# Patient Record
Sex: MALE | Race: WHITE | NOT HISPANIC OR LATINO
[De-identification: names, ages, dates, MRNs, and addresses within clinical notes are randomized per-mention and may not be internally consistent; named-entity substitution may affect disease eponyms.]

---

## 2017-02-10 ENCOUNTER — RESULT REVIEW (OUTPATIENT)
Age: 62
End: 2017-02-10

## 2017-02-17 ENCOUNTER — OUTPATIENT (OUTPATIENT)
Dept: OUTPATIENT SERVICES | Facility: HOSPITAL | Age: 62
LOS: 1 days | Discharge: HOME | End: 2017-02-17

## 2017-02-17 DIAGNOSIS — C85.80 OTHER SPECIFIED TYPES OF NON-HODGKIN LYMPHOMA, UNSPECIFIED SITE: ICD-10-CM

## 2017-02-21 ENCOUNTER — RESULT REVIEW (OUTPATIENT)
Age: 62
End: 2017-02-21

## 2017-02-21 ENCOUNTER — APPOINTMENT (OUTPATIENT)
Dept: HEMATOLOGY ONCOLOGY | Facility: CLINIC | Age: 62
End: 2017-02-21

## 2017-02-22 ENCOUNTER — APPOINTMENT (OUTPATIENT)
Dept: HEMATOLOGY ONCOLOGY | Facility: CLINIC | Age: 62
End: 2017-02-22

## 2017-02-22 VITALS
BODY MASS INDEX: 25.62 KG/M2 | SYSTOLIC BLOOD PRESSURE: 128 MMHG | DIASTOLIC BLOOD PRESSURE: 84 MMHG | HEART RATE: 60 BPM | RESPIRATION RATE: 16 BRPM | WEIGHT: 178.55 LBS | OXYGEN SATURATION: 97 % | TEMPERATURE: 98.2 F

## 2017-02-22 LAB
EOSINOPHIL # BLD AUTO: 0 K/UL — SIGNIFICANT CHANGE UP (ref 0–0.5)
EOSINOPHIL NFR BLD AUTO: 4 % — SIGNIFICANT CHANGE UP (ref 0–6)
HCT VFR BLD CALC: 35.8 % — LOW (ref 39–50)
HGB BLD-MCNC: 12.2 G/DL — LOW (ref 13–17)
LYMPHOCYTES # BLD AUTO: 0.8 K/UL — LOW (ref 1–3.3)
LYMPHOCYTES # BLD AUTO: 21 % — SIGNIFICANT CHANGE UP (ref 13–44)
MCHC RBC-ENTMCNC: 33.1 PG — SIGNIFICANT CHANGE UP (ref 27–34)
MCHC RBC-ENTMCNC: 34.1 G/DL — SIGNIFICANT CHANGE UP (ref 32–36)
MCV RBC AUTO: 97.2 FL — SIGNIFICANT CHANGE UP (ref 80–100)
MONOCYTES # BLD AUTO: 0.4 K/UL — SIGNIFICANT CHANGE UP (ref 0–0.9)
MONOCYTES NFR BLD AUTO: 15 % — HIGH (ref 2–14)
NEUTROPHILS # BLD AUTO: 1.4 K/UL — LOW (ref 1.8–7.4)
NEUTROPHILS NFR BLD AUTO: 60 % — SIGNIFICANT CHANGE UP (ref 43–77)
PLAT MORPH BLD: NORMAL — SIGNIFICANT CHANGE UP
PLATELET # BLD AUTO: 128 K/UL — LOW (ref 150–400)
RBC # BLD: 3.68 M/UL — LOW (ref 4.2–5.8)
RBC # FLD: 14.1 % — SIGNIFICANT CHANGE UP (ref 10.3–14.5)
RBC BLD AUTO: SIGNIFICANT CHANGE UP
RETICS #: 80.2 K/UL — SIGNIFICANT CHANGE UP (ref 25–125)
RETICS/RBC NFR: 2.1 % — SIGNIFICANT CHANGE UP (ref 0.5–2.5)
WBC # BLD: 2.7 K/UL — LOW (ref 3.8–10.5)
WBC # FLD AUTO: 2.7 K/UL — LOW (ref 3.8–10.5)

## 2017-02-26 ENCOUNTER — RESULT REVIEW (OUTPATIENT)
Age: 62
End: 2017-02-26

## 2017-02-27 ENCOUNTER — OUTPATIENT (OUTPATIENT)
Dept: OUTPATIENT SERVICES | Facility: HOSPITAL | Age: 62
LOS: 1 days | End: 2017-02-27
Payer: COMMERCIAL

## 2017-02-27 ENCOUNTER — LABORATORY RESULT (OUTPATIENT)
Age: 62
End: 2017-02-27

## 2017-02-27 ENCOUNTER — APPOINTMENT (OUTPATIENT)
Dept: HEMATOLOGY ONCOLOGY | Facility: CLINIC | Age: 62
End: 2017-02-27

## 2017-02-27 DIAGNOSIS — C85.80 OTHER SPECIFIED TYPES OF NON-HODGKIN LYMPHOMA, UNSPECIFIED SITE: ICD-10-CM

## 2017-02-27 LAB
BASOPHILS # BLD AUTO: 0 K/UL — SIGNIFICANT CHANGE UP (ref 0–0.2)
BASOPHILS NFR BLD AUTO: 0.7 % — SIGNIFICANT CHANGE UP (ref 0–2)
EOSINOPHIL # BLD AUTO: 0 K/UL — SIGNIFICANT CHANGE UP (ref 0–0.5)
EOSINOPHIL NFR BLD AUTO: 1.3 % — SIGNIFICANT CHANGE UP (ref 0–6)
HCT VFR BLD CALC: 36.4 % — LOW (ref 39–50)
HGB BLD-MCNC: 12.2 G/DL — LOW (ref 13–17)
LYMPHOCYTES # BLD AUTO: 0.8 K/UL — LOW (ref 1–3.3)
LYMPHOCYTES # BLD AUTO: 25.5 % — SIGNIFICANT CHANGE UP (ref 13–44)
MCHC RBC-ENTMCNC: 32.9 PG — SIGNIFICANT CHANGE UP (ref 27–34)
MCHC RBC-ENTMCNC: 33.5 G/DL — SIGNIFICANT CHANGE UP (ref 32–36)
MCV RBC AUTO: 98.5 FL — SIGNIFICANT CHANGE UP (ref 80–100)
MONOCYTES # BLD AUTO: 0.4 K/UL — SIGNIFICANT CHANGE UP (ref 0–0.9)
MONOCYTES NFR BLD AUTO: 12.9 % — SIGNIFICANT CHANGE UP (ref 2–14)
NEUTROPHILS # BLD AUTO: 1.8 K/UL — SIGNIFICANT CHANGE UP (ref 1.8–7.4)
NEUTROPHILS NFR BLD AUTO: 59.5 % — SIGNIFICANT CHANGE UP (ref 43–77)
PLATELET # BLD AUTO: 131 K/UL — LOW (ref 150–400)
RBC # BLD: 3.7 M/UL — LOW (ref 4.2–5.8)
RBC # FLD: 14.3 % — SIGNIFICANT CHANGE UP (ref 10.3–14.5)
RETICS #: 89.4 K/UL — SIGNIFICANT CHANGE UP (ref 25–125)
RETICS/RBC NFR: 2.4 % — SIGNIFICANT CHANGE UP (ref 0.5–2.5)
WBC # BLD: 3 K/UL — LOW (ref 3.8–10.5)
WBC # FLD AUTO: 3 K/UL — LOW (ref 3.8–10.5)

## 2017-02-27 PROCEDURE — 88237 TISSUE CULTURE BONE MARROW: CPT

## 2017-03-10 LAB — CHROM ANALY OVERALL INTERP SPEC-IMP: SIGNIFICANT CHANGE UP

## 2017-03-14 ENCOUNTER — APPOINTMENT (OUTPATIENT)
Dept: HEMATOLOGY ONCOLOGY | Facility: CLINIC | Age: 62
End: 2017-03-14

## 2017-03-14 ENCOUNTER — RESULT REVIEW (OUTPATIENT)
Age: 62
End: 2017-03-14

## 2017-03-15 ENCOUNTER — OUTPATIENT (OUTPATIENT)
Dept: OUTPATIENT SERVICES | Facility: HOSPITAL | Age: 62
LOS: 1 days | Discharge: HOME | End: 2017-03-15

## 2017-03-15 ENCOUNTER — APPOINTMENT (OUTPATIENT)
Dept: HEMATOLOGY ONCOLOGY | Facility: CLINIC | Age: 62
End: 2017-03-15

## 2017-03-15 DIAGNOSIS — C85.80 OTHER SPECIFIED TYPES OF NON-HODGKIN LYMPHOMA, UNSPECIFIED SITE: ICD-10-CM

## 2017-03-15 LAB
BASOPHILS # BLD AUTO: 0 K/UL — SIGNIFICANT CHANGE UP (ref 0–0.2)
BASOPHILS NFR BLD AUTO: 0.5 % — SIGNIFICANT CHANGE UP (ref 0–2)
EOSINOPHIL # BLD AUTO: 0.1 K/UL — SIGNIFICANT CHANGE UP (ref 0–0.5)
EOSINOPHIL NFR BLD AUTO: 1.4 % — SIGNIFICANT CHANGE UP (ref 0–6)
HCT VFR BLD CALC: 35.1 % — LOW (ref 39–50)
HGB BLD-MCNC: 12.1 G/DL — LOW (ref 13–17)
LYMPHOCYTES # BLD AUTO: 0.9 K/UL — LOW (ref 1–3.3)
LYMPHOCYTES # BLD AUTO: 21.8 % — SIGNIFICANT CHANGE UP (ref 13–44)
MCHC RBC-ENTMCNC: 34.1 PG — HIGH (ref 27–34)
MCHC RBC-ENTMCNC: 34.4 G/DL — SIGNIFICANT CHANGE UP (ref 32–36)
MCV RBC AUTO: 99.1 FL — SIGNIFICANT CHANGE UP (ref 80–100)
MONOCYTES # BLD AUTO: 0.5 K/UL — SIGNIFICANT CHANGE UP (ref 0–0.9)
MONOCYTES NFR BLD AUTO: 11.1 % — SIGNIFICANT CHANGE UP (ref 2–14)
NEUTROPHILS # BLD AUTO: 2.8 K/UL — SIGNIFICANT CHANGE UP (ref 1.8–7.4)
NEUTROPHILS NFR BLD AUTO: 65.3 % — SIGNIFICANT CHANGE UP (ref 43–77)
PLATELET # BLD AUTO: 180 K/UL — SIGNIFICANT CHANGE UP (ref 150–400)
RBC # BLD: 3.54 M/UL — LOW (ref 4.2–5.8)
RBC # FLD: 13.2 % — SIGNIFICANT CHANGE UP (ref 10.3–14.5)
WBC # BLD: 4.2 K/UL — SIGNIFICANT CHANGE UP (ref 3.8–10.5)
WBC # FLD AUTO: 4.2 K/UL — SIGNIFICANT CHANGE UP (ref 3.8–10.5)

## 2017-03-20 ENCOUNTER — RESULT REVIEW (OUTPATIENT)
Age: 62
End: 2017-03-20

## 2017-03-20 LAB
ALBUMIN SERPL ELPH-MCNC: 4.2 G/DL
ALP BLD-CCNC: 37 U/L
ALT SERPL-CCNC: 16 U/L
ANION GAP SERPL CALC-SCNC: 11 MMOL/L
AST SERPL-CCNC: 17 U/L
BILIRUB SERPL-MCNC: 0.4 MG/DL
BUN SERPL-MCNC: 17 MG/DL
CALCIUM SERPL-MCNC: 9.2 MG/DL
CHLORIDE SERPL-SCNC: 103 MMOL/L
CO2 SERPL-SCNC: 27 MMOL/L
CREAT SERPL-MCNC: 1.2 MG/DL
GLUCOSE SERPL-MCNC: 99 MG/DL
LDH SERPL-CCNC: 218 U/L
POTASSIUM SERPL-SCNC: 4.6 MMOL/L
PROT SERPL-MCNC: 6.3 G/DL
SODIUM SERPL-SCNC: 141 MMOL/L

## 2017-04-28 ENCOUNTER — RESULT REVIEW (OUTPATIENT)
Age: 62
End: 2017-04-28

## 2017-04-28 LAB
ALBUMIN SERPL ELPH-MCNC: 4.4 G/DL
ALP BLD-CCNC: 37 U/L
ALT SERPL-CCNC: 23 U/L
ANION GAP SERPL CALC-SCNC: 12 MMOL/L
AST SERPL-CCNC: 21 U/L
BILIRUB SERPL-MCNC: 0.6 MG/DL
BUN SERPL-MCNC: 15 MG/DL
CALCIUM SERPL-MCNC: 9.5 MG/DL
CHLORIDE SERPL-SCNC: 102 MMOL/L
CO2 SERPL-SCNC: 26 MMOL/L
CREAT SERPL-MCNC: 1.25 MG/DL
GLUCOSE SERPL-MCNC: 101 MG/DL
LDH SERPL-CCNC: 217 U/L
POTASSIUM SERPL-SCNC: 5.4 MMOL/L
PROT SERPL-MCNC: 6.6 G/DL
SODIUM SERPL-SCNC: 140 MMOL/L

## 2017-05-05 ENCOUNTER — OUTPATIENT (OUTPATIENT)
Dept: OUTPATIENT SERVICES | Facility: HOSPITAL | Age: 62
LOS: 1 days | Discharge: HOME | End: 2017-05-05

## 2017-05-05 DIAGNOSIS — C85.80 OTHER SPECIFIED TYPES OF NON-HODGKIN LYMPHOMA, UNSPECIFIED SITE: ICD-10-CM

## 2017-05-09 ENCOUNTER — RESULT REVIEW (OUTPATIENT)
Age: 62
End: 2017-05-09

## 2017-05-09 ENCOUNTER — APPOINTMENT (OUTPATIENT)
Dept: HEMATOLOGY ONCOLOGY | Facility: CLINIC | Age: 62
End: 2017-05-09

## 2017-05-09 VITALS
RESPIRATION RATE: 16 BRPM | HEART RATE: 67 BPM | SYSTOLIC BLOOD PRESSURE: 138 MMHG | BODY MASS INDEX: 26.41 KG/M2 | OXYGEN SATURATION: 99 % | DIASTOLIC BLOOD PRESSURE: 89 MMHG | TEMPERATURE: 97.6 F | WEIGHT: 184.09 LBS

## 2017-05-09 LAB
BASOPHILS # BLD AUTO: 0 K/UL — SIGNIFICANT CHANGE UP (ref 0–0.2)
BASOPHILS NFR BLD AUTO: 1 % — SIGNIFICANT CHANGE UP (ref 0–2)
EOSINOPHIL # BLD AUTO: 0.1 K/UL — SIGNIFICANT CHANGE UP (ref 0–0.5)
EOSINOPHIL NFR BLD AUTO: 2.4 % — SIGNIFICANT CHANGE UP (ref 0–6)
HCT VFR BLD CALC: 38.7 % — LOW (ref 39–50)
HGB BLD-MCNC: 13.4 G/DL — SIGNIFICANT CHANGE UP (ref 13–17)
LYMPHOCYTES # BLD AUTO: 1 K/UL — SIGNIFICANT CHANGE UP (ref 1–3.3)
LYMPHOCYTES # BLD AUTO: 26.8 % — SIGNIFICANT CHANGE UP (ref 13–44)
MCHC RBC-ENTMCNC: 34.5 PG — HIGH (ref 27–34)
MCHC RBC-ENTMCNC: 34.6 G/DL — SIGNIFICANT CHANGE UP (ref 32–36)
MCV RBC AUTO: 99.6 FL — SIGNIFICANT CHANGE UP (ref 80–100)
MONOCYTES # BLD AUTO: 0.5 K/UL — SIGNIFICANT CHANGE UP (ref 0–0.9)
MONOCYTES NFR BLD AUTO: 13 % — SIGNIFICANT CHANGE UP (ref 2–14)
NEUTROPHILS # BLD AUTO: 2.2 K/UL — SIGNIFICANT CHANGE UP (ref 1.8–7.4)
NEUTROPHILS NFR BLD AUTO: 56.8 % — SIGNIFICANT CHANGE UP (ref 43–77)
PLATELET # BLD AUTO: 129 K/UL — LOW (ref 150–400)
RBC # BLD: 3.89 M/UL — LOW (ref 4.2–5.8)
RBC # FLD: 12 % — SIGNIFICANT CHANGE UP (ref 10.3–14.5)
WBC # BLD: 3.8 K/UL — SIGNIFICANT CHANGE UP (ref 3.8–10.5)
WBC # FLD AUTO: 3.8 K/UL — SIGNIFICANT CHANGE UP (ref 3.8–10.5)

## 2017-05-11 ENCOUNTER — TRANSCRIPTION ENCOUNTER (OUTPATIENT)
Age: 62
End: 2017-05-11

## 2017-06-28 ENCOUNTER — APPOINTMENT (OUTPATIENT)
Dept: OTOLARYNGOLOGY | Facility: CLINIC | Age: 62
End: 2017-06-28

## 2017-06-28 VITALS
DIASTOLIC BLOOD PRESSURE: 78 MMHG | WEIGHT: 178 LBS | HEART RATE: 72 BPM | SYSTOLIC BLOOD PRESSURE: 120 MMHG | HEIGHT: 70 IN | BODY MASS INDEX: 25.48 KG/M2

## 2017-06-28 RX ORDER — MULTIVIT,IRON,MINERALS/LUTEIN
TABLET ORAL
Refills: 0 | Status: DISCONTINUED | COMMUNITY
End: 2017-06-28

## 2017-07-05 ENCOUNTER — RESULT REVIEW (OUTPATIENT)
Age: 62
End: 2017-07-05

## 2017-07-05 LAB
ALBUMIN SERPL ELPH-MCNC: 4.3 G/DL
ALP BLD-CCNC: 35 U/L
ALT SERPL-CCNC: 19 U/L
ANION GAP SERPL CALC-SCNC: 11 MMOL/L
AST SERPL-CCNC: 21 U/L
BILIRUB SERPL-MCNC: 0.4 MG/DL
BUN SERPL-MCNC: 15 MG/DL
CALCIUM SERPL-MCNC: 9.5 MG/DL
CHLORIDE SERPL-SCNC: 103 MMOL/L
CO2 SERPL-SCNC: 28 MMOL/L
CREAT SERPL-MCNC: 1.04 MG/DL
GLUCOSE SERPL-MCNC: 87 MG/DL
LDH SERPL-CCNC: 210 U/L
POTASSIUM SERPL-SCNC: 5.1 MMOL/L
PROT SERPL-MCNC: 6.5 G/DL
SODIUM SERPL-SCNC: 142 MMOL/L

## 2017-08-16 ENCOUNTER — NON-APPOINTMENT (OUTPATIENT)
Age: 62
End: 2017-08-16

## 2017-08-16 ENCOUNTER — APPOINTMENT (OUTPATIENT)
Dept: INTERNAL MEDICINE | Facility: CLINIC | Age: 62
End: 2017-08-16
Payer: COMMERCIAL

## 2017-08-16 VITALS — SYSTOLIC BLOOD PRESSURE: 134 MMHG | RESPIRATION RATE: 12 BRPM | DIASTOLIC BLOOD PRESSURE: 90 MMHG | HEART RATE: 75 BPM

## 2017-08-16 VITALS — WEIGHT: 175 LBS | HEIGHT: 70 IN | BODY MASS INDEX: 25.05 KG/M2

## 2017-08-16 VITALS — DIASTOLIC BLOOD PRESSURE: 88 MMHG | SYSTOLIC BLOOD PRESSURE: 128 MMHG

## 2017-08-16 DIAGNOSIS — H93.12 TINNITUS, LEFT EAR: ICD-10-CM

## 2017-08-16 PROCEDURE — 93000 ELECTROCARDIOGRAM COMPLETE: CPT

## 2017-08-16 PROCEDURE — 99215 OFFICE O/P EST HI 40 MIN: CPT | Mod: 25

## 2017-08-16 RX ORDER — LEVOFLOXACIN 500 MG/1
500 TABLET, FILM COATED ORAL
Qty: 5 | Refills: 0 | Status: COMPLETED | COMMUNITY
Start: 2017-05-12

## 2017-08-16 RX ORDER — CEFUROXIME AXETIL 500 MG/1
500 TABLET ORAL
Qty: 10 | Refills: 0 | Status: COMPLETED | COMMUNITY
Start: 2017-05-12

## 2018-02-22 ENCOUNTER — APPOINTMENT (OUTPATIENT)
Dept: INTERNAL MEDICINE | Facility: CLINIC | Age: 63
End: 2018-02-22
Payer: COMMERCIAL

## 2018-02-22 VITALS — HEART RATE: 80 BPM | RESPIRATION RATE: 12 BRPM | SYSTOLIC BLOOD PRESSURE: 154 MMHG | DIASTOLIC BLOOD PRESSURE: 98 MMHG

## 2018-02-22 VITALS — DIASTOLIC BLOOD PRESSURE: 100 MMHG | SYSTOLIC BLOOD PRESSURE: 144 MMHG

## 2018-02-22 VITALS — BODY MASS INDEX: 26.77 KG/M2 | HEIGHT: 70 IN | WEIGHT: 187 LBS

## 2018-02-22 DIAGNOSIS — D61.818 OTHER PANCYTOPENIA: ICD-10-CM

## 2018-02-22 PROCEDURE — 99215 OFFICE O/P EST HI 40 MIN: CPT

## 2018-04-03 ENCOUNTER — RX RENEWAL (OUTPATIENT)
Age: 63
End: 2018-04-03

## 2018-04-03 ENCOUNTER — TRANSCRIPTION ENCOUNTER (OUTPATIENT)
Age: 63
End: 2018-04-03

## 2018-06-18 ENCOUNTER — RX RENEWAL (OUTPATIENT)
Age: 63
End: 2018-06-18

## 2018-06-20 ENCOUNTER — RX RENEWAL (OUTPATIENT)
Age: 63
End: 2018-06-20

## 2018-06-21 ENCOUNTER — OUTPATIENT (OUTPATIENT)
Dept: OUTPATIENT SERVICES | Facility: HOSPITAL | Age: 63
LOS: 1 days | Discharge: HOME | End: 2018-06-21

## 2018-06-21 DIAGNOSIS — C85.80 OTHER SPECIFIED TYPES OF NON-HODGKIN LYMPHOMA, UNSPECIFIED SITE: ICD-10-CM

## 2018-06-25 ENCOUNTER — APPOINTMENT (OUTPATIENT)
Dept: HEMATOLOGY ONCOLOGY | Facility: CLINIC | Age: 63
End: 2018-06-25
Payer: COMMERCIAL

## 2018-06-25 ENCOUNTER — RESULT REVIEW (OUTPATIENT)
Age: 63
End: 2018-06-25

## 2018-06-25 VITALS
OXYGEN SATURATION: 98 % | DIASTOLIC BLOOD PRESSURE: 90 MMHG | BODY MASS INDEX: 27.52 KG/M2 | SYSTOLIC BLOOD PRESSURE: 140 MMHG | HEART RATE: 82 BPM | RESPIRATION RATE: 16 BRPM | WEIGHT: 191.8 LBS | TEMPERATURE: 98.1 F

## 2018-06-25 LAB
BASOPHILS # BLD AUTO: 0 K/UL — SIGNIFICANT CHANGE UP (ref 0–0.2)
BASOPHILS NFR BLD AUTO: 0.7 % — SIGNIFICANT CHANGE UP (ref 0–2)
EOSINOPHIL # BLD AUTO: 0.2 K/UL — SIGNIFICANT CHANGE UP (ref 0–0.5)
EOSINOPHIL NFR BLD AUTO: 3.6 % — SIGNIFICANT CHANGE UP (ref 0–6)
HCT VFR BLD CALC: 42.3 % — SIGNIFICANT CHANGE UP (ref 39–50)
HGB BLD-MCNC: 14.6 G/DL — SIGNIFICANT CHANGE UP (ref 13–17)
LYMPHOCYTES # BLD AUTO: 1.4 K/UL — SIGNIFICANT CHANGE UP (ref 1–3.3)
LYMPHOCYTES # BLD AUTO: 23.2 % — SIGNIFICANT CHANGE UP (ref 13–44)
MCHC RBC-ENTMCNC: 32.1 PG — SIGNIFICANT CHANGE UP (ref 27–34)
MCHC RBC-ENTMCNC: 34.5 G/DL — SIGNIFICANT CHANGE UP (ref 32–36)
MCV RBC AUTO: 92.8 FL — SIGNIFICANT CHANGE UP (ref 80–100)
MONOCYTES # BLD AUTO: 0.6 K/UL — SIGNIFICANT CHANGE UP (ref 0–0.9)
MONOCYTES NFR BLD AUTO: 9.9 % — SIGNIFICANT CHANGE UP (ref 2–14)
NEUTROPHILS # BLD AUTO: 3.7 K/UL — SIGNIFICANT CHANGE UP (ref 1.8–7.4)
NEUTROPHILS NFR BLD AUTO: 62.5 % — SIGNIFICANT CHANGE UP (ref 43–77)
PLATELET # BLD AUTO: 166 K/UL — SIGNIFICANT CHANGE UP (ref 150–400)
RBC # BLD: 4.56 M/UL — SIGNIFICANT CHANGE UP (ref 4.2–5.8)
RBC # FLD: 12.9 % — SIGNIFICANT CHANGE UP (ref 10.3–14.5)
WBC # BLD: 5.9 K/UL — SIGNIFICANT CHANGE UP (ref 3.8–10.5)
WBC # FLD AUTO: 5.9 K/UL — SIGNIFICANT CHANGE UP (ref 3.8–10.5)

## 2018-06-25 PROCEDURE — 99214 OFFICE O/P EST MOD 30 MIN: CPT

## 2018-06-26 LAB
ALBUMIN SERPL ELPH-MCNC: 4 G/DL
ALP BLD-CCNC: 44 U/L
ALT SERPL-CCNC: 17 U/L
ANION GAP SERPL CALC-SCNC: 15 MMOL/L
AST SERPL-CCNC: 16 U/L
BILIRUB SERPL-MCNC: 0.4 MG/DL
BUN SERPL-MCNC: 21 MG/DL
CALCIUM SERPL-MCNC: 9.1 MG/DL
CHLORIDE SERPL-SCNC: 101 MMOL/L
CHOLEST SERPL-MCNC: 221 MG/DL
CHOLEST/HDLC SERPL: 4.4 RATIO
CO2 SERPL-SCNC: 26 MMOL/L
CREAT SERPL-MCNC: 1.13 MG/DL
DEPRECATED KAPPA LC FREE/LAMBDA SER: 0.62 RATIO
DEPRECATED KAPPA LC FREE/LAMBDA SER: 0.62 RATIO
GLUCOSE SERPL-MCNC: 94 MG/DL
HDLC SERPL-MCNC: 50 MG/DL
IGA SER QL IEP: 128 MG/DL
IGG SER QL IEP: 894 MG/DL
IGM SER QL IEP: 122 MG/DL
KAPPA LC CSF-MCNC: 2.58 MG/DL
KAPPA LC CSF-MCNC: 2.58 MG/DL
KAPPA LC SERPL-MCNC: 1.6 MG/DL
KAPPA LC SERPL-MCNC: 1.6 MG/DL
LDH SERPL-CCNC: 210 U/L
LDLC SERPL CALC-MCNC: 140 MG/DL
POTASSIUM SERPL-SCNC: 3.9 MMOL/L
PROT SERPL-MCNC: 6.6 G/DL
SODIUM SERPL-SCNC: 142 MMOL/L
TRIGL SERPL-MCNC: 153 MG/DL

## 2018-06-27 LAB — M PROTEIN SPEC IFE-MCNC: NORMAL

## 2018-07-01 ENCOUNTER — FORM ENCOUNTER (OUTPATIENT)
Age: 63
End: 2018-07-01

## 2018-07-02 ENCOUNTER — APPOINTMENT (OUTPATIENT)
Dept: CT IMAGING | Facility: IMAGING CENTER | Age: 63
End: 2018-07-02
Payer: COMMERCIAL

## 2018-07-02 ENCOUNTER — OUTPATIENT (OUTPATIENT)
Dept: OUTPATIENT SERVICES | Facility: HOSPITAL | Age: 63
LOS: 1 days | End: 2018-07-02
Payer: COMMERCIAL

## 2018-07-02 DIAGNOSIS — C82.80 OTHER TYPES OF FOLLICULAR LYMPHOMA, UNSPECIFIED SITE: ICD-10-CM

## 2018-07-02 PROCEDURE — 74177 CT ABD & PELVIS W/CONTRAST: CPT

## 2018-07-02 PROCEDURE — 71260 CT THORAX DX C+: CPT

## 2018-07-02 PROCEDURE — 71260 CT THORAX DX C+: CPT | Mod: 26

## 2018-07-02 PROCEDURE — 74177 CT ABD & PELVIS W/CONTRAST: CPT | Mod: 26

## 2018-11-09 ENCOUNTER — RESULT REVIEW (OUTPATIENT)
Age: 63
End: 2018-11-09

## 2019-03-15 ENCOUNTER — RX RENEWAL (OUTPATIENT)
Age: 64
End: 2019-03-15

## 2019-03-29 ENCOUNTER — APPOINTMENT (OUTPATIENT)
Dept: INTERNAL MEDICINE | Facility: CLINIC | Age: 64
End: 2019-03-29
Payer: MEDICARE

## 2019-03-29 VITALS — DIASTOLIC BLOOD PRESSURE: 80 MMHG | SYSTOLIC BLOOD PRESSURE: 132 MMHG

## 2019-03-29 VITALS — HEART RATE: 80 BPM | RESPIRATION RATE: 12 BRPM | DIASTOLIC BLOOD PRESSURE: 84 MMHG | SYSTOLIC BLOOD PRESSURE: 124 MMHG

## 2019-03-29 VITALS
BODY MASS INDEX: 26.63 KG/M2 | DIASTOLIC BLOOD PRESSURE: 96 MMHG | SYSTOLIC BLOOD PRESSURE: 128 MMHG | WEIGHT: 186 LBS | HEART RATE: 78 BPM | HEIGHT: 70 IN

## 2019-03-29 DIAGNOSIS — Z87.891 PERSONAL HISTORY OF NICOTINE DEPENDENCE: ICD-10-CM

## 2019-03-29 DIAGNOSIS — N40.0 BENIGN PROSTATIC HYPERPLASIA WITHOUT LOWER URINARY TRACT SYMPMS: ICD-10-CM

## 2019-03-29 PROCEDURE — 99214 OFFICE O/P EST MOD 30 MIN: CPT

## 2019-03-29 NOTE — ASSESSMENT
[FreeTextEntry1] : SVT hx.  COnt Metoprolol\par HCVD. Good control Cont Verapamil  80.  bid\par UC x 25 years.  Colonoscopy 11-18  \par Hemorrhoidal bleeding mild and well tolerated.  \par Gets "chelation therapy" for "heavy metals"  \par Follicular lymphoma 2010, s.p chemo and RT.  Dr. Andres Newsome\par ECHO 5-17 shows trace MR PI TI\par Carotids 5-17 ok\par Popliteal artery 5-17 mild plaque\par Vent hernia\par Tinnitus.  Recurred after paining house and leaning. Had previous MRI that did not disclose int auditory canal pathology.  ENT rec noise machine.\par BPH and hx of prostate Bx.  PSA Dr Ortega \par L knee pain and thumb pain.  To see Dr Dove\par Declines Prevnar today\par Declines labs today as they were done yesterday by the [place] doctor\par

## 2019-03-29 NOTE — HEALTH RISK ASSESSMENT
[No falls in past year] : Patient reported no falls in the past year [0] : 2) Feeling down, depressed, or hopeless: Not at all (0) [] : No [[id number]] : 0

## 2019-03-29 NOTE — REVIEW OF SYSTEMS
[Negative] : Heme/Lymph [FreeTextEntry2] : See HPI [FreeTextEntry8] : Prostate Bx neg and PSA better

## 2019-03-29 NOTE — PHYSICAL EXAM
[No Acute Distress] : no acute distress [Well Nourished] : well nourished [Well Developed] : well developed [Well-Appearing] : well-appearing [Normal Sclera/Conjunctiva] : normal sclera/conjunctiva [PERRL] : pupils equal round and reactive to light [EOMI] : extraocular movements intact [Normal Outer Ear/Nose] : the outer ears and nose were normal in appearance [Normal Oropharynx] : the oropharynx was normal [Normal TMs] : both tympanic membranes were normal [No JVD] : no jugular venous distention [No Lymphadenopathy] : no lymphadenopathy [Thyroid Normal, No Nodules] : the thyroid was normal and there were no nodules present [No Respiratory Distress] : no respiratory distress  [Clear to Auscultation] : lungs were clear to auscultation bilaterally [No Accessory Muscle Use] : no accessory muscle use [Normal Rate] : normal rate  [Regular Rhythm] : with a regular rhythm [Normal S1, S2] : normal S1 and S2 [No Murmur] : no murmur heard [No Carotid Bruits] : no carotid bruits [No Abdominal Bruit] : a ~M bruit was not heard ~T in the abdomen [No Varicosities] : no varicosities [Pedal Pulses Present] : the pedal pulses are present [No Extremity Clubbing/Cyanosis] : no extremity clubbing/cyanosis [No Palpable Aorta] : no palpable aorta [Normal Appearance] : normal in appearance [No Nipple Discharge] : no nipple discharge [No Axillary Lymphadenopathy] : no axillary lymphadenopathy [Soft] : abdomen soft [Non Tender] : non-tender [Non-distended] : non-distended [No Masses] : no abdominal mass palpated [No HSM] : no HSM [Normal Bowel Sounds] : normal bowel sounds [Normal Supraclavicular Nodes] : no supraclavicular lymphadenopathy [Normal Axillary Nodes] : no axillary lymphadenopathy [Normal Anterior Cervical Nodes] : no anterior cervical lymphadenopathy [No CVA Tenderness] : no CVA  tenderness [No Spinal Tenderness] : no spinal tenderness [No Joint Swelling] : no joint swelling [Grossly Normal Strength/Tone] : grossly normal strength/tone [Normal Gait] : normal gait [Coordination Grossly Intact] : coordination grossly intact [No Focal Deficits] : no focal deficits [Normal Affect] : the affect was normal [Alert and Oriented x3] : oriented to person, place, and time [de-identified] : Trace edema at socks

## 2019-03-29 NOTE — HISTORY OF PRESENT ILLNESS
[FreeTextEntry1] : C/o tinnitus since he strained neck painting .  Getting better.  Saw ENT.\par Had heme f/up for lymphoma\par Eval of HCVD\par c/o L knee pain during trip to [place] related to walking.  Has appt to see Dr Dove. \par C/o AM back pain that resolves\par c/o L thumb pain at times.  \par Had IV chelation yesterday and had labs\par Planning to see Urology and get PSA [de-identified] : Saw Heme Onc and following borderline low plates

## 2019-04-04 ENCOUNTER — APPOINTMENT (OUTPATIENT)
Dept: ORTHOPEDIC SURGERY | Facility: CLINIC | Age: 64
End: 2019-04-04
Payer: MEDICARE

## 2019-04-04 VITALS
BODY MASS INDEX: 26.48 KG/M2 | WEIGHT: 185 LBS | HEART RATE: 55 BPM | SYSTOLIC BLOOD PRESSURE: 166 MMHG | HEIGHT: 70 IN | DIASTOLIC BLOOD PRESSURE: 107 MMHG

## 2019-04-04 VITALS — HEIGHT: 70 IN | WEIGHT: 185 LBS | BODY MASS INDEX: 26.48 KG/M2

## 2019-04-04 DIAGNOSIS — Z85.72 PERSONAL HISTORY OF NON-HODGKIN LYMPHOMAS: ICD-10-CM

## 2019-04-04 DIAGNOSIS — Z78.9 OTHER SPECIFIED HEALTH STATUS: ICD-10-CM

## 2019-04-04 DIAGNOSIS — F19.90 OTHER PSYCHOACTIVE SUBSTANCE USE, UNSPECIFIED, UNCOMPLICATED: ICD-10-CM

## 2019-04-04 DIAGNOSIS — M18.12 UNILATERAL PRIMARY OSTEOARTHRITIS OF FIRST CARPOMETACARPAL JOINT, LEFT HAND: ICD-10-CM

## 2019-04-04 DIAGNOSIS — M17.12 UNILATERAL PRIMARY OSTEOARTHRITIS, LEFT KNEE: ICD-10-CM

## 2019-04-04 DIAGNOSIS — Z80.1 FAMILY HISTORY OF MALIGNANT NEOPLASM OF TRACHEA, BRONCHUS AND LUNG: ICD-10-CM

## 2019-04-04 PROCEDURE — 99204 OFFICE O/P NEW MOD 45 MIN: CPT

## 2019-04-04 PROCEDURE — 73130 X-RAY EXAM OF HAND: CPT | Mod: LT

## 2019-04-04 PROCEDURE — 73562 X-RAY EXAM OF KNEE 3: CPT | Mod: 50

## 2019-04-04 NOTE — PHYSICAL EXAM
[de-identified] : Constitutional: Well-nourished, well-developed, No acute distress\par Respiratory:  Good respiratory effort, no SOB\par Lymphatic: No regional lymphadenopathy, no lymphedema\par Psychiatric: Pleasant and normal affect, alert and oriented x3\par Skin: Clean dry and intact B/L UE/LE\par Musculoskeletal: normal except where as noted in regional exam\par \par B/L Hips: No asymmetry, malalignment, or swelling, Full ROM, 5/5 strength in flexion/ext, IR/ER, Abd/Add, Joints stable\par B/L Ankles: No asymmetry, malalignment, or swelling, Full ROM, 5/5 strength in DF/PF/Inv/Ev, Joints stable\par \par BIOMECHANICAL EXAM: no marked leg length discrepancy, moderate hip abductor weakness b/l, no marked pes planus or foot pronation, tight hams and ITB b/l.  Normal gait and station\par \par Left Knee:\par APPEARANCE: no marked deformities, no swelling or malalignment\par POSITIVE TENDERNESS:  + crepitus of the anterior knee, and tenderness of patellar retinaculum\par NONTENDER: jt lines b/l, patellar & quadriceps tendons, MCL/LCL, ITB at the lateral femoral condyle & Gerdy's tubercle, pes bursa. \par ROM: full & painless, although some discomfort in deep knee flexion\par RESISTIVE TESTING: + discomfort with knee ext from deep knee flexion (stretched position), painless knee flexion. \par SPECIAL TESTS: stable v/v stress. painless grind. neg Lachman's. neg ant/post drawer. neg Heather's. neg Thessaly test. neg Tomasz's & Malacrae's\par NEURO: Normal sensation of LE, DTRs 2+/4 patella and achilles\par PULSES: 2+ DP/PT pulses\par \par Right Knee:\par APPEARANCE: no marked deformities, no swelling or malalignment\par POSITIVE TENDERNESS:  + crepitus of the anterior knee, and tenderness of patellar retinaculum\par NONTENDER: jt lines b/l, patellar & quadriceps tendons, MCL/LCL, ITB at the lateral femoral condyle & Gerdy's tubercle, pes bursa. \par ROM: full & painless, although some discomfort in deep knee flexion\par RESISTIVE TESTING: + discomfort with knee ext from deep knee flexion (stretched position), painless knee flexion. \par SPECIAL TESTS: stable v/v stress. painless grind. neg Lachman's. neg ant/post drawer. neg Heather's. neg Thessaly test. neg Tomasz's & Malacrae's\par NEURO: Normal sensation of LE, DTRs 2+/4 patella and achilles\par PULSES: 2+ DP/PT pulses\par \par Right Hand:\par APPEARANCE: no marked deformities, no swelling or malalignment\par POSITIVE TENDERNESS: none\par NONTENDER: osseous and ligamentous structures. \par ROM: full & painless in CMC, MCP, and IP joints. \par RESISTIVE TESTING: painless resisted testing. \par SPECIAL TESTS: normal sensation of 1st through 5th digits, normal flex/ext, abd/add, and opposition of thumb, no triggering of fingers\par Vasc: 2+ radial pulse, normal capillary refill\par Neuro: AIN, PIN, Ulnar nerve intact to motor\par Sensation: Intact to light touch throughout\par B/L Shoulders:  No asymmetry, malalignment, or swelling, Full ROM, 5/5 strength in flexion/ext, Abd/Add, IR/ER, Joints stable\par B/L Elbows:  No asymmetry, malalignment, or swelling, Full ROM, 5/5 strength in flex/ext, pronation/supination, Joints stable\par B/L wrists: No asymmetry, malalignment, or swelling, Full ROM, 5/5 strength in wrist flexion/ext, and radial/ulnar deviation, Joints stable\par \par Left Hand:\par APPEARANCE: No swelling, no marked deformities or malalignment of the fingers\par POSITIVE TENDERNESS: + 1st CMC joint\par NONTENDER: all other osseous and ligamentous structures. \par ROM: + Crepitus and mild limitation of the first CMC joint, otherwise full & painless in CMC, MCP, and IP joints. \par RESISTIVE TESTING: painless resisted testing. \par SPECIAL TESTS: normal sensation of 1st through 5th digits, normal flex/ext, abd/add, and opposition of thumb, no triggering of fingers\par Vasc: 2+ radial pulse, normal capillary refill\par Neuro: AIN, PIN, Ulnar nerve intact to motor\par Sensation: Intact to light touch throughout\par  [de-identified] : The following radiographs were ordered and read by me during this patient's visit. I reviewed each radiograph in detail with the patient and discussed the findings as highlighted below. \par \par 3 views of the bilateral knees were obtained today that show degenerative changes and evidence of mild tricompartmental osteoarthritis.  There is a large calcific deposit seen in the proximal attachment of the MCL on the left knee. No fracture, or dislocation seen at this time. There is no malalignment. No other obvious osseous abnormality. Otherwise unremarkable.\par \par 3 views of the left hand were obtained today that show degenerative changes and evidence of moderate to severe osteoarthritis in the first CMC joint with partial subluxation of the base of the metacarpal.  No fracture, or dislocation seen at this time. There is no malalignment. No other obvious osseous abnormality. Otherwise unremarkable.

## 2019-04-04 NOTE — DISCUSSION/SUMMARY
[de-identified] : Discussed findings of today's exam and possible causes of patient's pain.  Educated patient on their most probable diagnosis of mild bilateral knee osteoarthritis, left symptomatic at this time; and chronic left thumb pain secondary to moderate to severe osteoarthritis of the first CMC joint with mild subluxation.  Reviewed possible courses of treatment, and we collaboratively decided best course of treatment at this time will include conservative management.  Patient started on a course of oral NSAIDs, prescription given for Mobic. Patient advised that oral instead would be beneficial for both knees as well his left thumb. We discussed the role of cortisone injection into left thumb is also to bilateral knees, patient like to defer at this time. We also discussed utilization of hyaluronic acid in the bilateral knees, patient wishes to defer at this time. Patient advised he can  over the counter thumb spica splint to be worn during the day for support of his left thumb pain.  Follow up as needed.  Patient appreciates and agrees with current plan.\par \par This note was generated using dragon medical dictation software.  A reasonable effort has been made for proofreading its contents, but typos may still remain.  If there are any questions or points of clarification needed please notify my office.

## 2019-04-04 NOTE — HISTORY OF PRESENT ILLNESS
[de-identified] : Patient is here for left knee pain that began about a month ago when he was on a trip where he was doing excessive amounts of walking. He continued to walk through the pain and once he returned and got back to a normal routine he states that the pain has begun to decrease. He has used a heating pad for this pain. Denies N/T/R/Prior injury. \par He is also complaining of left thumb pain around the CMC joint. He states that it is intermittent. He has done nothing to treat this pain. He states that he may have dislocated this thumb in the past but cannot recall for sure which side it was. Denies N/T/R.

## 2019-04-27 ENCOUNTER — APPOINTMENT (OUTPATIENT)
Dept: MRI IMAGING | Facility: IMAGING CENTER | Age: 64
End: 2019-04-27

## 2019-05-29 ENCOUNTER — OUTPATIENT (OUTPATIENT)
Dept: OUTPATIENT SERVICES | Facility: HOSPITAL | Age: 64
LOS: 1 days | Discharge: HOME | End: 2019-05-29

## 2019-05-29 DIAGNOSIS — C85.80 OTHER SPECIFIED TYPES OF NON-HODGKIN LYMPHOMA, UNSPECIFIED SITE: ICD-10-CM

## 2019-06-03 ENCOUNTER — RESULT REVIEW (OUTPATIENT)
Age: 64
End: 2019-06-03

## 2019-06-03 ENCOUNTER — APPOINTMENT (OUTPATIENT)
Dept: HEMATOLOGY ONCOLOGY | Facility: CLINIC | Age: 64
End: 2019-06-03
Payer: MEDICARE

## 2019-06-03 VITALS
OXYGEN SATURATION: 99 % | SYSTOLIC BLOOD PRESSURE: 139 MMHG | RESPIRATION RATE: 16 BRPM | TEMPERATURE: 98.3 F | DIASTOLIC BLOOD PRESSURE: 93 MMHG | BODY MASS INDEX: 27.68 KG/M2 | HEART RATE: 58 BPM | WEIGHT: 192.9 LBS

## 2019-06-03 DIAGNOSIS — C82.80 OTHER TYPES OF FOLLICULAR LYMPHOMA, UNSPECIFIED SITE: ICD-10-CM

## 2019-06-03 LAB
BASOPHILS # BLD AUTO: 0 K/UL — SIGNIFICANT CHANGE UP (ref 0–0.2)
BASOPHILS NFR BLD AUTO: 0.6 % — SIGNIFICANT CHANGE UP (ref 0–2)
EOSINOPHIL # BLD AUTO: 0.1 K/UL — SIGNIFICANT CHANGE UP (ref 0–0.5)
EOSINOPHIL NFR BLD AUTO: 2.3 % — SIGNIFICANT CHANGE UP (ref 0–6)
HCT VFR BLD CALC: 40.6 % — SIGNIFICANT CHANGE UP (ref 39–50)
HGB BLD-MCNC: 14.2 G/DL — SIGNIFICANT CHANGE UP (ref 13–17)
LYMPHOCYTES # BLD AUTO: 1.2 K/UL — SIGNIFICANT CHANGE UP (ref 1–3.3)
LYMPHOCYTES # BLD AUTO: 22.7 % — SIGNIFICANT CHANGE UP (ref 13–44)
MCHC RBC-ENTMCNC: 32.2 PG — SIGNIFICANT CHANGE UP (ref 27–34)
MCHC RBC-ENTMCNC: 35 G/DL — SIGNIFICANT CHANGE UP (ref 32–36)
MCV RBC AUTO: 92 FL — SIGNIFICANT CHANGE UP (ref 80–100)
MONOCYTES # BLD AUTO: 0.6 K/UL — SIGNIFICANT CHANGE UP (ref 0–0.9)
MONOCYTES NFR BLD AUTO: 10.5 % — SIGNIFICANT CHANGE UP (ref 2–14)
NEUTROPHILS # BLD AUTO: 3.4 K/UL — SIGNIFICANT CHANGE UP (ref 1.8–7.4)
NEUTROPHILS NFR BLD AUTO: 63.9 % — SIGNIFICANT CHANGE UP (ref 43–77)
PLATELET # BLD AUTO: 175 K/UL — SIGNIFICANT CHANGE UP (ref 150–400)
RBC # BLD: 4.41 M/UL — SIGNIFICANT CHANGE UP (ref 4.2–5.8)
RBC # FLD: 11.7 % — SIGNIFICANT CHANGE UP (ref 10.3–14.5)
WBC # BLD: 5.4 K/UL — SIGNIFICANT CHANGE UP (ref 3.8–10.5)
WBC # FLD AUTO: 5.4 K/UL — SIGNIFICANT CHANGE UP (ref 3.8–10.5)

## 2019-06-03 PROCEDURE — 99213 OFFICE O/P EST LOW 20 MIN: CPT

## 2019-06-03 NOTE — ASSESSMENT
[FreeTextEntry1] : 62yo M w/ h/o stage I A. grade 3 follicular lymphoma 2010 treated with 3 cycles of R. CHOP followed by radiation therapy. He has remained in remission ever since. Last imaging done 7/2018. \par He remains well, f/u w/ PMD\par RTC 1 year

## 2019-06-03 NOTE — HISTORY OF PRESENT ILLNESS
[de-identified] : Patient diagnosed with stage I A. grade 3 follicular lymphoma 2010 treated with 3 cycles of R. CHOP followed by radiation therapy he has remained in remission ever since.  [de-identified] : Doing well, no complaints. \par \par CT C/A/P 7/2/18: No evidence of recurrent disease.

## 2019-06-03 NOTE — REASON FOR VISIT
[Follow-Up Visit] : a follow-up visit for [FreeTextEntry2] : Stage 1 Follicular grade 3 lymphoma 2010 treated with RCHOP times 3 followed by RT

## 2019-06-04 LAB
ALBUMIN SERPL ELPH-MCNC: 4.3 G/DL
ALP BLD-CCNC: 39 U/L
ALT SERPL-CCNC: 16 U/L
ANION GAP SERPL CALC-SCNC: 11 MMOL/L
AST SERPL-CCNC: 15 U/L
BILIRUB SERPL-MCNC: 0.4 MG/DL
BUN SERPL-MCNC: 16 MG/DL
CALCIUM SERPL-MCNC: 9.1 MG/DL
CHLORIDE SERPL-SCNC: 105 MMOL/L
CO2 SERPL-SCNC: 26 MMOL/L
CREAT SERPL-MCNC: 1.18 MG/DL
GLUCOSE SERPL-MCNC: 80 MG/DL
LDH SERPL-CCNC: 190 U/L
POTASSIUM SERPL-SCNC: 4.2 MMOL/L
PROT SERPL-MCNC: 6.3 G/DL
SODIUM SERPL-SCNC: 142 MMOL/L

## 2019-10-25 ENCOUNTER — APPOINTMENT (OUTPATIENT)
Dept: CARDIOLOGY | Facility: CLINIC | Age: 64
End: 2019-10-25
Payer: MEDICARE

## 2019-10-25 ENCOUNTER — NON-APPOINTMENT (OUTPATIENT)
Age: 64
End: 2019-10-25

## 2019-10-25 VITALS
HEIGHT: 70 IN | WEIGHT: 190 LBS | DIASTOLIC BLOOD PRESSURE: 88 MMHG | OXYGEN SATURATION: 96 % | HEART RATE: 56 BPM | SYSTOLIC BLOOD PRESSURE: 134 MMHG | BODY MASS INDEX: 27.2 KG/M2

## 2019-10-25 VITALS — SYSTOLIC BLOOD PRESSURE: 110 MMHG | DIASTOLIC BLOOD PRESSURE: 70 MMHG

## 2019-10-25 PROCEDURE — 99204 OFFICE O/P NEW MOD 45 MIN: CPT

## 2019-10-25 PROCEDURE — 93000 ELECTROCARDIOGRAM COMPLETE: CPT

## 2019-10-25 NOTE — HISTORY OF PRESENT ILLNESS
[FreeTextEntry1] : 64 year old man with history of lymphoma, PSVT, HTN, BPH presents for an initial cardiac evaluation. \par \par He had PSVT (palpitations) that started in 2005. He had it rarely until 2016. It has now resolved with Verapmil and lopressor. Weight loss has also helped. \par He ahs noted that he will feel flutter now for seconds that happen every few months. \par \par He   denies any chest pain, PND, orthopnea, lower extremity edema, near syncope, syncope, strokelike symptoms. \par He walks for exercise. He walks about 3.5 miles a day, and he does yoga. He denies any anginal symptoms. He denies any blurry vision, headaches. \par

## 2019-10-25 NOTE — DISCUSSION/SUMMARY
[FreeTextEntry1] : 64 year man with a history as listed presents for an initial cardiac evaluation. \par Neymar is doing well overall. He denies any anginal symptoms. Clinically he is euvolemic on exam. His EKG did not reveal any significant ischemic changes. Though he has a left axis deviation and early R wave transition. \par He will get a 2d echo to assess for any  new structural heart disease, changes in valvular and ventricular function. He will undergo a treadmill exercise stress test to define exercise tolerance, rule out exertional hypertensive responses, assess for exercise induced arrhythmias and rule out ischemia from obstructive CAD. \par Based on his lipid profile he should get statin therapy. He will fax me his latest labs. He will also get a carotid doppler to rule out atherosclerosis. \par Exercise and diet counseling was performed in order to reduce her future cardiovascular risk. \par He will followup with me in 1 year or sooner if necessary.

## 2019-10-25 NOTE — PHYSICAL EXAM
[Well Groomed] : well groomed [General Appearance - In No Acute Distress] : no acute distress [Normal Conjunctiva] : the conjunctiva exhibited no abnormalities [Eyelids - No Xanthelasma] : the eyelids demonstrated no xanthelasmas [Normal Oral Mucosa] : normal oral mucosa [No Oral Pallor] : no oral pallor [No Oral Cyanosis] : no oral cyanosis [Normal Jugular Venous A Waves Present] : normal jugular venous A waves present [Normal Jugular Venous V Waves Present] : normal jugular venous V waves present [No Jugular Venous Coffey A Waves] : no jugular venous coffey A waves [Normal Rate] : normal [Rhythm Regular] : regular [Normal S1] : normal S1 [Normal S2] : normal S2 [No Gallop] : no gallop heard [No Murmur] : no murmurs heard [2+] : left 2+ [Left Carotid Bruit] : no bruit heard over the left carotid [Right Carotid Bruit] : no bruit heard over the right carotid [No Pitting Edema] : no pitting edema present [Bruit] : no bruit heard [Exaggerated Use Of Accessory Muscles For Inspiration] : no accessory muscle use [Respiration, Rhythm And Depth] : normal respiratory rhythm and effort [Auscultation Breath Sounds / Voice Sounds] : lungs were clear to auscultation bilaterally [Abdomen Soft] : soft [Abdomen Tenderness] : non-tender [Abdomen Mass (___ Cm)] : no abdominal mass palpated [Abnormal Walk] : normal gait [Gait - Sufficient For Exercise Testing] : the gait was sufficient for exercise testing [Skin Color & Pigmentation] : normal skin color and pigmentation [No Venous Stasis] : no venous stasis [] : no rash [Skin Lesions] : no skin lesions [No Skin Ulcers] : no skin ulcer [No Xanthoma] : no  xanthoma was observed [Oriented To Time, Place, And Person] : oriented to person, place, and time [Affect] : the affect was normal [Mood] : the mood was normal [No Anxiety] : not feeling anxious

## 2019-10-31 ENCOUNTER — APPOINTMENT (OUTPATIENT)
Dept: CARDIOLOGY | Facility: CLINIC | Age: 64
End: 2019-10-31
Payer: MEDICARE

## 2019-10-31 PROCEDURE — 93306 TTE W/DOPPLER COMPLETE: CPT

## 2019-11-07 ENCOUNTER — APPOINTMENT (OUTPATIENT)
Dept: CARDIOLOGY | Facility: CLINIC | Age: 64
End: 2019-11-07
Payer: MEDICARE

## 2019-11-07 PROCEDURE — 93880 EXTRACRANIAL BILAT STUDY: CPT

## 2019-11-26 ENCOUNTER — APPOINTMENT (OUTPATIENT)
Dept: OTOLARYNGOLOGY | Facility: CLINIC | Age: 64
End: 2019-11-26
Payer: MEDICARE

## 2019-11-26 VITALS
DIASTOLIC BLOOD PRESSURE: 83 MMHG | SYSTOLIC BLOOD PRESSURE: 124 MMHG | WEIGHT: 185 LBS | HEART RATE: 69 BPM | HEIGHT: 70 IN | BODY MASS INDEX: 26.48 KG/M2

## 2019-11-26 DIAGNOSIS — R09.89 OTHER SPECIFIED SYMPTOMS AND SIGNS INVOLVING THE CIRCULATORY AND RESPIRATORY SYSTEMS: ICD-10-CM

## 2019-11-26 PROCEDURE — 31575 DIAGNOSTIC LARYNGOSCOPY: CPT

## 2019-11-26 PROCEDURE — 99214 OFFICE O/P EST MOD 30 MIN: CPT | Mod: 25

## 2019-11-26 RX ORDER — MELOXICAM 7.5 MG/1
7.5 TABLET ORAL
Qty: 30 | Refills: 0 | Status: DISCONTINUED | COMMUNITY
Start: 2019-04-04 | End: 2019-11-26

## 2019-11-26 NOTE — PROCEDURE
[de-identified] : Fiberoptic Laryngoscopy (39421)\par \par Procedure performed: Fiberoptic Laryngeal Endoscopy - Diagnostic\par Pre-op/post op indication: Dysphagia\par Patient was unable to cooperate with mirror. After informed verbal consent is obtained, the fiberoptic nasal endoscope # []  is passed via the [ right ][ left ] nasal cavity. \par Findings: base of tongue and vallecula clear, hypopharynx normal without pooled secretions, crisp epiglottis, no evidence of laryngomalacia, bilateral vocal fold mobility full and symmetric. There was  significant arytenoids edema and  erythema seen , without  mass or lesions..\par

## 2019-11-26 NOTE — REASON FOR VISIT
[Initial Evaluation] : an initial evaluation for [Other: _____] : [unfilled] [FreeTextEntry2] : Long time patient of Dr. Howe, initial visit today for globus sensation for the past few months, history of lymphoma.

## 2019-11-26 NOTE — HISTORY OF PRESENT ILLNESS
[de-identified] : 64 year old male long time patient of Dr. Howe, initial visit today for globus sensation for the past few months, history of lymphoma.  Patient states constantly feels as if something is stuck in throat.  Reports recent sore throat due to a cold, but otherwise denies throat pain.  Denies dysphagia, odynophagia, bleeding, hemoptysis, dyspnea, fevers and throat infections in the past year.  Tolerating eating and drinking with no coughing, choking or aspiration. Seen previously for tinnitus which resolved.

## 2019-12-03 ENCOUNTER — TRANSCRIPTION ENCOUNTER (OUTPATIENT)
Age: 64
End: 2019-12-03

## 2019-12-03 ENCOUNTER — APPOINTMENT (OUTPATIENT)
Dept: CARDIOLOGY | Facility: CLINIC | Age: 64
End: 2019-12-03
Payer: MEDICARE

## 2019-12-03 PROCEDURE — 93015 CV STRESS TEST SUPVJ I&R: CPT

## 2019-12-06 ENCOUNTER — TRANSCRIPTION ENCOUNTER (OUTPATIENT)
Age: 64
End: 2019-12-06

## 2020-02-25 ENCOUNTER — APPOINTMENT (OUTPATIENT)
Dept: OTOLARYNGOLOGY | Facility: CLINIC | Age: 65
End: 2020-02-25

## 2020-03-09 ENCOUNTER — RX RENEWAL (OUTPATIENT)
Age: 65
End: 2020-03-09

## 2020-05-13 ENCOUNTER — RX RENEWAL (OUTPATIENT)
Age: 65
End: 2020-05-13

## 2021-03-24 ENCOUNTER — APPOINTMENT (OUTPATIENT)
Dept: CARDIOLOGY | Facility: CLINIC | Age: 66
End: 2021-03-24
Payer: MEDICARE

## 2021-03-24 ENCOUNTER — NON-APPOINTMENT (OUTPATIENT)
Age: 66
End: 2021-03-24

## 2021-03-24 VITALS
BODY MASS INDEX: 27.49 KG/M2 | HEART RATE: 66 BPM | SYSTOLIC BLOOD PRESSURE: 153 MMHG | HEIGHT: 70 IN | OXYGEN SATURATION: 97 % | WEIGHT: 192 LBS | DIASTOLIC BLOOD PRESSURE: 98 MMHG

## 2021-03-24 PROCEDURE — 93000 ELECTROCARDIOGRAM COMPLETE: CPT

## 2021-03-24 PROCEDURE — 99214 OFFICE O/P EST MOD 30 MIN: CPT

## 2021-03-24 NOTE — DISCUSSION/SUMMARY
[FreeTextEntry1] : 64 year man with a history as listed presents for an initial cardiac evaluation. \par Neymar's blood pressure was reactive to dental pain. Now that his ental issues are resolved his bp has normalized. \par His chest pain was nonanginal. Clinically he is euvolemic on exam. His EKG did not reveal any significant ischemic changes. \par He will get a 2d echo to assess for any  new structural heart disease, changes in valvular and ventricular function. He will undergo a treadmill exercise stress test to define exercise tolerance, rule out exertional hypertensive responses, assess for exercise induced arrhythmias and rule out ischemia from obstructive CAD. \par he will continue with metoprolol and verapamil for his palpitations which has controlled his SVT and BP. \par Based on his former lipid profile he should get statin therapy. He will fax me his latest labs.  \par Exercise and diet counseling was performed in order to reduce her future cardiovascular risk. \par He will followup with me in 1 year or sooner if necessary.

## 2021-03-24 NOTE — PHYSICAL EXAM
[Well Groomed] : well groomed [General Appearance - In No Acute Distress] : no acute distress [Normal Conjunctiva] : the conjunctiva exhibited no abnormalities [Eyelids - No Xanthelasma] : the eyelids demonstrated no xanthelasmas [Normal Oral Mucosa] : normal oral mucosa [No Oral Pallor] : no oral pallor [No Oral Cyanosis] : no oral cyanosis [Normal Jugular Venous A Waves Present] : normal jugular venous A waves present [Normal Jugular Venous V Waves Present] : normal jugular venous V waves present [No Jugular Venous Coffey A Waves] : no jugular venous coffey A waves [Respiration, Rhythm And Depth] : normal respiratory rhythm and effort [Exaggerated Use Of Accessory Muscles For Inspiration] : no accessory muscle use [Auscultation Breath Sounds / Voice Sounds] : lungs were clear to auscultation bilaterally [Abdomen Soft] : soft [Abdomen Tenderness] : non-tender [Abdomen Mass (___ Cm)] : no abdominal mass palpated [Abnormal Walk] : normal gait [Gait - Sufficient For Exercise Testing] : the gait was sufficient for exercise testing [Skin Color & Pigmentation] : normal skin color and pigmentation [] : no rash [No Venous Stasis] : no venous stasis [Skin Lesions] : no skin lesions [No Skin Ulcers] : no skin ulcer [No Xanthoma] : no  xanthoma was observed [Oriented To Time, Place, And Person] : oriented to person, place, and time [Affect] : the affect was normal [Mood] : the mood was normal [No Anxiety] : not feeling anxious [Normal Rate] : normal [Rhythm Regular] : regular [Normal S1] : normal S1 [Normal S2] : normal S2 [No Gallop] : no gallop heard [No Murmur] : no murmurs heard [2+] : left 2+ [Right Carotid Bruit] : no bruit heard over the right carotid [Left Carotid Bruit] : no bruit heard over the left carotid [Bruit] : no bruit heard [No Pitting Edema] : no pitting edema present

## 2021-03-24 NOTE — HISTORY OF PRESENT ILLNESS
[FreeTextEntry1] : 65 year old man with history of lymphoma, PSVT, HTN, BPH presents for a followup cardiac evaluation. \par He had PSVT (palpitations) that started in 2005. He had it rarely until 2016. It has now resolved with Verapmil and lopressor.    \par \par He was last here in 2019. \par Recently was noted to have increased BP recently at his doctor visits (150s/90s) during his recent dental infection. He noted that he had mild chest tightness when his bp was elevated. He noted that since he stopped the clindamycin for a dental infection his bp improved. \par He   denies any dyspnea, PND, orthopnea, lower extremity edema, near syncope, syncope, strokelike symptoms. \par He walks for exercise.\par \par  He walks about 3.5 miles a day, and he does yoga. \par

## 2021-04-01 ENCOUNTER — APPOINTMENT (OUTPATIENT)
Dept: CARDIOLOGY | Facility: CLINIC | Age: 66
End: 2021-04-01
Payer: MEDICARE

## 2021-04-01 PROCEDURE — 93306 TTE W/DOPPLER COMPLETE: CPT

## 2021-04-26 ENCOUNTER — APPOINTMENT (OUTPATIENT)
Dept: CARDIOLOGY | Facility: CLINIC | Age: 66
End: 2021-04-26
Payer: MEDICARE

## 2021-04-26 PROCEDURE — 93015 CV STRESS TEST SUPVJ I&R: CPT

## 2021-05-21 ENCOUNTER — NON-APPOINTMENT (OUTPATIENT)
Age: 66
End: 2021-05-21

## 2021-05-21 PROCEDURE — 93015 CV STRESS TEST SUPVJ I&R: CPT

## 2021-07-12 DIAGNOSIS — M79.606 PAIN IN LEG, UNSPECIFIED: ICD-10-CM

## 2021-07-26 ENCOUNTER — TRANSCRIPTION ENCOUNTER (OUTPATIENT)
Age: 66
End: 2021-07-26

## 2021-08-11 ENCOUNTER — APPOINTMENT (OUTPATIENT)
Dept: CARDIOLOGY | Facility: CLINIC | Age: 66
End: 2021-08-11
Payer: MEDICARE

## 2021-08-11 ENCOUNTER — NON-APPOINTMENT (OUTPATIENT)
Age: 66
End: 2021-08-11

## 2021-08-11 VITALS
DIASTOLIC BLOOD PRESSURE: 76 MMHG | WEIGHT: 196 LBS | OXYGEN SATURATION: 96 % | HEART RATE: 74 BPM | BODY MASS INDEX: 28.06 KG/M2 | HEIGHT: 70 IN | SYSTOLIC BLOOD PRESSURE: 120 MMHG

## 2021-08-11 DIAGNOSIS — I73.9 PERIPHERAL VASCULAR DISEASE, UNSPECIFIED: ICD-10-CM

## 2021-08-11 PROCEDURE — 99214 OFFICE O/P EST MOD 30 MIN: CPT

## 2021-08-11 PROCEDURE — 93000 ELECTROCARDIOGRAM COMPLETE: CPT

## 2021-08-11 NOTE — DISCUSSION/SUMMARY
[FreeTextEntry1] : 65 year man with a history as listed presents for an initial cardiac evaluation. \par Neymar is doing overall well. Clinically he is euvolemic on exam. His EKG did not reveal any significant ischemic changes. \par his blood pressure has normalized. It may have been elevated post vaccine and now resolved. he will continue with metoprolol and verapamil for his palpitations which has controlled his SVT and BP. \par he has lower extremity claudication. I will check a lower arterial duplex. \par Based on his former lipid profile he should get statin therapy. He will fax me his latest labs after he get its. \par Exercise and diet counseling was performed in order to reduce her future cardiovascular risk. \par He will followup with me in 1 year or sooner if necessary.

## 2021-08-11 NOTE — CARDIOLOGY SUMMARY
[___] : [unfilled] [Normal] : normal [de-identified] : 8/11/21 Sinus  Rhythm \par LAFB [de-identified] : 4/26/21 14 mets excellent ET no ischemia [de-identified] : 4/1/21   normal systolic LV function with mild Ao dilatation 4.1cm.

## 2021-08-11 NOTE — PHYSICAL EXAM
[Well Groomed] : well groomed [General Appearance - In No Acute Distress] : no acute distress [Normal Conjunctiva] : the conjunctiva exhibited no abnormalities [Eyelids - No Xanthelasma] : the eyelids demonstrated no xanthelasmas [Normal Oral Mucosa] : normal oral mucosa [No Oral Pallor] : no oral pallor [No Oral Cyanosis] : no oral cyanosis [Normal Jugular Venous A Waves Present] : normal jugular venous A waves present [Normal Jugular Venous V Waves Present] : normal jugular venous V waves present [No Jugular Venous Coffey A Waves] : no jugular venous coffey A waves [Respiration, Rhythm And Depth] : normal respiratory rhythm and effort [Exaggerated Use Of Accessory Muscles For Inspiration] : no accessory muscle use [Auscultation Breath Sounds / Voice Sounds] : lungs were clear to auscultation bilaterally [Abdomen Soft] : soft [Abdomen Tenderness] : non-tender [Abdomen Mass (___ Cm)] : no abdominal mass palpated [Abnormal Walk] : normal gait [Gait - Sufficient For Exercise Testing] : the gait was sufficient for exercise testing [Skin Color & Pigmentation] : normal skin color and pigmentation [] : no rash [No Venous Stasis] : no venous stasis [Skin Lesions] : no skin lesions [No Skin Ulcers] : no skin ulcer [No Xanthoma] : no  xanthoma was observed [Oriented To Time, Place, And Person] : oriented to person, place, and time [Affect] : the affect was normal [Mood] : the mood was normal [No Anxiety] : not feeling anxious [Normal Rate] : normal [Rhythm Regular] : regular [Normal S1] : normal S1 [Normal S2] : normal S2 [No Gallop] : no gallop heard [No Murmur] : no murmurs heard [No Pitting Edema] : no pitting edema present [Right Carotid Bruit] : no bruit heard over the right carotid [Left Carotid Bruit] : no bruit heard over the left carotid [2+] : left 2+ [Bruit] : no bruit heard

## 2021-08-11 NOTE — HISTORY OF PRESENT ILLNESS
[FreeTextEntry1] : 65 year old man with history of lymphoma, PSVT, HTN, BPH presents for a followup cardiac evaluation. \par   \par Since his last visit he has been complaining of left shin pain when walking and resolves with rest. His blood pressure has normalized \par He   denies any dyspnea, PND, orthopnea, lower extremity edema, near syncope, syncope, strokelike symptoms. \par He walks for exercise.  He walks about 4 miles a day, and he does yoga. \par \par

## 2021-08-13 ENCOUNTER — APPOINTMENT (OUTPATIENT)
Dept: CARDIOLOGY | Facility: CLINIC | Age: 66
End: 2021-08-13
Payer: MEDICARE

## 2021-08-13 PROCEDURE — 93925 LOWER EXTREMITY STUDY: CPT

## 2022-10-06 ENCOUNTER — EMERGENCY (EMERGENCY)
Facility: HOSPITAL | Age: 67
LOS: 1 days | Discharge: HOME | End: 2022-10-06
Attending: EMERGENCY MEDICINE | Admitting: EMERGENCY MEDICINE
Payer: MEDICARE

## 2022-10-06 VITALS
HEART RATE: 78 BPM | SYSTOLIC BLOOD PRESSURE: 142 MMHG | RESPIRATION RATE: 16 BRPM | OXYGEN SATURATION: 96 % | DIASTOLIC BLOOD PRESSURE: 93 MMHG | WEIGHT: 184.97 LBS | HEIGHT: 70.5 IN | TEMPERATURE: 97 F

## 2022-10-06 VITALS
TEMPERATURE: 98 F | OXYGEN SATURATION: 99 % | DIASTOLIC BLOOD PRESSURE: 94 MMHG | RESPIRATION RATE: 19 BRPM | HEART RATE: 70 BPM | SYSTOLIC BLOOD PRESSURE: 141 MMHG

## 2022-10-06 LAB
ALBUMIN SERPL ELPH-MCNC: 3.8 G/DL — SIGNIFICANT CHANGE UP (ref 3.3–5)
ALP SERPL-CCNC: 47 U/L — SIGNIFICANT CHANGE UP (ref 40–120)
ALT FLD-CCNC: 24 U/L — SIGNIFICANT CHANGE UP (ref 12–78)
ANION GAP SERPL CALC-SCNC: 4 MMOL/L — LOW (ref 5–17)
APTT BLD: 32 SEC — SIGNIFICANT CHANGE UP (ref 27.5–35.5)
AST SERPL-CCNC: 15 U/L — SIGNIFICANT CHANGE UP (ref 15–37)
BASOPHILS # BLD AUTO: 0.03 K/UL — SIGNIFICANT CHANGE UP (ref 0–0.2)
BASOPHILS NFR BLD AUTO: 0.5 % — SIGNIFICANT CHANGE UP (ref 0–2)
BILIRUB SERPL-MCNC: 0.5 MG/DL — SIGNIFICANT CHANGE UP (ref 0.2–1.2)
BUN SERPL-MCNC: 23 MG/DL — SIGNIFICANT CHANGE UP (ref 7–23)
CALCIUM SERPL-MCNC: 9.1 MG/DL — SIGNIFICANT CHANGE UP (ref 8.5–10.1)
CHLORIDE SERPL-SCNC: 107 MMOL/L — SIGNIFICANT CHANGE UP (ref 96–108)
CO2 SERPL-SCNC: 30 MMOL/L — SIGNIFICANT CHANGE UP (ref 22–31)
CREAT SERPL-MCNC: 1.4 MG/DL — HIGH (ref 0.5–1.3)
EGFR: 55 ML/MIN/1.73M2 — LOW
EOSINOPHIL # BLD AUTO: 0.1 K/UL — SIGNIFICANT CHANGE UP (ref 0–0.5)
EOSINOPHIL NFR BLD AUTO: 1.8 % — SIGNIFICANT CHANGE UP (ref 0–6)
GLUCOSE SERPL-MCNC: 107 MG/DL — HIGH (ref 70–99)
HCT VFR BLD CALC: 47.2 % — SIGNIFICANT CHANGE UP (ref 39–50)
HGB BLD-MCNC: 15.5 G/DL — SIGNIFICANT CHANGE UP (ref 13–17)
IMM GRANULOCYTES NFR BLD AUTO: 0.2 % — SIGNIFICANT CHANGE UP (ref 0–0.9)
INR BLD: 1.04 RATIO — SIGNIFICANT CHANGE UP (ref 0.88–1.16)
LYMPHOCYTES # BLD AUTO: 1.44 K/UL — SIGNIFICANT CHANGE UP (ref 1–3.3)
LYMPHOCYTES # BLD AUTO: 25.3 % — SIGNIFICANT CHANGE UP (ref 13–44)
MCHC RBC-ENTMCNC: 31.3 PG — SIGNIFICANT CHANGE UP (ref 27–34)
MCHC RBC-ENTMCNC: 32.8 GM/DL — SIGNIFICANT CHANGE UP (ref 32–36)
MCV RBC AUTO: 95.2 FL — SIGNIFICANT CHANGE UP (ref 80–100)
MONOCYTES # BLD AUTO: 0.64 K/UL — SIGNIFICANT CHANGE UP (ref 0–0.9)
MONOCYTES NFR BLD AUTO: 11.2 % — SIGNIFICANT CHANGE UP (ref 2–14)
NEUTROPHILS # BLD AUTO: 3.47 K/UL — SIGNIFICANT CHANGE UP (ref 1.8–7.4)
NEUTROPHILS NFR BLD AUTO: 61 % — SIGNIFICANT CHANGE UP (ref 43–77)
NRBC # BLD: 0 /100 WBCS — SIGNIFICANT CHANGE UP (ref 0–0)
PLATELET # BLD AUTO: 168 K/UL — SIGNIFICANT CHANGE UP (ref 150–400)
POTASSIUM SERPL-MCNC: 4.4 MMOL/L — SIGNIFICANT CHANGE UP (ref 3.5–5.3)
POTASSIUM SERPL-SCNC: 4.4 MMOL/L — SIGNIFICANT CHANGE UP (ref 3.5–5.3)
PROT SERPL-MCNC: 7.4 G/DL — SIGNIFICANT CHANGE UP (ref 6–8.3)
PROTHROM AB SERPL-ACNC: 12.2 SEC — SIGNIFICANT CHANGE UP (ref 10.5–13.4)
RBC # BLD: 4.96 M/UL — SIGNIFICANT CHANGE UP (ref 4.2–5.8)
RBC # FLD: 13 % — SIGNIFICANT CHANGE UP (ref 10.3–14.5)
SODIUM SERPL-SCNC: 141 MMOL/L — SIGNIFICANT CHANGE UP (ref 135–145)
TROPONIN I, HIGH SENSITIVITY RESULT: 6.4 NG/L — SIGNIFICANT CHANGE UP
WBC # BLD: 5.69 K/UL — SIGNIFICANT CHANGE UP (ref 3.8–10.5)
WBC # FLD AUTO: 5.69 K/UL — SIGNIFICANT CHANGE UP (ref 3.8–10.5)

## 2022-10-06 PROCEDURE — 70496 CT ANGIOGRAPHY HEAD: CPT | Mod: MA

## 2022-10-06 PROCEDURE — 80053 COMPREHEN METABOLIC PANEL: CPT

## 2022-10-06 PROCEDURE — 85610 PROTHROMBIN TIME: CPT

## 2022-10-06 PROCEDURE — 93010 ELECTROCARDIOGRAM REPORT: CPT

## 2022-10-06 PROCEDURE — 70498 CT ANGIOGRAPHY NECK: CPT | Mod: 26,MA

## 2022-10-06 PROCEDURE — 70498 CT ANGIOGRAPHY NECK: CPT | Mod: MA

## 2022-10-06 PROCEDURE — 85730 THROMBOPLASTIN TIME PARTIAL: CPT

## 2022-10-06 PROCEDURE — 99285 EMERGENCY DEPT VISIT HI MDM: CPT | Mod: 25

## 2022-10-06 PROCEDURE — 93005 ELECTROCARDIOGRAM TRACING: CPT

## 2022-10-06 PROCEDURE — 0042T: CPT | Mod: MA

## 2022-10-06 PROCEDURE — 70450 CT HEAD/BRAIN W/O DYE: CPT | Mod: MA

## 2022-10-06 PROCEDURE — 85025 COMPLETE CBC W/AUTO DIFF WBC: CPT

## 2022-10-06 PROCEDURE — 70496 CT ANGIOGRAPHY HEAD: CPT | Mod: 26,MA

## 2022-10-06 PROCEDURE — 99285 EMERGENCY DEPT VISIT HI MDM: CPT

## 2022-10-06 PROCEDURE — 36415 COLL VENOUS BLD VENIPUNCTURE: CPT

## 2022-10-06 PROCEDURE — 84484 ASSAY OF TROPONIN QUANT: CPT

## 2022-10-06 NOTE — ED PROVIDER NOTE - OBJECTIVE STATEMENT
66-year-old male with history of hypertension and SVT not on any anticoagulation presents to the emergency department states that yesterday around 2 PM while waiting on line at the [employer] developed transient loss of vision of his left eye states that it felt blurry told some dizziness but it resolved in a few minutes.  Patient went to go see ophthalmology today and was examined and was told he may have vitreous detachment and to follow-up in the office.  Patient states that he currently feels well and states vision is back to normal.

## 2022-10-06 NOTE — ED PROVIDER NOTE - NSICDXPASTMEDICALHX_GEN_ALL_CORE_FT
PAST MEDICAL HISTORY:  Hypertension     Non-Hodgkin lymphoma Treated with Chemotherapy and Radiation Therapy    Supraventricular Tachycardia     Ulcerative colitis

## 2022-10-06 NOTE — ED PROVIDER NOTE - PROGRESS NOTE DETAILS
Patient feeling well, having no active symptoms, labs, EKG, CT scan reviewed no acute findings, patient feels comfortable going home and following up as outpatient.

## 2022-10-06 NOTE — ED ADULT NURSE NOTE - NS ED NOTE ABUSE RESPONSE YN
Medication refill received from [facility] for azelastine  Nasal spray and Clonazepam.    LOV:10/18/21    NOV: Visit date not found  Last refilled:   Azelastine Nasal Spray - 10/18/21 #30 mL w 0  Clonazepam - 4/4/22  #30 w 0    No protocol. Please advise.    Pharmacy and RX pending, please sign if approved.   Yes

## 2022-10-06 NOTE — ED PROVIDER NOTE - NSICDXPASTSURGICALHX_GEN_ALL_CORE_FT
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

## 2022-10-06 NOTE — ED PROVIDER NOTE - CLINICAL SUMMARY MEDICAL DECISION MAKING FREE TEXT BOX
66-year-old male with transient loss of vision rule out TIA, follow-up CT head, CT angio, send CBC, CMP EKG and reevaluate patient

## 2022-10-06 NOTE — ED PROVIDER NOTE - PATIENT PORTAL LINK FT
You can access the FollowMyHealth Patient Portal offered by [facility] by registering at the following website: [email or web address]. By joining [facility]’s FollowMyHealth portal, you will also be able to view your health information using other applications (apps) compatible with our system.

## 2022-10-06 NOTE — ED PROVIDER NOTE - NSFOLLOWUPINSTRUCTIONS_ED_ALL_ED_FT
Follow up with your opthalmologist as directed.    WHAT YOU NEED TO KNOW:    Eye floaters are specks or spots in your vision. They are dark and may look like squiggly lines, cobwebs, or strings. These spots or specks move around in your field of vision. The floaters seem to drift or dart away if you try to look straight at them. Eye floaters are also called visual floaters.    DISCHARGE INSTRUCTIONS:    Return to the emergency department if:   •You suddenly see more floaters, or flashing lights.      •You have a loss of vision, or a change in your vision.      Call your doctor if:   •You start to see more floaters over time.      •You have eye pain or blurred vision, or light hurts your eyes.      •You have questions or concerns about your care.      Protect your eyes:   •Get annual eye exams. Your ophthalmologist or optometrist will examine your eyes and test your vision.      •Wear a hat and sunglasses when you are outdoors. Make sure the sunglasses have ultraviolet (UV) protection lenses to protect your eyes.      •Manage your health conditions. See your provider regularly if you have a health condition that may affect your vision, such as diabetes or high blood pressure.      Follow up with your doctor or ophthalmologist as directed: Write down your questions so you remember to ask them during your visits.       © Copyright [employer] 2022           back to top                          © Copyright [employer] 2022

## 2022-10-06 NOTE — ED ADULT NURSE NOTE - OBJECTIVE STATEMENT
Pt. received alert and oriented x3 with chief complaint of left sided visual loss on 10/5/2022 around 1400. Pt. states symptoms have resolved.

## 2022-12-12 ENCOUNTER — RESULT CHARGE (OUTPATIENT)
Age: 67
End: 2022-12-12

## 2022-12-13 ENCOUNTER — APPOINTMENT (OUTPATIENT)
Dept: CARDIOLOGY | Facility: CLINIC | Age: 67
End: 2022-12-13

## 2022-12-13 ENCOUNTER — NON-APPOINTMENT (OUTPATIENT)
Age: 67
End: 2022-12-13

## 2022-12-13 VITALS
HEIGHT: 70 IN | WEIGHT: 196 LBS | HEART RATE: 61 BPM | OXYGEN SATURATION: 97 % | SYSTOLIC BLOOD PRESSURE: 129 MMHG | BODY MASS INDEX: 28.06 KG/M2 | DIASTOLIC BLOOD PRESSURE: 80 MMHG

## 2022-12-13 VITALS — SYSTOLIC BLOOD PRESSURE: 110 MMHG | DIASTOLIC BLOOD PRESSURE: 70 MMHG

## 2022-12-13 DIAGNOSIS — H53.9 UNSPECIFIED VISUAL DISTURBANCE: ICD-10-CM

## 2022-12-13 PROCEDURE — 99214 OFFICE O/P EST MOD 30 MIN: CPT

## 2022-12-13 PROCEDURE — 93000 ELECTROCARDIOGRAM COMPLETE: CPT

## 2022-12-18 NOTE — END OF VISIT
[FreeTextEntry3] : I saw and evaluated the patient and discussed the care with the NP provider above on 12/13/2022 . I agree with the findings and plan as documented in the note above. he had a visual disturbance likely localized to a ophthalmologic issue. He will get a 2d echo to assess for any  new structural heart disease, changes in valvular and ventricular function. He will get a Zio Patch to rule out arrhythmias.

## 2022-12-18 NOTE — CARDIOLOGY SUMMARY
[de-identified] :  Sinus  Rhythm \par IRRR\par LAFB [de-identified] : 4/26/21 14 mets excellent ET no ischemia [de-identified] : 4/1/21   normal systolic LV function with mild Ao dilatation 4.1cm.

## 2022-12-18 NOTE — DISCUSSION/SUMMARY
[FreeTextEntry1] : 67 year man with a history as listed presents for an initial cardiac evaluation. \par Neymar is doing overall well. Clinically he is euvolemic on exam. His EKG did not reveal any significant ischemic changes. \par I have advised that we have a 2 week ZIO patch to assess for silent/PAF episodes in the setting of his acute, possible vascular event.\par He will also have an echocardiogram to assess for changes in ventricular function and/or cardiac structure.\par  \par He will continue with metoprolol and verapamil for his palpitations which has controlled his SVT and BP. \par I will obtain visit report from his ophthalmologist.\par \par Based on his former lipid profile he should be on statin therapy, however, I will obtain the most recent lipid profile that was done more recently to assess and discuss with him. \par Exercise and diet counseling was performed in order to reduce her future cardiovascular risk. \par

## 2022-12-18 NOTE — PHYSICAL EXAM
[Well Groomed] : well groomed [General Appearance - In No Acute Distress] : no acute distress [Eyelids - No Xanthelasma] : the eyelids demonstrated no xanthelasmas [Normal Oral Mucosa] : normal oral mucosa [No Oral Pallor] : no oral pallor [No Oral Cyanosis] : no oral cyanosis [Normal Jugular Venous A Waves Present] : normal jugular venous A waves present [Normal Jugular Venous V Waves Present] : normal jugular venous V waves present [No Jugular Venous Coffey A Waves] : no jugular venous coffey A waves [Respiration, Rhythm And Depth] : normal respiratory rhythm and effort [Exaggerated Use Of Accessory Muscles For Inspiration] : no accessory muscle use [Auscultation Breath Sounds / Voice Sounds] : lungs were clear to auscultation bilaterally [Abdomen Soft] : soft [Abdomen Tenderness] : non-tender [Abdomen Mass (___ Cm)] : no abdominal mass palpated [Abnormal Walk] : normal gait [Gait - Sufficient For Exercise Testing] : the gait was sufficient for exercise testing [Skin Color & Pigmentation] : normal skin color and pigmentation [] : no rash [No Venous Stasis] : no venous stasis [Skin Lesions] : no skin lesions [No Skin Ulcers] : no skin ulcer [No Xanthoma] : no  xanthoma was observed [Oriented To Time, Place, And Person] : oriented to person, place, and time [Affect] : the affect was normal [Mood] : the mood was normal [No Anxiety] : not feeling anxious [Normal Rate] : normal [2+] : left 2+ [No Pitting Edema] : no pitting edema present [Well Developed] : well developed [Well Nourished] : well nourished [No Acute Distress] : no acute distress [Normal Conjunctiva] : normal conjunctiva [Normal Venous Pressure] : normal venous pressure [No Carotid Bruit] : no carotid bruit [Normal S1, S2] : normal S1, S2 [No Murmur] : no murmur [No Rub] : no rub [No Gallop] : no gallop [Rhythm Regular] : regular [Normal S1] : normal S1 [Normal S2] : normal S2 [Clear Lung Fields] : clear lung fields [Good Air Entry] : good air entry [No Respiratory Distress] : no respiratory distress  [Soft] : abdomen soft [Non Tender] : non-tender [No Masses/organomegaly] : no masses/organomegaly [Normal Bowel Sounds] : normal bowel sounds [Normal Gait] : normal gait [No Edema] : no edema [No Cyanosis] : no cyanosis [No Clubbing] : no clubbing [No Varicosities] : no varicosities [No Rash] : no rash [No Skin Lesions] : no skin lesions [Moves all extremities] : moves all extremities [No Focal Deficits] : no focal deficits [Normal Speech] : normal speech [Alert and Oriented] : alert and oriented [Normal memory] : normal memory [Right Carotid Bruit] : no bruit heard over the right carotid [Left Carotid Bruit] : no bruit heard over the left carotid [Bruit] : no bruit heard

## 2022-12-18 NOTE — HISTORY OF PRESENT ILLNESS
[FreeTextEntry1] : 67 she did not take me back yet" no here.year old man with history of lymphoma, PSVT, HTN, BPH presents for a followup cardiac evaluation. \par   \par He arrives today for hospital follow-up.  He presented to [facility] on 12/6/2022 after a 3-minute episode of blurred vision of his left eye.  Hospital work-up from the emergency room including negative CT of the head.  CTA head and neck demonstrated some atherosclerotic disease of his carotids but no obstructive disease.\par He has since followed up with his ophthalmologist, and initially was told that a viscous material was detached in the back of his eye that can happen with aging process.  He has since followed up again and the detached tissue was no longer present.  He was advised by his family to follow-up with his cardiologist.\par \par He has not since developed symptoms.  His vision has been uninterrupted.\par He  denies any dyspnea, PND, orthopnea, lower extremity edema, near syncope, syncope, strokelike symptoms. \par He walks for exercise. \par  He walks about 4 miles a day, and he does yoga. \par \par

## 2022-12-20 ENCOUNTER — APPOINTMENT (OUTPATIENT)
Dept: CARDIOLOGY | Facility: CLINIC | Age: 67
End: 2022-12-20

## 2022-12-20 PROCEDURE — 93306 TTE W/DOPPLER COMPLETE: CPT

## 2023-02-17 RX ORDER — VERAPAMIL HYDROCHLORIDE 80 MG/1
80 TABLET ORAL
Qty: 180 | Refills: 3 | Status: DISCONTINUED | COMMUNITY
Start: 2018-06-20 | End: 2023-02-17

## 2023-04-03 ENCOUNTER — APPOINTMENT (OUTPATIENT)
Dept: SURGERY | Facility: CLINIC | Age: 68
End: 2023-04-03
Payer: MEDICARE

## 2023-04-03 VITALS
TEMPERATURE: 98 F | WEIGHT: 178 LBS | BODY MASS INDEX: 25.48 KG/M2 | HEIGHT: 70 IN | HEART RATE: 54 BPM | DIASTOLIC BLOOD PRESSURE: 97 MMHG | SYSTOLIC BLOOD PRESSURE: 152 MMHG

## 2023-04-03 PROCEDURE — 99203 OFFICE O/P NEW LOW 30 MIN: CPT

## 2023-04-10 ENCOUNTER — APPOINTMENT (OUTPATIENT)
Dept: SURGERY | Facility: CLINIC | Age: 68
End: 2023-04-10

## 2023-04-28 ENCOUNTER — OUTPATIENT (OUTPATIENT)
Dept: OUTPATIENT SERVICES | Facility: HOSPITAL | Age: 68
LOS: 1 days | Discharge: HOME | End: 2023-04-28
Payer: MEDICARE

## 2023-04-28 VITALS
HEART RATE: 55 BPM | RESPIRATION RATE: 16 BRPM | DIASTOLIC BLOOD PRESSURE: 93 MMHG | TEMPERATURE: 98 F | HEIGHT: 70 IN | WEIGHT: 189.6 LBS | OXYGEN SATURATION: 99 % | SYSTOLIC BLOOD PRESSURE: 147 MMHG

## 2023-04-28 DIAGNOSIS — Z98.890 OTHER SPECIFIED POSTPROCEDURAL STATES: Chronic | ICD-10-CM

## 2023-04-28 DIAGNOSIS — K40.91 UNILATERAL INGUINAL HERNIA, WITHOUT OBSTRUCTION OR GANGRENE, RECURRENT: ICD-10-CM

## 2023-04-28 DIAGNOSIS — Z01.812 ENCOUNTER FOR PREPROCEDURAL LABORATORY EXAMINATION: ICD-10-CM

## 2023-04-28 DIAGNOSIS — I10 ESSENTIAL (PRIMARY) HYPERTENSION: ICD-10-CM

## 2023-04-28 DIAGNOSIS — I47.1 SUPRAVENTRICULAR TACHYCARDIA: ICD-10-CM

## 2023-04-28 DIAGNOSIS — Z01.818 ENCOUNTER FOR OTHER PREPROCEDURAL EXAMINATION: ICD-10-CM

## 2023-04-28 LAB
ANION GAP SERPL CALC-SCNC: 3 MMOL/L — LOW (ref 5–17)
BUN SERPL-MCNC: 17 MG/DL — SIGNIFICANT CHANGE UP (ref 7–23)
CALCIUM SERPL-MCNC: 9.1 MG/DL — SIGNIFICANT CHANGE UP (ref 8.5–10.1)
CHLORIDE SERPL-SCNC: 107 MMOL/L — SIGNIFICANT CHANGE UP (ref 96–108)
CO2 SERPL-SCNC: 29 MMOL/L — SIGNIFICANT CHANGE UP (ref 22–31)
CREAT SERPL-MCNC: 1.11 MG/DL — SIGNIFICANT CHANGE UP (ref 0.5–1.3)
EGFR: 72 ML/MIN/1.73M2 — SIGNIFICANT CHANGE UP
GLUCOSE SERPL-MCNC: 104 MG/DL — HIGH (ref 70–99)
HCT VFR BLD CALC: 42.3 % — SIGNIFICANT CHANGE UP (ref 39–50)
HGB BLD-MCNC: 14.4 G/DL — SIGNIFICANT CHANGE UP (ref 13–17)
MCHC RBC-ENTMCNC: 31.5 PG — SIGNIFICANT CHANGE UP (ref 27–34)
MCHC RBC-ENTMCNC: 34 G/DL — SIGNIFICANT CHANGE UP (ref 32–36)
MCV RBC AUTO: 92.6 FL — SIGNIFICANT CHANGE UP (ref 80–100)
NRBC # BLD: 0 /100 WBCS — SIGNIFICANT CHANGE UP (ref 0–0)
PLATELET # BLD AUTO: 163 K/UL — SIGNIFICANT CHANGE UP (ref 150–400)
POTASSIUM SERPL-MCNC: 3.9 MMOL/L — SIGNIFICANT CHANGE UP (ref 3.5–5.3)
POTASSIUM SERPL-SCNC: 3.9 MMOL/L — SIGNIFICANT CHANGE UP (ref 3.5–5.3)
RBC # BLD: 4.57 M/UL — SIGNIFICANT CHANGE UP (ref 4.2–5.8)
RBC # FLD: 14.2 % — SIGNIFICANT CHANGE UP (ref 10.3–14.5)
SODIUM SERPL-SCNC: 139 MMOL/L — SIGNIFICANT CHANGE UP (ref 135–145)
WBC # BLD: 3.95 K/UL — SIGNIFICANT CHANGE UP (ref 3.8–10.5)
WBC # FLD AUTO: 3.95 K/UL — SIGNIFICANT CHANGE UP (ref 3.8–10.5)

## 2023-04-28 PROCEDURE — 93010 ELECTROCARDIOGRAM REPORT: CPT

## 2023-04-28 RX ORDER — SODIUM CHLORIDE 9 MG/ML
3 INJECTION INTRAMUSCULAR; INTRAVENOUS; SUBCUTANEOUS EVERY 8 HOURS
Refills: 0 | Status: DISCONTINUED | OUTPATIENT
Start: 2023-05-09 | End: 2023-05-09

## 2023-04-28 NOTE — H&P PST ADULT - NEGATIVE CARDIOVASCULAR SYMPTOMS
no chest pain/no dyspnea on exertion/no orthopnea/no paroxysmal nocturnal dyspnea/no claudication no chest pain/no palpitations/no dyspnea on exertion/no claudication

## 2023-04-28 NOTE — H&P PST ADULT - NEGATIVE GENERAL GENITOURINARY SYMPTOMS
no hematuria/no renal colic/no bladder infections/no dysuria/normal urinary frequency no hematuria/no bladder infections/no dysuria

## 2023-04-28 NOTE — H&P PST ADULT - ASSESSMENT
67M pmh htn, NHL (sx, RCHOP, RT 2010), SVT (on metoprolol), bph c/o right inguinal swelling and discomfort found to have unilateral inguinal hernia here for PST for scheduled recurrent right inguinal hernia repair  CAPRINI SCORE    AGE RELATED RISK FACTORS                                                       MOBILITY RELATED FACTORS  [ ] Age 41-60 years                                            (1 Point)                  [ ] Bed rest                                                        (1 Point)  [ x] Age: 61-74 years                                           (2 Points)                [ ] Plaster cast                                                   (2 Points)  [ ] Age= 75 years                                              (3 Points)                 [ ] Bed bound for more than 72 hours                   (2 Points)    DISEASE RELATED RISK FACTORS                                               GENDER SPECIFIC FACTORS  [ ] Edema in the lower extremities                       (1 Point)                  [ ] Pregnancy                                                     (1 Point)  [ ] Varicose veins                                               (1 Point)                  [ ] Post-partum < 6 weeks                                   (1 Point)             [x ] BMI > 25 Kg/m2                                            (1 Point)                  [ ] Hormonal therapy  or oral contraception            (1 Point)                 [ ] Sepsis (in the previous month)                        (1 Point)                  [ ] History of pregnancy complications  [ ] Pneumonia or serious lung disease                                               [ ] Unexplained or recurrent                       (1 Point)           (in the previous month)                               (1 Point)  [ ] Abnormal pulmonary function test                     (1 Point)                 SURGERY RELATED RISK FACTORS  [ ] Acute myocardial infarction                              (1 Point)                 [ ]  Section                                            (1 Point)  [ ] Congestive heart failure (in the previous month)  (1 Point)                 [ ] Minor surgery                                                 (1 Point)   [ ] Inflammatory bowel disease                             (1 Point)                 [ ] Arthroscopic surgery                                        (2 Points)  [ ] Central venous access                                    (2 Points)                [x ] General surgery lasting more than 45 minutes   (2 Points)       [ ] Stroke (in the previous month)                          (5 Points)               [ ] Elective arthroplasty                                        (5 Points)                                                                                                                                               HEMATOLOGY RELATED FACTORS                                                 TRAUMA RELATED RISK FACTORS  [ ] Prior episodes of VTE                                     (3 Points)                 [ ] Fracture of the hip, pelvis, or leg                       (5 Points)  [ ] Positive family history for VTE                         (3 Points)                 [ ] Acute spinal cord injury (in the previous month)  (5 Points)  [ ] Prothrombin 36886 A                                      (3 Points)                 [ ] Paralysis  (less than 1 month)                          (5 Points)  [ ] Factor V Leiden                                             (3 Points)                 [ ] Multiple Trauma within 1 month                         (5 Points)  [ ] Lupus anticoagulants                                     (3 Points)                                                           [ ] Anticardiolipin antibodies                                (3 Points)                                                       [ ] High homocysteine in the blood                      (3 Points)                                             [ ] Other congenital or acquired thrombophilia       (3 Points)                                                [ ] Heparin induced thrombocytopenia                  (3 Points)                                          Total Score [        5  ]

## 2023-04-28 NOTE — H&P PST ADULT - NEGATIVE MUSCULOSKELETAL SYMPTOMS
no arthralgia/no arthritis/no joint swelling/no myalgia/no muscle cramps/no muscle weakness/no stiffness/no neck pain/no arm pain L/no back pain/no leg pain L/no leg pain R

## 2023-04-28 NOTE — H&P PST ADULT - HISTORY OF PRESENT ILLNESS
.......58 y/o male diagnosed with b/l inguinal hernias in 2010 on full body Cat Scan performed secondary to Non-Hodgkin's Lymphoma.  Patient states "the right side started to bulge in 01/2013".  Patient denies pain/discomfort.  Patient presents to PAST today for preop evaluation for Laparoscopic Bilateral Inguinal Hernia Repair scheduled to be done on 4/5/2013. 67M pmh htn, NHL (sx, RCHOP, RT 2010), SVT (on metoprolol), bph c/o right inguinal swelling and discomfort found to have unilateral inguinal hernia here for PST for scheduled recurrent right inguinal hernia repair  This patient denies any fever, cough, sob, flu like symptoms or travel outside of the [place] in the past 30 days

## 2023-04-28 NOTE — H&P PST ADULT - PROBLEM SELECTOR PLAN 1
recurrent right inguinal hernia repair  Pre-op instructions given by RN, patient verbalized understanding  Chlorhexidine wash instructions given   cardiac clearance  Anesthesiologist to review PST labs, EKG, required clearances and optimization for surgery.

## 2023-04-28 NOTE — H&P PST ADULT - NEGATIVE MALE-SPECIFIC SYMPTOMS
no urethral discharge/no genital sores/no scrotal mass L/no scrotal mass R no scrotal mass L/no scrotal mass R

## 2023-04-28 NOTE — H&P PST ADULT - NSICDXPASTSURGICALHX_GEN_ALL_CORE_FT
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

## 2023-05-04 ENCOUNTER — NON-APPOINTMENT (OUTPATIENT)
Age: 68
End: 2023-05-04

## 2023-05-04 ENCOUNTER — APPOINTMENT (OUTPATIENT)
Dept: CARDIOLOGY | Facility: CLINIC | Age: 68
End: 2023-05-04
Payer: MEDICARE

## 2023-05-04 VITALS
DIASTOLIC BLOOD PRESSURE: 97 MMHG | HEIGHT: 70 IN | BODY MASS INDEX: 26.77 KG/M2 | HEART RATE: 53 BPM | WEIGHT: 187 LBS | OXYGEN SATURATION: 94 % | SYSTOLIC BLOOD PRESSURE: 163 MMHG

## 2023-05-04 VITALS — SYSTOLIC BLOOD PRESSURE: 140 MMHG | DIASTOLIC BLOOD PRESSURE: 84 MMHG

## 2023-05-04 PROCEDURE — 99214 OFFICE O/P EST MOD 30 MIN: CPT

## 2023-05-04 PROCEDURE — 93000 ELECTROCARDIOGRAM COMPLETE: CPT | Mod: NC

## 2023-05-04 RX ORDER — OMEPRAZOLE 40 MG/1
40 CAPSULE, DELAYED RELEASE ORAL
Qty: 60 | Refills: 3 | Status: COMPLETED | COMMUNITY
Start: 2019-11-26 | End: 2023-05-04

## 2023-05-04 RX ORDER — RANITIDINE 300 MG/1
300 TABLET ORAL
Qty: 60 | Refills: 2 | Status: COMPLETED | COMMUNITY
Start: 2019-11-26 | End: 2023-05-04

## 2023-05-04 NOTE — CARDIOLOGY SUMMARY
[de-identified] :  Sinus  Rhythm \par LAFB [de-identified] : 4/26/21 14 mets excellent ET no ischemia [de-identified] : 4/1/21   normal systolic LV function with mild Ao dilatation 4.1cm.

## 2023-05-04 NOTE — HISTORY OF PRESENT ILLNESS
[FreeTextEntry1] : Neymar presented to the office today for a pre operative cardiac evaluation for a right hernia repair to be performed at [facility]  on 5/9/23 by Dr Florentino Clarke.\par This is a 67-year-old male status post bilateral laparoscopic hernia repair in 2011.  The patient complained of a lump in the right groin and presented to Dr Florentino Clarke's office for evaluation.\par He was noted to have a recurrent right inguinal hernia.  Dr. Clarke described the different options that were available to him.  The patient opted for an open repair.\par He presents to our office today for cardiac clearance for an open  right hernia repair.\par He has a  history of lymphoma, PSVT, HTN, and BPH. \par   \par  He presented to [facility] on 12/6/2022 after a 3-minute episode of blurred vision of his left eye.  Hospital work-up from the emergency room including negative CT of the head.  CTA head and neck demonstrated some atherosclerotic disease of his carotids but no obstructive disease.\par He has since followed up with his ophthalmologist, and initially was told that a viscous material was detached in the back of his eye that can happen with aging process.  He has since followed up again and the detached tissue was no longer present.  He was advised by his family to follow-up with his cardiologist.\par \par He has not since developed symptoms.  His vision has been uninterrupted.\par He  denies any dyspnea, PND, orthopnea, lower extremity edema, near syncope, syncope, strokelike symptoms. \par He walks for exercise. \par  He walks about 4 miles a day, and he does yoga as well as lifting weights.\par \par

## 2023-05-04 NOTE — DISCUSSION/SUMMARY
[EKG obtained to assist in diagnosis and management of assessed problem(s)] : EKG obtained to assist in diagnosis and management of assessed problem(s) [FreeTextEntry1] : Neymar is a 67 year man with a history of lymphoma, PSVT, hypertension and BPH as well as bilateral inguinal hernia repairs. He presents today for pre surgical cardiac evaluation for a recurrent right inguinal hernia repair to be performed 5/9/23 by Dr Clarke.\par \par Neymar is doing overall well. Clinically, he is euvolemic on exam. His EKG did not reveal any significant ischemic changes. \par  An echocardiogram performed 12/20/22  to assess for changes in ventricular function and/or cardiac structure revealed a left ventricular ejection fraction estimated to be between 55 to 60%.  There was increased relative wall thickening consistent with left ventricular concentric remodeling.  The right ventricular systolic function was normal.  There was a severely dilated left atrium.  As well as a mildly dilated right atrium.  The proximal ascending aorta is mildly dilated.  There was mild mitral valve regurgitation.  Compared to a prior transthoracic study from 4/1/2021, the left atrium has further dilated.\par  \par He will continue with metoprolol for his palpitations which has controlled his SVT and BP. \par He has discontinued his daily vitamins until after his surgery is performed.\par He is in optimal cardiovascular condition for his planned hernia surgery with no contraindications.  I would proceed with his planned surgery using routine hemodynamic monitoring.  I have asked him to take his metoprolol succinate 50 mg the morning of his surgery with a sip of water.  He will follow-up with Dr. Schumacher on June 2, 2023.  Further recommendations will be based on his clinical course.\par Exercise and diet counseling was performed in order to reduce her future cardiovascular risk. \par

## 2023-05-04 NOTE — PHYSICAL EXAM
[Well Developed] : well developed [Well Nourished] : well nourished [No Acute Distress] : no acute distress [Normal Venous Pressure] : normal venous pressure [No Carotid Bruit] : no carotid bruit [Normal S1, S2] : normal S1, S2 [No Rub] : no rub [Clear Lung Fields] : clear lung fields [Good Air Entry] : good air entry [No Respiratory Distress] : no respiratory distress  [Soft] : abdomen soft [Non Tender] : non-tender [No Masses/organomegaly] : no masses/organomegaly [Normal Bowel Sounds] : normal bowel sounds [Normal Gait] : normal gait [No Edema] : no edema [No Cyanosis] : no cyanosis [No Clubbing] : no clubbing [No Varicosities] : no varicosities [No Rash] : no rash [No Skin Lesions] : no skin lesions [Moves all extremities] : moves all extremities [No Focal Deficits] : no focal deficits [Normal Speech] : normal speech [Alert and Oriented] : alert and oriented [Normal memory] : normal memory [Well Groomed] : well groomed [General Appearance - In No Acute Distress] : no acute distress [Normal Conjunctiva] : the conjunctiva exhibited no abnormalities [Eyelids - No Xanthelasma] : the eyelids demonstrated no xanthelasmas [Normal Oral Mucosa] : normal oral mucosa [No Oral Pallor] : no oral pallor [No Oral Cyanosis] : no oral cyanosis [Normal Jugular Venous A Waves Present] : normal jugular venous A waves present [Normal Jugular Venous V Waves Present] : normal jugular venous V waves present [No Jugular Venous Coffey A Waves] : no jugular venous coffey A waves [Respiration, Rhythm And Depth] : normal respiratory rhythm and effort [Exaggerated Use Of Accessory Muscles For Inspiration] : no accessory muscle use [Auscultation Breath Sounds / Voice Sounds] : lungs were clear to auscultation bilaterally [Abdomen Soft] : soft [Abdomen Tenderness] : non-tender [Abdomen Mass (___ Cm)] : no abdominal mass palpated [Abnormal Walk] : normal gait [Gait - Sufficient For Exercise Testing] : the gait was sufficient for exercise testing [Skin Color & Pigmentation] : normal skin color and pigmentation [] : no rash [No Venous Stasis] : no venous stasis [Skin Lesions] : no skin lesions [No Skin Ulcers] : no skin ulcer [No Xanthoma] : no  xanthoma was observed [Oriented To Time, Place, And Person] : oriented to person, place, and time [Affect] : the affect was normal [Mood] : the mood was normal [No Anxiety] : not feeling anxious [Normal Rate] : normal [Rhythm Regular] : regular [Normal S1] : normal S1 [Normal S2] : normal S2 [No Gallop] : no gallop heard [No Murmur] : no murmurs heard [2+] : left 2+ [No Pitting Edema] : no pitting edema present [General Appearance - Well Developed] : well developed [Normal Appearance] : normal appearance [FreeTextEntry1] : right inguinal hernia [Right Carotid Bruit] : no bruit heard over the right carotid [Left Carotid Bruit] : no bruit heard over the left carotid [Bruit] : no bruit heard

## 2023-05-08 ENCOUNTER — TRANSCRIPTION ENCOUNTER (OUTPATIENT)
Age: 68
End: 2023-05-08

## 2023-05-09 ENCOUNTER — TRANSCRIPTION ENCOUNTER (OUTPATIENT)
Age: 68
End: 2023-05-09

## 2023-05-09 ENCOUNTER — APPOINTMENT (OUTPATIENT)
Dept: SURGERY | Facility: HOSPITAL | Age: 68
End: 2023-05-09

## 2023-05-09 ENCOUNTER — RESULT REVIEW (OUTPATIENT)
Age: 68
End: 2023-05-09

## 2023-05-09 ENCOUNTER — OUTPATIENT (OUTPATIENT)
Dept: OUTPATIENT SERVICES | Facility: HOSPITAL | Age: 68
LOS: 1 days | Discharge: HOME | End: 2023-05-09
Payer: MEDICARE

## 2023-05-09 VITALS
SYSTOLIC BLOOD PRESSURE: 144 MMHG | RESPIRATION RATE: 16 BRPM | OXYGEN SATURATION: 98 % | TEMPERATURE: 98 F | HEIGHT: 70 IN | HEART RATE: 53 BPM | WEIGHT: 189.6 LBS | DIASTOLIC BLOOD PRESSURE: 92 MMHG

## 2023-05-09 VITALS
OXYGEN SATURATION: 95 % | HEART RATE: 71 BPM | RESPIRATION RATE: 15 BRPM | DIASTOLIC BLOOD PRESSURE: 91 MMHG | SYSTOLIC BLOOD PRESSURE: 142 MMHG

## 2023-05-09 DIAGNOSIS — Z98.890 OTHER SPECIFIED POSTPROCEDURAL STATES: Chronic | ICD-10-CM

## 2023-05-09 PROCEDURE — 49520 REREPAIR ING HERNIA REDUCE: CPT | Mod: RT

## 2023-05-09 PROCEDURE — 88304 TISSUE EXAM BY PATHOLOGIST: CPT | Mod: 26

## 2023-05-09 DEVICE — MESH HERNIA PARIETEX PROGRIP 12 X 8CM RIGHT: Type: IMPLANTABLE DEVICE | Status: FUNCTIONAL

## 2023-05-09 RX ORDER — ACETAMINOPHEN 500 MG
975 TABLET ORAL EVERY 6 HOURS
Refills: 0 | Status: DISCONTINUED | OUTPATIENT
Start: 2023-05-09 | End: 2023-05-23

## 2023-05-09 RX ORDER — HYDROMORPHONE HYDROCHLORIDE 2 MG/ML
0.5 INJECTION INTRAMUSCULAR; INTRAVENOUS; SUBCUTANEOUS
Refills: 0 | Status: DISCONTINUED | OUTPATIENT
Start: 2023-05-09 | End: 2023-05-10

## 2023-05-09 RX ORDER — HYDRALAZINE HCL 50 MG
5 TABLET ORAL ONCE
Refills: 0 | Status: DISCONTINUED | OUTPATIENT
Start: 2023-05-09 | End: 2023-05-23

## 2023-05-09 RX ORDER — HYDRALAZINE HCL 50 MG
5 TABLET ORAL ONCE
Refills: 0 | Status: COMPLETED | OUTPATIENT
Start: 2023-05-09 | End: 2023-05-09

## 2023-05-09 RX ORDER — OXYCODONE HYDROCHLORIDE 5 MG/1
1 TABLET ORAL
Qty: 10 | Refills: 0
Start: 2023-05-09

## 2023-05-09 RX ORDER — ONDANSETRON 8 MG/1
4 TABLET, FILM COATED ORAL ONCE
Refills: 0 | Status: DISCONTINUED | OUTPATIENT
Start: 2023-05-09 | End: 2023-05-10

## 2023-05-09 RX ORDER — ACETAMINOPHEN 500 MG
3 TABLET ORAL
Qty: 0 | Refills: 0 | DISCHARGE
Start: 2023-05-09

## 2023-05-09 RX ORDER — METOPROLOL TARTRATE 50 MG
5 TABLET ORAL ONCE
Refills: 0 | Status: DISCONTINUED | OUTPATIENT
Start: 2023-05-09 | End: 2023-05-23

## 2023-05-09 RX ORDER — SODIUM CHLORIDE 9 MG/ML
1000 INJECTION, SOLUTION INTRAVENOUS
Refills: 0 | Status: DISCONTINUED | OUTPATIENT
Start: 2023-05-09 | End: 2023-05-10

## 2023-05-09 RX ADMIN — HYDROMORPHONE HYDROCHLORIDE 0.5 MILLIGRAM(S): 2 INJECTION INTRAMUSCULAR; INTRAVENOUS; SUBCUTANEOUS at 18:21

## 2023-05-09 RX ADMIN — Medication 5 MILLIGRAM(S): at 18:31

## 2023-05-09 RX ADMIN — SODIUM CHLORIDE 100 MILLILITER(S): 9 INJECTION, SOLUTION INTRAVENOUS at 18:11

## 2023-05-09 RX ADMIN — Medication 5 MILLIGRAM(S): at 18:58

## 2023-05-09 RX ADMIN — HYDROMORPHONE HYDROCHLORIDE 0.5 MILLIGRAM(S): 2 INJECTION INTRAMUSCULAR; INTRAVENOUS; SUBCUTANEOUS at 18:06

## 2023-05-09 NOTE — BRIEF OPERATIVE NOTE - NSICDXBRIEFPROCEDURE_GEN_ALL_CORE_FT
PROCEDURES:  Repair, hernia, inguinal, open, using mesh, adult 09-May-2023 17:56:43 right Bhavesh HOWARD

## 2023-05-09 NOTE — ASU DISCHARGE PLAN (ADULT/PEDIATRIC) - CARE PROVIDER_API CALL
Florentino Clarke)  ColonRectal Surgery; Surgery  [address] [place] [address]  Phone: [phone]  Fax: [phone]  Follow Up Time: 2 weeks

## 2023-05-09 NOTE — ASU PATIENT PROFILE, ADULT - FALL HARM RISK - HARM RISK INTERVENTIONS

## 2023-05-11 LAB — SURGICAL PATHOLOGY STUDY: SIGNIFICANT CHANGE UP

## 2023-05-12 DIAGNOSIS — I10 ESSENTIAL (PRIMARY) HYPERTENSION: ICD-10-CM

## 2023-05-12 DIAGNOSIS — F17.210 NICOTINE DEPENDENCE, CIGARETTES, UNCOMPLICATED: ICD-10-CM

## 2023-05-12 DIAGNOSIS — K40.91 UNILATERAL INGUINAL HERNIA, WITHOUT OBSTRUCTION OR GANGRENE, RECURRENT: ICD-10-CM

## 2023-05-12 DIAGNOSIS — C85.90 NON-HODGKIN LYMPHOMA, UNSPECIFIED, UNSPECIFIED SITE: ICD-10-CM

## 2023-05-12 DIAGNOSIS — Z88.0 ALLERGY STATUS TO PENICILLIN: ICD-10-CM

## 2023-05-12 DIAGNOSIS — N40.0 BENIGN PROSTATIC HYPERPLASIA WITHOUT LOWER URINARY TRACT SYMPTOMS: ICD-10-CM

## 2023-05-15 ENCOUNTER — APPOINTMENT (OUTPATIENT)
Dept: SURGERY | Facility: CLINIC | Age: 68
End: 2023-05-15
Payer: MEDICARE

## 2023-05-15 VITALS
HEART RATE: 51 BPM | DIASTOLIC BLOOD PRESSURE: 102 MMHG | WEIGHT: 180 LBS | TEMPERATURE: 97.6 F | HEIGHT: 70 IN | BODY MASS INDEX: 25.77 KG/M2 | SYSTOLIC BLOOD PRESSURE: 163 MMHG

## 2023-05-15 PROCEDURE — 99024 POSTOP FOLLOW-UP VISIT: CPT

## 2023-06-02 ENCOUNTER — NON-APPOINTMENT (OUTPATIENT)
Age: 68
End: 2023-06-02

## 2023-06-02 ENCOUNTER — APPOINTMENT (OUTPATIENT)
Dept: CARDIOLOGY | Facility: CLINIC | Age: 68
End: 2023-06-02
Payer: MEDICARE

## 2023-06-02 VITALS
HEIGHT: 70 IN | BODY MASS INDEX: 26.63 KG/M2 | DIASTOLIC BLOOD PRESSURE: 89 MMHG | OXYGEN SATURATION: 97 % | SYSTOLIC BLOOD PRESSURE: 154 MMHG | HEART RATE: 53 BPM | WEIGHT: 186 LBS

## 2023-06-02 DIAGNOSIS — R07.89 OTHER CHEST PAIN: ICD-10-CM

## 2023-06-02 PROCEDURE — 99214 OFFICE O/P EST MOD 30 MIN: CPT

## 2023-06-02 PROCEDURE — 93000 ELECTROCARDIOGRAM COMPLETE: CPT

## 2023-06-02 NOTE — DISCUSSION/SUMMARY
[FreeTextEntry1] : 67 year man with a history as listed presents for a followup cardiac evaluation. \par Neymar's  Bp has been high. He will continue Toprol 50mg q12. Add valsartan 40mg Qday. \par His chest pain was nonanginal. but he never got his ischemic evaluation. H enever got his nuclear stress. He will get a 640 slice cardiac CT to define his coronary anatomy and to rule out significant CAD.  Clinically he is euvolemic on exam. His EKG did not reveal any significant ischemic changes. \par he will continue with metoprolol   which has controlled his SVT.\par He does not want to get a sleep study.\par Based on his former lipid profile he should get statin therapy. He will fax me his latest labs.  \par Exercise and diet counseling was performed in order to reduce her future cardiovascular risk. \par He will followup with me in 6 months or sooner if necessary.  [EKG obtained to assist in diagnosis and management of assessed problem(s)] : EKG obtained to assist in diagnosis and management of assessed problem(s)

## 2023-06-02 NOTE — HISTORY OF PRESENT ILLNESS
[FreeTextEntry1] : 67 year old man with history of lymphoma, PSVT, HTN, BPH presents for a followup cardiac evaluation. \par \par He had PSVT (palpitations) that started in 2005. He had it rarely until 2016. It has now resolved with Verapmil and lopressor.    \par \par He was last here in 2023. \par Since his last visit, he did not get his sleep study or nuclear stress. \par he has noted to be more hypertensive. His Bp has been ranging higher especially in the morning. He tolerated his hernia surgery but needed additional bp medications. \par He   denies any dyspnea, PND, orthopnea, lower extremity edema, near syncope, syncope, strokelike symptoms. \par He walks for exercise and 4-5 miles a day. \par  \par

## 2023-06-05 ENCOUNTER — APPOINTMENT (OUTPATIENT)
Dept: SURGERY | Facility: CLINIC | Age: 68
End: 2023-06-05
Payer: MEDICARE

## 2023-06-05 VITALS
HEART RATE: 67 BPM | BODY MASS INDEX: 26.48 KG/M2 | HEIGHT: 70 IN | SYSTOLIC BLOOD PRESSURE: 145 MMHG | DIASTOLIC BLOOD PRESSURE: 96 MMHG | TEMPERATURE: 97.6 F | WEIGHT: 185 LBS

## 2023-06-05 PROCEDURE — 99024 POSTOP FOLLOW-UP VISIT: CPT

## 2023-06-17 LAB
ALBUMIN SERPL ELPH-MCNC: 4.3 G/DL
ALP BLD-CCNC: 45 U/L
ALT SERPL-CCNC: 12 U/L
ANION GAP SERPL CALC-SCNC: 9 MMOL/L
AST SERPL-CCNC: 20 U/L
BILIRUB SERPL-MCNC: 0.6 MG/DL
BUN SERPL-MCNC: 15 MG/DL
CALCIUM SERPL-MCNC: 9.4 MG/DL
CHLORIDE SERPL-SCNC: 103 MMOL/L
CO2 SERPL-SCNC: 27 MMOL/L
CREAT SERPL-MCNC: 1.16 MG/DL
EGFR: 68 ML/MIN/1.73M2
GLUCOSE SERPL-MCNC: 100 MG/DL
POTASSIUM SERPL-SCNC: 4.3 MMOL/L
PROT SERPL-MCNC: 7 G/DL
SODIUM SERPL-SCNC: 140 MMOL/L

## 2023-07-05 ENCOUNTER — APPOINTMENT (OUTPATIENT)
Dept: CT IMAGING | Facility: CLINIC | Age: 68
End: 2023-07-05
Payer: MEDICARE

## 2023-07-05 ENCOUNTER — OUTPATIENT (OUTPATIENT)
Dept: OUTPATIENT SERVICES | Facility: HOSPITAL | Age: 68
LOS: 1 days | End: 2023-07-05
Payer: MEDICARE

## 2023-07-05 DIAGNOSIS — R07.89 OTHER CHEST PAIN: ICD-10-CM

## 2023-07-05 DIAGNOSIS — Z98.890 OTHER SPECIFIED POSTPROCEDURAL STATES: Chronic | ICD-10-CM

## 2023-07-05 PROCEDURE — 75574 CT ANGIO HRT W/3D IMAGE: CPT

## 2023-07-05 PROCEDURE — 75574 CT ANGIO HRT W/3D IMAGE: CPT | Mod: 26,MH

## 2023-07-18 ENCOUNTER — TRANSCRIPTION ENCOUNTER (OUTPATIENT)
Age: 68
End: 2023-07-18

## 2023-07-18 ENCOUNTER — OUTPATIENT (OUTPATIENT)
Dept: OUTPATIENT SERVICES | Facility: HOSPITAL | Age: 68
LOS: 1 days | End: 2023-07-18
Payer: MEDICARE

## 2023-07-18 VITALS
HEIGHT: 70 IN | WEIGHT: 182.98 LBS | HEART RATE: 54 BPM | SYSTOLIC BLOOD PRESSURE: 170 MMHG | DIASTOLIC BLOOD PRESSURE: 103 MMHG | OXYGEN SATURATION: 98 % | RESPIRATION RATE: 20 BRPM

## 2023-07-18 VITALS
SYSTOLIC BLOOD PRESSURE: 129 MMHG | HEART RATE: 65 BPM | RESPIRATION RATE: 16 BRPM | OXYGEN SATURATION: 96 % | DIASTOLIC BLOOD PRESSURE: 77 MMHG

## 2023-07-18 DIAGNOSIS — Z98.890 OTHER SPECIFIED POSTPROCEDURAL STATES: Chronic | ICD-10-CM

## 2023-07-18 DIAGNOSIS — R93.1 ABNORMAL FINDINGS ON DIAGNOSTIC IMAGING OF HEART AND CORONARY CIRCULATION: ICD-10-CM

## 2023-07-18 LAB
ANION GAP SERPL CALC-SCNC: 3 MMOL/L — LOW (ref 5–17)
BUN SERPL-MCNC: 14 MG/DL — SIGNIFICANT CHANGE UP (ref 7–23)
CALCIUM SERPL-MCNC: 8.8 MG/DL — SIGNIFICANT CHANGE UP (ref 8.5–10.1)
CHLORIDE SERPL-SCNC: 111 MMOL/L — HIGH (ref 96–108)
CO2 SERPL-SCNC: 27 MMOL/L — SIGNIFICANT CHANGE UP (ref 22–31)
CREAT SERPL-MCNC: 1.1 MG/DL — SIGNIFICANT CHANGE UP (ref 0.5–1.3)
EGFR: 73 ML/MIN/1.73M2 — SIGNIFICANT CHANGE UP
GLUCOSE SERPL-MCNC: 122 MG/DL — HIGH (ref 70–99)
HCT VFR BLD CALC: 43.3 % — SIGNIFICANT CHANGE UP (ref 39–50)
HGB BLD-MCNC: 14.5 G/DL — SIGNIFICANT CHANGE UP (ref 13–17)
MCHC RBC-ENTMCNC: 31.6 PG — SIGNIFICANT CHANGE UP (ref 27–34)
MCHC RBC-ENTMCNC: 33.5 GM/DL — SIGNIFICANT CHANGE UP (ref 32–36)
MCV RBC AUTO: 94.3 FL — SIGNIFICANT CHANGE UP (ref 80–100)
NRBC # BLD: 0 /100 WBCS — SIGNIFICANT CHANGE UP (ref 0–0)
PLATELET # BLD AUTO: 148 K/UL — LOW (ref 150–400)
POTASSIUM SERPL-MCNC: 3.7 MMOL/L — SIGNIFICANT CHANGE UP (ref 3.5–5.3)
POTASSIUM SERPL-SCNC: 3.7 MMOL/L — SIGNIFICANT CHANGE UP (ref 3.5–5.3)
RBC # BLD: 4.59 M/UL — SIGNIFICANT CHANGE UP (ref 4.2–5.8)
RBC # FLD: 13.2 % — SIGNIFICANT CHANGE UP (ref 10.3–14.5)
SODIUM SERPL-SCNC: 141 MMOL/L — SIGNIFICANT CHANGE UP (ref 135–145)
WBC # BLD: 4.21 K/UL — SIGNIFICANT CHANGE UP (ref 3.8–10.5)
WBC # FLD AUTO: 4.21 K/UL — SIGNIFICANT CHANGE UP (ref 3.8–10.5)

## 2023-07-18 PROCEDURE — 80048 BASIC METABOLIC PNL TOTAL CA: CPT

## 2023-07-18 PROCEDURE — C1769: CPT

## 2023-07-18 PROCEDURE — 99152 MOD SED SAME PHYS/QHP 5/>YRS: CPT

## 2023-07-18 PROCEDURE — 93005 ELECTROCARDIOGRAM TRACING: CPT

## 2023-07-18 PROCEDURE — C1887: CPT

## 2023-07-18 PROCEDURE — C1894: CPT

## 2023-07-18 PROCEDURE — 36415 COLL VENOUS BLD VENIPUNCTURE: CPT

## 2023-07-18 PROCEDURE — 85027 COMPLETE CBC AUTOMATED: CPT

## 2023-07-18 PROCEDURE — 93010 ELECTROCARDIOGRAM REPORT: CPT

## 2023-07-18 PROCEDURE — 93458 L HRT ARTERY/VENTRICLE ANGIO: CPT

## 2023-07-18 PROCEDURE — 93458 L HRT ARTERY/VENTRICLE ANGIO: CPT | Mod: 26

## 2023-07-18 RX ORDER — ASPIRIN/CALCIUM CARB/MAGNESIUM 324 MG
1 TABLET ORAL
Qty: 30 | Refills: 3
Start: 2023-07-18 | End: 2023-11-14

## 2023-07-18 RX ORDER — ATORVASTATIN CALCIUM 80 MG/1
40 TABLET, FILM COATED ORAL AT BEDTIME
Refills: 0 | Status: DISCONTINUED | OUTPATIENT
Start: 2023-07-18 | End: 2023-08-01

## 2023-07-18 RX ORDER — ASPIRIN/CALCIUM CARB/MAGNESIUM 324 MG
81 TABLET ORAL DAILY
Refills: 0 | Status: DISCONTINUED | OUTPATIENT
Start: 2023-07-18 | End: 2023-08-01

## 2023-07-18 RX ORDER — SODIUM CHLORIDE 9 MG/ML
1000 INJECTION INTRAMUSCULAR; INTRAVENOUS; SUBCUTANEOUS
Refills: 0 | Status: COMPLETED | OUTPATIENT
Start: 2023-07-18 | End: 2023-07-18

## 2023-07-18 RX ORDER — VALSARTAN 80 MG/1
0 TABLET ORAL
Refills: 0 | DISCHARGE

## 2023-07-18 RX ORDER — ATORVASTATIN CALCIUM 80 MG/1
1 TABLET, FILM COATED ORAL
Qty: 30 | Refills: 3
Start: 2023-07-18 | End: 2023-11-14

## 2023-07-18 RX ADMIN — SODIUM CHLORIDE 250 MILLILITER(S): 9 INJECTION INTRAMUSCULAR; INTRAVENOUS; SUBCUTANEOUS at 10:00

## 2023-07-18 RX ADMIN — Medication 81 MILLIGRAM(S): at 11:49

## 2023-07-18 NOTE — ASU DISCHARGE PLAN (ADULT/PEDIATRIC) - NS MD DC FALL RISK RISK
For information on Fall & Injury Prevention, visit: [email or web address] OR  [email or web address] OR  https://www.cdc.gov/steadi/patient.html

## 2023-07-18 NOTE — DISCHARGE NOTE NURSING/CASE MANAGEMENT/SOCIAL WORK - NSDCPEFALRISK_GEN_ALL_CORE
For information on Fall & Injury Prevention, visit: [email or web address] OR  [email or web address] OR  https://www.cdc.gov/steadi/patient.html

## 2023-07-18 NOTE — ASU DISCHARGE PLAN (ADULT/PEDIATRIC) - COMMENTS
Follow up CT Surgery Consultation will discuss with Dr DOUG Schumacher specific provider/options TBD Follow up CT Surgery Consultation will discuss with Dr DOUG Schumacher specific provider/[id number] [facility] CT Surgery

## 2023-07-18 NOTE — ASU DISCHARGE PLAN (ADULT/PEDIATRIC) - PROVIDER TOKENS
PROVIDER:[TOKEN:[[id number]],FOLLOWUP:[1 week]] PROVIDER:[TOKEN:[[id number]],FOLLOWUP:[1 week]],PROVIDER:[TOKEN:[[id number]],FOLLOWUP:[2 weeks]]

## 2023-07-18 NOTE — H&P CARDIOLOGY - HISTORY OF PRESENT ILLNESS
67 year old man with history of non-hodgkins lymphoma, PSVT, HTN, BPH, recent right inguinal hernia repair in May, presents this am for a [facility] with Dr Jenni Jimenez secondary to a recent abnormal Cardiac CT.    He had PSVT (palpitations) that started in 2005. He had it rarely until 2016. It has now resolved with Verapmil and lopressor.     He was noted to be more hypertensive. His Bp has been ranging higher especially in the morning. He tolerated his hernia surgery but needed additional bp medications.   He denies any dyspnea, PND, orthopnea, lower extremity edema, near syncope, syncope, strokelike symptoms.   He walks for exercise and 4-5 miles a day.        67 year old man with history of non-hodgkins lymphoma, PSVT, HTN, BPH, recent right inguinal hernia repair in May, presents this am for a [facility] with Dr Jenni Jimenez secondary to a recent abnormal Cardiac CTA ( see below)   He denies any c/o CP, SOB or palpitations and is in no distress.  He had PSVT (palpitations) that started in 2005. He had it rarely until 2016. It has now resolved with Verapmil and lopressor.     He was noted to be more hypertensive. His Bp has been ranging higher especially in the morning. He tolerated his hernia surgery but needed additional bp medications.   He denies any dyspnea, PND, orthopnea, lower extremity edema, near syncope, syncope, strokelike symptoms.   He walks for exercise and 4-5 miles a day.     ASA-2  MALL-2  BRA-1.0%  eGFR-73  Creat-1.10    Cardiac Diagnostics-         67 year old man with history of non-hodgkins lymphoma, PSVT, HTN, BPH, recent right inguinal hernia repair in May, presents this am for a [facility] with Dr Jenni Jimenez secondary to a recent abnormal Cardiac CTA ( see below)   He denies any c/o CP, SOB or palpitations and is in no distress.  He had PSVT (palpitations) that started in 2005. He had it rarely until 2016. It has now resolved with Verapmil and lopressor.     He was noted to be more hypertensive. His Bp has been ranging higher especially in the morning. He tolerated his hernia surgery but needed additional bp medications.   He denies any dyspnea, PND, orthopnea, lower extremity edema, near syncope, syncope, strokelike symptoms.   He walks for exercise and 4-5 miles a day.     ASA-2  MALL-2  BRA-1.0%  eGFR-73  Creat-1.10    Cardiac Diagnostics-  TTE 12/22/2022  EF 55-60%   Dilated RA                 EXAM: [id number] - CT ANGIO HEART CORONARY IC  - ORDERED BY: FAROOQ NEWMAN    Calcium score- 2439    PROCEDURE DATE:  07/05/2023    COMPARISON: No prior CT Angio Heart Coronary with IV contrast with Calcium Score for comparison    Stenosis are reported in accordance of JCCT 2009 guidelines:  0- Normal: absence of plaque and no luminal stenosis  1- Minimal: plaque with <25% stenosis  2- Mild: 25-49% stenosis  3- Moderate: 50-69% stenosis  4- Severe: 70-99% stenosis  5- Occluded    FINDINGS:    Calcium Score: the observed Agatston calcium score is  2439  Left main (LM) coronary artery:  0  Left anterior descending (LAD) coronary artery:  741  Left circumflex (LCX) coronary artery:  223  Right coronary artery (RCA):  1475    Coronary Anatomy: There is no evidence of anomalous coronary arteries. There is right coronary artery dominance    LM (Left Main):  Short LM has no plaque with no luminal narrowing.  Bifurc or Trifurc: LM bifurcates into LAD and LCx.    LAD (Left Anterior Descending):  Proximal LAD has  partially calcified plaque with severe luminal narrowing with spotty calcification.  Mid LAD has  partially calcified plaque with mild luminal narrowing, followed by focal calcified plaque with probably severe luminal narrowing; blooming artifact from calcium precludes accurate assessment of luminal narrowing  Distal LAD has  no plaque with no luminal narrowing.  Diagonals (D): Small caliber D1 has  no plaque. D2 has several regions of calcified plaque with probably moderate-severe luminal narrowing. D3-D4 has no plaque.  Distal LAD/apex: Distal LAD tapers towards the apex .    LCx (Left Circumflex):  Proximal LCx has partially calcified plaque with mild luminal narrowing.  Mid-distal LCx has multiple regions of calcified plaque with moderate luminal narrowing and a partially calcified plaque with severe luminal narrowing, with spotty calcification.  Obtuse marginals (OM): OM1 is diminutive.  S shaped SA eduardo artery arises from Lcx.    RCA (Right Coronary Artery):  Proximal RCA has calcified plaque with mild luminal narrowing.  Mid RCA has predominantly calcified plaque with mild luminal narrowing.  Distal RCA has noncalcified plaque with mild luminal narrowing.    PDA and/or PLB: Posterior descending artery (PDA) arises from the RCA and has partially calcified plaque with severe luminal narrowing.  Posterior left ventricular branch (PLB) arises from the RCA and has calcified plaque in the very distal segment with indeterminate luminal narrowing.    AORTA: Borderline dilated aortic root and dilated ascending aorta.  Sinus of Valsalva diameter, Right coronary cusp to commissure (diastole, mm):  38.2  Sinus of Valsalva diameter, Left coronary cusp to commissure (diastole, mm):  39.2  Sinus of Valsalva diameter, Non coronary cusp to commissure  (diastole, mm):  40.3  Sinotubular junction (diastole, mm):  39.2 x 37.9  Ascending aorta (diastole, mm):  43.1 x 42.8  Proximal descending aorta  (diastole, mm):  27.7 x 27.7  Aortic arch: Aortic arch not visualized.  Dissection: No aortic dissection in the visualized images.  Calcified and noncalcified plaque present in visualized segments of the thoracic aorta.    CARDIAC CHAMBERS:  Qualitatively dilated left atrium, consider correlation with echocardiogram as clinically indicated.  Probable patent foramen ovale, consider echocardiogram with bubble study if indicated.  Asymmetric thickening of the basal anteroseptum  15mm, consider additional cardiac imaging (echocardiogram or cardiac MRI) for further evaluation as clinically indicated.    VALVES:  Mitral valve: Normal CT appearance of the mitral valve without calcification.  Aortic valve: Normal CT appearance of the aortic valve in diastole.    PERICARDIUM: Normal pericardial thickness without calcification. No pericardial effusion.    NONCARDIAC FINDINGS:  No hilar and or mediastinal adenopathy is noted. Visualized lungs are clear. Degenerative changes of the spine are noted.    IMPRESSION:  1. Proximal LAD has severe stenosis with high risk features; mid LAD has probable severe stenosis. D2 has probable moderate-severe stenosis. Mid-distal Lcx has severe stenosis with high risk features. R-PDA has severe stenosis. R-PLB has indeterminate stenosis.  Rest of the coronary findings as detailed above.    2. Agatston calcium score of 2439, which is at 96th percentile for individuals of the same age, gender, and race/ethnicity who are free of clinical cardiovascular disease and treated diabetes by the MCKENZIE calculator.    3. Borderline dilated aortic root and dilated ascending aorta (measurements detailed above).    4. Asymmetric thickening of the basal anteroseptum  15mm, consider additional cardiac imaging (echocardiogram or cardiac MRI) for further evaluation as clinically indicated.    5. Probable patent foramen ovale, consider echocardiogram with bubble study if indicated.    6. Qualitatively dilated left atrium, consider correlation with echocardiogram as clinically indicated.    Findings discussed with Dr. Farooq Newman 7/5/2023 3:40PM.    --- End of Report ---                     67 year old man with history of non-hodgkins lymphoma, PSVT, HTN, BPH, recent right inguinal hernia repair in May, presents this am for a C with Dr Jenni Jimenez secondary to a recent abnormal Cardiac CTA ( see below)   He denies any c/o CP, SOB or palpitations and is in no distress.  He had PSVT (palpitations) that started in 2005. He had it rarely until 2016. It has now resolved with Verapmil and lopressor.   Pt referred by Dr Farooq Newman Cardiology  He was noted to be more hypertensive. His Bp has been ranging higher especially in the morning. He tolerated his hernia surgery but needed additional bp medications.   He denies any dyspnea, PND, orthopnea, lower extremity edema, near syncope, syncope, strokelike symptoms.   He walks for exercise and 4-5 miles a day.     ASA-2  MALL-2  BRA-1.0%  eGFR-73  Creat-1.10    Cardiac Diagnostics-  TTE 12/22/2022  EF 55-60%   Increased relative wall thickening c/w LV concentric remodeling  Mildly Dilated RA, severely dilated LA  Mild calcification of mitral valve annulus      EXAM: [id number] - CT ANGIO HEART CORONARY IC  - ORDERED BY: FAROOQ NEWMAN    Calcium score- 2439    PROCEDURE DATE:  07/05/2023    COMPARISON: No prior CT Angio Heart Coronary with IV contrast with Calcium Score for comparison    Stenosis are reported in accordance of JCCT 2009 guidelines:  0- Normal: absence of plaque and no luminal stenosis  1- Minimal: plaque with <25% stenosis  2- Mild: 25-49% stenosis  3- Moderate: 50-69% stenosis  4- Severe: 70-99% stenosis  5- Occluded    FINDINGS:    Calcium Score: the observed Agatston calcium score is  2439  Left main (LM) coronary artery:  0  Left anterior descending (LAD) coronary artery:  741  Left circumflex (LCX) coronary artery:  223  Right coronary artery (RCA):  1475    Coronary Anatomy: There is no evidence of anomalous coronary arteries. There is right coronary artery dominance    LM (Left Main):  Short LM has no plaque with no luminal narrowing.  Bifurc or Trifurc: LM bifurcates into LAD and LCx.    LAD (Left Anterior Descending):  Proximal LAD has  partially calcified plaque with severe luminal narrowing with spotty calcification.  Mid LAD has  partially calcified plaque with mild luminal narrowing, followed by focal calcified plaque with probably severe luminal narrowing; blooming artifact from calcium precludes accurate assessment of luminal narrowing  Distal LAD has  no plaque with no luminal narrowing.  Diagonals (D): Small caliber D1 has  no plaque. D2 has several regions of calcified plaque with probably moderate-severe luminal narrowing. D3-D4 has no plaque.  Distal LAD/apex: Distal LAD tapers towards the apex .    LCx (Left Circumflex):  Proximal LCx has partially calcified plaque with mild luminal narrowing.  Mid-distal LCx has multiple regions of calcified plaque with moderate luminal narrowing and a partially calcified plaque with severe luminal narrowing, with spotty calcification.  Obtuse marginals (OM): OM1 is diminutive.  S shaped SA eduardo artery arises from Lcx.    RCA (Right Coronary Artery):  Proximal RCA has calcified plaque with mild luminal narrowing.  Mid RCA has predominantly calcified plaque with mild luminal narrowing.  Distal RCA has noncalcified plaque with mild luminal narrowing.    PDA and/or PLB: Posterior descending artery (PDA) arises from the RCA and has partially calcified plaque with severe luminal narrowing.  Posterior left ventricular branch (PLB) arises from the RCA and has calcified plaque in the very distal segment with indeterminate luminal narrowing.    AORTA: Borderline dilated aortic root and dilated ascending aorta.  Sinus of Valsalva diameter, Right coronary cusp to commissure (diastole, mm):  38.2  Sinus of Valsalva diameter, Left coronary cusp to commissure (diastole, mm):  39.2  Sinus of Valsalva diameter, Non coronary cusp to commissure  (diastole, mm):  40.3  Sinotubular junction (diastole, mm):  39.2 x 37.9  Ascending aorta (diastole, mm):  43.1 x 42.8  Proximal descending aorta  (diastole, mm):  27.7 x 27.7  Aortic arch: Aortic arch not visualized.  Dissection: No aortic dissection in the visualized images.  Calcified and noncalcified plaque present in visualized segments of the thoracic aorta.    CARDIAC CHAMBERS:  Qualitatively dilated left atrium, consider correlation with echocardiogram as clinically indicated.  Probable patent foramen ovale, consider echocardiogram with bubble study if indicated.  Asymmetric thickening of the basal anteroseptum  15mm, consider additional cardiac imaging (echocardiogram or cardiac MRI) for further evaluation as clinically indicated.    VALVES:  Mitral valve: Normal CT appearance of the mitral valve without calcification.  Aortic valve: Normal CT appearance of the aortic valve in diastole.    PERICARDIUM: Normal pericardial thickness without calcification. No pericardial effusion.    NONCARDIAC FINDINGS:  No hilar and or mediastinal adenopathy is noted. Visualized lungs are clear. Degenerative changes of the spine are noted.    IMPRESSION:  1. Proximal LAD has severe stenosis with high risk features; mid LAD has probable severe stenosis. D2 has probable moderate-severe stenosis. Mid-distal Lcx has severe stenosis with high risk features. R-PDA has severe stenosis. R-PLB has indeterminate stenosis.  Rest of the coronary findings as detailed above.    2. Agatston calcium score of 2439, which is at 96th percentile for individuals of the same age, gender, and race/ethnicity who are free of clinical cardiovascular disease and treated diabetes by the MCKENZIE calculator.    3. Borderline dilated aortic root and dilated ascending aorta (measurements detailed above).    4. Asymmetric thickening of the basal anteroseptum  15mm, consider additional cardiac imaging (echocardiogram or cardiac MRI) for further evaluation as clinically indicated.    5. Probable patent foramen ovale, consider echocardiogram with bubble study if indicated.    6. Qualitatively dilated left atrium, consider correlation with echocardiogram as clinically indicated.    Findings discussed with Dr. Farooq Newman 7/5/2023 3:40PM.    --- End of Report ---

## 2023-07-18 NOTE — PROGRESS NOTE ADULT - SUBJECTIVE AND OBJECTIVE BOX
Post Diagnostic Cardiac Catheterization Chart Note    Pt is s/p Diagnostic [facility] via #6Fr RRA by Dr Jenni Jimenez-refer for CTS consultation/ vs. staged PCIs-proxLAD 80%, dLAD70%, OM-90%, PDA-80%    Patient without complaints. Denies CP, SOB, palpitations, N/V, fever/chills, abd pain, numbness/tingling/weakness, other c/o at this time.    RRA access site stable (clean, dry, intact, without bleeding, heat, erythema, or hematoma). Radial band in place. To be removed at 11:00 am  RUE motor, neuro, circ intact.  Hemodynamically stable, neurologically intact, VS stable, afebrile    A/P: s/p diagnostic [facility]  d/c home once post cath recovery completed / discharge criteria met and wrist / hemodynamics remain stable (with radial band removed).   initiate Baby ASA 81 mg po Daily and statin-lipitor 40 mg po QHS  F/U with Dr Farooq Schumacher within 1 week to discuss options CTS vs. Staged PCI  see discharge for instructions/plan/ education r/t : RRA access site care  Restricted use with no heavy lifting of affected arm for 48 hours.  No submerging the arm in water for 48 hours.  You may start showering today.  Call your doctor for any bleeding, swelling, loss of sensation in the hand or fingers, or fingers turning blue.  If heavy bleeding or large lumps form, hold pressure at the spot and come to the Emergency Room.

## 2023-07-18 NOTE — ASU DISCHARGE PLAN (ADULT/PEDIATRIC) - CARE PROVIDER_API CALL
Farooq Schumacher  Cardiology  [address] [place] [address]  Phone: [phone]  Fax: [phone]  Follow Up Time: 1 week   Farooq Schumacher  Cardiology  [address] [place] [address]  Phone: [phone]  Fax: [phone]  Follow Up Time: 1 week    Herrera Rendon  Thoracic and Cardiac Surgery  [address] [place] [id number]  Phone: [phone]  Fax: [phone]  Follow Up Time: 2 weeks

## 2023-07-18 NOTE — ASU PATIENT PROFILE, ADULT - FALL HARM RISK - UNIVERSAL INTERVENTIONS
Bed in lowest position, wheels locked, appropriate side rails in place/Call bell, personal items and telephone in reach/Instruct patient to call for assistance before getting out of bed or chair/Non-slip footwear when patient is out of bed/[place] to call system/Physically safe environment - no spills, clutter or unnecessary equipment/Purposeful Proactive Rounding/Room/bathroom lighting operational, light cord in reach

## 2023-07-19 PROBLEM — K21.9 LPRD (LARYNGOPHARYNGEAL REFLUX DISEASE): Status: ACTIVE | Noted: 2019-11-26

## 2023-07-20 ENCOUNTER — APPOINTMENT (OUTPATIENT)
Dept: CARDIOTHORACIC SURGERY | Facility: CLINIC | Age: 68
End: 2023-07-20
Payer: MEDICARE

## 2023-07-20 VITALS
HEART RATE: 64 BPM | TEMPERATURE: 97.5 F | SYSTOLIC BLOOD PRESSURE: 148 MMHG | OXYGEN SATURATION: 98 % | HEIGHT: 70 IN | WEIGHT: 183 LBS | DIASTOLIC BLOOD PRESSURE: 93 MMHG | BODY MASS INDEX: 26.2 KG/M2 | RESPIRATION RATE: 16 BRPM

## 2023-07-20 DIAGNOSIS — I11.9 HYPERTENSIVE HEART DISEASE W/OUT HEART FAILURE: ICD-10-CM

## 2023-07-20 DIAGNOSIS — K21.9 GASTRO-ESOPHAGEAL REFLUX DISEASE W/OUT ESOPHAGITIS: ICD-10-CM

## 2023-07-20 DIAGNOSIS — K51.90 ULCERATIVE COLITIS, UNSPECIFIED, W/OUT COMPLICATIONS: ICD-10-CM

## 2023-07-20 PROCEDURE — 99204 OFFICE O/P NEW MOD 45 MIN: CPT

## 2023-07-20 RX ORDER — MULTIVITAMIN
TABLET ORAL DAILY
Refills: 0 | Status: ACTIVE | COMMUNITY

## 2023-07-20 RX ORDER — ASPIRIN ENTERIC COATED TABLETS 81 MG 81 MG/1
81 TABLET, DELAYED RELEASE ORAL DAILY
Qty: 60 | Refills: 0 | Status: ACTIVE | COMMUNITY
Start: 2023-07-20

## 2023-07-20 RX ORDER — UBIDECARENONE/VIT E ACET 100MG-5
CAPSULE ORAL
Refills: 0 | Status: COMPLETED | COMMUNITY
End: 2023-07-20

## 2023-07-20 RX ORDER — COLD-HOT PACK
EACH MISCELLANEOUS
Refills: 0 | Status: COMPLETED | COMMUNITY
End: 2023-07-20

## 2023-07-20 RX ORDER — BACILLUS COAGULANS/INULIN 1B-250 MG
CAPSULE ORAL
Refills: 0 | Status: COMPLETED | COMMUNITY
End: 2023-07-20

## 2023-07-20 RX ORDER — TAMSULOSIN HYDROCHLORIDE 0.4 MG/1
0.4 CAPSULE ORAL
Qty: 90 | Refills: 0 | Status: ACTIVE | COMMUNITY
Start: 2023-07-20

## 2023-07-20 RX ORDER — TAMSULOSIN HYDROCHLORIDE 0.4 MG/1
CAPSULE ORAL
Refills: 0 | Status: COMPLETED | COMMUNITY
End: 2023-07-20

## 2023-07-20 RX ORDER — ASCORBIC ACID 500 MG
TABLET ORAL DAILY
Refills: 0 | Status: ACTIVE | COMMUNITY

## 2023-07-20 NOTE — CONSULT LETTER
[Dear  ___] : Dear  [unfilled], [Courtesy Letter:] : I had the pleasure of seeing your patient, [unfilled], in my office today. [Please see my note below.] : Please see my note below. [Consult Closing:] : Thank you very much for allowing me to participate in the care of this patient.  If you have any questions, please do not hesitate to contact me. [Sincerely,] : Sincerely, [FreeTextEntry2] : Dr.Neeral Schumacher,  [FreeTextEntry3] : Fausto Altamirano MD\par  & \par \par Cardiovascular & Thoracic Surgery\par [facility] \par [address]\par [facility] [id number]\par \par

## 2023-07-20 NOTE — DATA REVIEWED
[FreeTextEntry1] : 7/18/23 Cardiac Catherization revealed Proximal left anterior descending: There is an 85 % stenosis. Mid left anterior descending: There is a 70 % stenosis. First diagonal: Angiography shows mild atherosclerosis. Second diagonal:Angiography shows minor irregularities.First obtuse marginal: There is an 85 % stenosis. Mid right coronary artery: There is a 40 % stenosis. Distal right coronary artery: There is a 40 % stenosis. Right posterior descending artery: There is an 80 % stenosis. First right posterolateral: There is a 100 % stenosis.\par \par \par 7/5/23 CTA heart revealed Proximal LAD has severe stenosis with high risk features; mid LAD has probable severe stenosis. D2 has probable moderate-severe stenosis. Mid-distal Lcx has severe stenosis with high risk features. R-PDA has severe stenosis. R-PLB has indeterminate stenosis.\par Rest of the coronary findings as detailed above.\par 2. Agatston calcium score of 2439, which is at 96th percentile for individuals of the same age, gender, and race/ethnicity who are free of clinical cardiovascular disease and treated diabetes by the MCKENZIE calculator.\par 3. Borderline dilated aortic root and dilated ascending aorta \par Sinus of Valsalva diameter, Right coronary cusp to commissure (diastole, mm):  38.2\par Sinus of Valsalva diameter, Left coronary cusp to commissure (diastole, mm):  39.2\par Sinus of Valsalva diameter, Non coronary cusp to commissure  (diastole, mm):  40.3\par Sinotubular junction (diastole, mm):  39.2 x 37.9\par Ascending aorta (diastole, mm):  43.1 x 42.8\par Proximal descending aorta  (diastole, mm):  27.7 x 27.7\par \par 4. Asymmetric thickening of the basal anteroseptum 15mm, consider additional cardiac imaging (echocardiogram or cardiac MRI) for further evaluation as clinically indicated.\par 5. Probable patent foramen ovale, consider echocardiogram with bubble study if indicated.\par 6. Qualitatively dilated left atrium, consider correlation with echocardiogram as clinically indicated.\par \par 6/2/23 TTE revealed  Left ventricular ejection fraction is visually estimated at 55 to 60%.\par 2. Increased relative wall thickening consistent with left ventricular concentric remodeling.\par 3. Right ventricular systolic function is normal.\par 4. Severely dilated left atrium.\par 5. Mildly dilated right atrium.\par 6. Aortic root at sinuses of Valsalva is mildly dilated.\par 7. The proximal ascending aorta is mildly dilated.\par 8. Trileaflet aortic valve with normal systolic excursion.\par 9. Trace aortic regurgitation.\par 10. There is mild calcification of the mitral valve annulus.\par 11. Structurally normal mitral valve with normal leaflet excursion.\par 12. Mild mitral valve regurgitation.\par 13. Mild (Grade 1) left ventricular diastolic dysfunction.\par 14. Compared to a prior transthoracic study from 4/1/2021, the LA has further dilated.\par

## 2023-07-20 NOTE — PHYSICAL EXAM
[General Appearance - Alert] : alert [General Appearance - In No Acute Distress] : in no acute distress [General Appearance - Well Nourished] : well nourished [General Appearance - Well Developed] : well developed [Sclera] : the sclera and conjunctiva were normal [PERRL With Normal Accommodation] : pupils were equal in size, round, and reactive to light [Outer Ear] : the ears and nose were normal in appearance [Both Tympanic Membranes Were Examined] : both tympanic membranes were normal [Neck Appearance] : the appearance of the neck was normal [] : no respiratory distress [Respiration, Rhythm And Depth] : normal respiratory rhythm and effort [Auscultation Breath Sounds / Voice Sounds] : lungs were clear to auscultation bilaterally [Apical Impulse] : the apical impulse was normal [Heart Rate And Rhythm] : heart rate was normal and rhythm regular [Heart Sounds] : normal S1 and S2 [Murmurs] : no murmurs [Examination Of The Chest] : the chest was normal in appearance [2+] : left 2+ [Breast Appearance] : normal in appearance [Bowel Sounds] : normal bowel sounds [Abdomen Soft] : soft [No CVA Tenderness] : no ~M costovertebral angle tenderness [Involuntary Movements] : no involuntary movements were seen [Skin Color & Pigmentation] : normal skin color and pigmentation [Skin Turgor] : normal skin turgor [No Focal Deficits] : no focal deficits [Oriented To Time, Place, And Person] : oriented to person, place, and time [Impaired Insight] : insight and judgment were intact [Affect] : the affect was normal [Mood] : the mood was normal [Memory Recent] : recent memory was not impaired [Memory Remote] : remote memory was not impaired [FreeTextEntry1] : Deferred

## 2023-07-20 NOTE — HISTORY OF PRESENT ILLNESS
[FreeTextEntry1] : Mr. LUI is a 67 year old male with  past medical history of Hypertension, BPH, Hyperlipidemia, PSVT, Non Hodgkins lymphoma in 2010 ( S/P lymph node removal, Radiation, f/b Dr. Andres Newsome ),  Ulcerative colitis , S/P Recurrent RT inguinal hernia repair on 5/9/23 with Dr.Douglas Clarke and CAD. He went for  CTA Heart as his bp was high which showed obstructive disease. He subsequently underwent cardiac catheterization on revealed Proximal left anterior descending: There is an 85 % stenosis. Mid left anterior descending: There is a 70 % stenosis. First diagonal: Angiography shows mild atherosclerosis. Second diagonal:Angiography shows minor irregularities.First obtuse marginal: There is an 85 % stenosis. Mid right coronary artery: There is a 40 % stenosis. Distal right coronary artery: There is a 40 % stenosis. Right posterior descending artery: There is an 80 % stenosis. First right posterolateral: There is a 100 % stenosis. He is currently asymptomatic and very physically active. He is referred by Dr. Farooq Schumacher for initial evaluation and management for CAD. Denies any chest pain, shortness of breath, palpitations, dizziness or pedal edema. \par \par \par \par \par NYHA class I\par \par \par

## 2023-07-20 NOTE — ASSESSMENT
[FreeTextEntry1] : Mr. LUI is a 67 year old male with  past medical history of Hypertension, BPH, Hyperlipidemia, PSVT, Non Hodgkins lymphoma in 2010 ( S/P lymph node removal, Radiation, f/b Dr. Andres Newsome ),  Ulcerative colitis , S/P Recurrent RT inguinal hernia repair on 5/9/23 with Dr.Douglas Clarke and CAD. He went for  CTA Heart as his bp was high which showed obstructive disease. He subsequently underwent cardiac catheterization on revealed Proximal left anterior descending: There is an 85 % stenosis. Mid left anterior descending: There is a 70 % stenosis. First diagonal: Angiography shows mild atherosclerosis. Second diagonal:Angiography shows minor irregularities.First obtuse marginal: There is an 85 % stenosis. Mid right coronary artery: There is a 40 % stenosis. Distal right coronary artery: There is a 40 % stenosis. Right posterior descending artery: There is an 80 % stenosis. First right posterolateral: There is a 100 % stenosis. He is currently asymptomatic and very physically active. He is referred by Dr. Farooq Schumacher for initial evaluation and management for CAD. Denies any chest pain, shortness of breath, palpitations, dizziness or pedal edema. \par \par \par \par Dr.Alan Altamirano reviewed the cardiac imaging, medical records and reports with patient and discussed the case. 7/18/23 Cardiac Catherization revealed Proximal left anterior descending: There is an 85 % stenosis. Mid left anterior descending: There is a 70 % stenosis. First diagonal: Angiography shows mild atherosclerosis. Second diagonal:Angiography shows minor irregularities.First obtuse marginal: There is an 85 % stenosis. Mid right coronary artery: There is a 40 % stenosis. Distal right coronary artery: There is a 40 % stenosis. Right posterior descending artery: There is an 80 % stenosis. First right posterolateral: There is a 100 % stenosis.\par \par \par 7/5/23 CTA heart revealed Proximal LAD has severe stenosis with high risk features; mid LAD has probable severe stenosis. D2 has probable moderate-severe stenosis. Mid-distal Lcx has severe stenosis with high risk features. R-PDA has severe stenosis. R-PLB has indeterminate stenosis.\par Rest of the coronary findings as detailed above.\par 2. Agatston calcium score of 2439, which is at 96th percentile for individuals of the same age, gender, and race/ethnicity who are free of clinical cardiovascular disease and treated diabetes by the MCKENZIE calculator.\par 3. Borderline dilated aortic root and dilated ascending aorta \par Sinus of Valsalva diameter, Right coronary cusp to commissure (diastole, mm):  38.2\par Sinus of Valsalva diameter, Left coronary cusp to commissure (diastole, mm):  39.2\par Sinus of Valsalva diameter, Non coronary cusp to commissure  (diastole, mm):  40.3\par Sinotubular junction (diastole, mm):  39.2 x 37.9\par Ascending aorta (diastole, mm):  43.1 x 42.8\par Proximal descending aorta  (diastole, mm):  27.7 x 27.7\par 4. Asymmetric thickening of the basal anteroseptum 15mm, consider additional cardiac imaging (echocardiogram or cardiac MRI) for further evaluation as clinically indicated.\par 5. Probable patent foramen ovale, consider echocardiogram with bubble study if indicated.\par 6. Qualitatively dilated left atrium, consider correlation with echocardiogram as clinically indicated.\par \par 6/2/23 TTE revealed  Left ventricular ejection fraction is visually estimated at 55 to 60%.\par 2. Increased relative wall thickening consistent with left ventricular concentric remodeling.\par 3. Right ventricular systolic function is normal.\par 4. Severely dilated left atrium.\par 5. Mildly dilated right atrium.\par 6. Aortic root at sinuses of Valsalva is mildly dilated.\par 7. The proximal ascending aorta is mildly dilated.\par 8. Trileaflet aortic valve with normal systolic excursion.\par 9. Trace aortic regurgitation.\par 10. There is mild calcification of the mitral valve annulus.\par 11. Structurally normal mitral valve with normal leaflet excursion.\par 12. Mild mitral valve regurgitation.\par 13. Mild (Grade 1) left ventricular diastolic dysfunction.\par 14. Compared to a prior transthoracic study from 4/1/2021, the LA has further dilated.\par \par  Dr.Alan Altamirano discussed the risks , benefits and alternatives to surgery. Risks included but not limited to  bleeding , stroke, Myocardial Infarction, kidney problems,Blood transfusion ,permanent  pacemaker implantation,  infections and death. Dr.Alan Altamirano  quoted a (low) operative mortality and complication risks. Dr.Alan Altamirano also discussed the various approaches in detail.Dr.Alan Altamirano feel that the patient will benefit and is a candidate for a Coronary Artery Bypass grafting . All questions and concerns were addressed and patient agrees to proceed with  surgery. \par \par \par Plan:\par 1) Coronary Artery Bypass grafting \par 2) PST\par 3) Continue Aspirin \par 4) May return to clinic on PRN basis \par

## 2023-07-20 NOTE — END OF VISIT
[FreeTextEntry3] : \par I, GILLIAN Carreon , personally performed the evaluation and management (E/M) services for this new patient. That E/M includes conducting the initial examination, assessing all conditions, and establishing the plan of care. Today, CHANI CONTRERAS  was here to observe my evaluation and management services for this patient. \par \par

## 2023-07-24 ENCOUNTER — APPOINTMENT (OUTPATIENT)
Dept: ULTRASOUND IMAGING | Facility: HOSPITAL | Age: 68
End: 2023-07-24

## 2023-07-24 ENCOUNTER — OUTPATIENT (OUTPATIENT)
Dept: OUTPATIENT SERVICES | Facility: HOSPITAL | Age: 68
LOS: 1 days | End: 2023-07-24
Payer: MEDICARE

## 2023-07-24 VITALS
SYSTOLIC BLOOD PRESSURE: 143 MMHG | HEART RATE: 65 BPM | WEIGHT: 182.1 LBS | HEIGHT: 70 IN | TEMPERATURE: 98 F | RESPIRATION RATE: 16 BRPM | OXYGEN SATURATION: 97 % | DIASTOLIC BLOOD PRESSURE: 93 MMHG

## 2023-07-24 DIAGNOSIS — Z01.818 ENCOUNTER FOR OTHER PREPROCEDURAL EXAMINATION: ICD-10-CM

## 2023-07-24 DIAGNOSIS — I10 ESSENTIAL (PRIMARY) HYPERTENSION: ICD-10-CM

## 2023-07-24 DIAGNOSIS — I25.10 ATHEROSCLEROTIC HEART DISEASE OF NATIVE CORONARY ARTERY WITHOUT ANGINA PECTORIS: ICD-10-CM

## 2023-07-24 DIAGNOSIS — Z29.9 ENCOUNTER FOR PROPHYLACTIC MEASURES, UNSPECIFIED: ICD-10-CM

## 2023-07-24 DIAGNOSIS — Z98.890 OTHER SPECIFIED POSTPROCEDURAL STATES: Chronic | ICD-10-CM

## 2023-07-24 LAB
A1C WITH ESTIMATED AVERAGE GLUCOSE RESULT: 5.3 % — SIGNIFICANT CHANGE UP (ref 4–5.6)
ANION GAP SERPL CALC-SCNC: 11 MMOL/L — SIGNIFICANT CHANGE UP (ref 5–17)
BLD GP AB SCN SERPL QL: NEGATIVE — SIGNIFICANT CHANGE UP
BUN SERPL-MCNC: 22 MG/DL — SIGNIFICANT CHANGE UP (ref 7–23)
CALCIUM SERPL-MCNC: 9 MG/DL — SIGNIFICANT CHANGE UP (ref 8.4–10.5)
CHLORIDE SERPL-SCNC: 104 MMOL/L — SIGNIFICANT CHANGE UP (ref 96–108)
CO2 SERPL-SCNC: 24 MMOL/L — SIGNIFICANT CHANGE UP (ref 22–31)
CREAT SERPL-MCNC: 1.22 MG/DL — SIGNIFICANT CHANGE UP (ref 0.5–1.3)
EGFR: 64 ML/MIN/1.73M2 — SIGNIFICANT CHANGE UP
ESTIMATED AVERAGE GLUCOSE: 105 MG/DL — SIGNIFICANT CHANGE UP (ref 68–114)
GLUCOSE SERPL-MCNC: 97 MG/DL — SIGNIFICANT CHANGE UP (ref 70–99)
HCT VFR BLD CALC: 43.9 % — SIGNIFICANT CHANGE UP (ref 39–50)
HGB BLD-MCNC: 14.9 G/DL — SIGNIFICANT CHANGE UP (ref 13–17)
MCHC RBC-ENTMCNC: 31.6 PG — SIGNIFICANT CHANGE UP (ref 27–34)
MCHC RBC-ENTMCNC: 33.9 GM/DL — SIGNIFICANT CHANGE UP (ref 32–36)
MCV RBC AUTO: 93 FL — SIGNIFICANT CHANGE UP (ref 80–100)
NRBC # BLD: 0 /100 WBCS — SIGNIFICANT CHANGE UP (ref 0–0)
PLATELET # BLD AUTO: 160 K/UL — SIGNIFICANT CHANGE UP (ref 150–400)
POTASSIUM SERPL-MCNC: 4.2 MMOL/L — SIGNIFICANT CHANGE UP (ref 3.5–5.3)
POTASSIUM SERPL-SCNC: 4.2 MMOL/L — SIGNIFICANT CHANGE UP (ref 3.5–5.3)
RBC # BLD: 4.72 M/UL — SIGNIFICANT CHANGE UP (ref 4.2–5.8)
RBC # FLD: 12.9 % — SIGNIFICANT CHANGE UP (ref 10.3–14.5)
RH IG SCN BLD-IMP: POSITIVE — SIGNIFICANT CHANGE UP
SODIUM SERPL-SCNC: 139 MMOL/L — SIGNIFICANT CHANGE UP (ref 135–145)
WBC # BLD: 4.72 K/UL — SIGNIFICANT CHANGE UP (ref 3.8–10.5)
WBC # FLD AUTO: 4.72 K/UL — SIGNIFICANT CHANGE UP (ref 3.8–10.5)

## 2023-07-24 PROCEDURE — 93880 EXTRACRANIAL BILAT STUDY: CPT

## 2023-07-24 PROCEDURE — 86901 BLOOD TYPING SEROLOGIC RH(D): CPT

## 2023-07-24 PROCEDURE — 71046 X-RAY EXAM CHEST 2 VIEWS: CPT | Mod: 26

## 2023-07-24 PROCEDURE — 93880 EXTRACRANIAL BILAT STUDY: CPT | Mod: 26

## 2023-07-24 PROCEDURE — G0463: CPT

## 2023-07-24 PROCEDURE — 83036 HEMOGLOBIN GLYCOSYLATED A1C: CPT

## 2023-07-24 PROCEDURE — 87640 STAPH A DNA AMP PROBE: CPT

## 2023-07-24 PROCEDURE — 87641 MR-STAPH DNA AMP PROBE: CPT

## 2023-07-24 PROCEDURE — 86900 BLOOD TYPING SEROLOGIC ABO: CPT

## 2023-07-24 PROCEDURE — 71046 X-RAY EXAM CHEST 2 VIEWS: CPT

## 2023-07-24 PROCEDURE — 85027 COMPLETE CBC AUTOMATED: CPT

## 2023-07-24 PROCEDURE — 80048 BASIC METABOLIC PNL TOTAL CA: CPT

## 2023-07-24 PROCEDURE — 86850 RBC ANTIBODY SCREEN: CPT

## 2023-07-24 RX ORDER — LIDOCAINE HCL 20 MG/ML
0.2 VIAL (ML) INJECTION ONCE
Refills: 0 | Status: COMPLETED | OUTPATIENT
Start: 2023-08-07 | End: 2023-08-07

## 2023-07-24 RX ORDER — VANCOMYCIN HCL 1 G
1250 VIAL (EA) INTRAVENOUS ONCE
Refills: 0 | Status: COMPLETED | OUTPATIENT
Start: 2023-08-07 | End: 2023-08-07

## 2023-07-24 RX ORDER — CHLORHEXIDINE GLUCONATE 213 G/1000ML
1 SOLUTION TOPICAL ONCE
Refills: 0 | Status: COMPLETED | OUTPATIENT
Start: 2023-08-07 | End: 2023-08-07

## 2023-07-24 NOTE — H&P PST ADULT - PROBLEM SELECTOR PLAN 3
The Caprini score indicates this patient is at risk for a VTE event (score 3-5).  Most surgical patients in this group would benefit from pharmacologic prophylaxis.  The surgical team will determine the balance between VTE risk and bleeding risk The Caprini score indicates that this patient is at high risk for a VTE event (score 6 or greater). Surgical patients in this group will benefit from both pharmacologic prophylaxis and intermittent compression devices.  The surgical team will determine the balance between VTE risk and bleeding risk, and other clinical considerations

## 2023-07-24 NOTE — H&P PST ADULT - NSICDXPASTMEDICALHX_GEN_ALL_CORE_FT
PAST MEDICAL HISTORY:  Hypertension     Non-Hodgkin lymphoma Treated with Chemotherapy and Radiation Therapy    Supraventricular Tachycardia     Ulcerative colitis      PAST MEDICAL HISTORY:  History of BPH     Hyperlipidemia     Hypertension     Non-Hodgkin lymphoma Treated with Chemotherapy and Radiation Therapy    Supraventricular Tachycardia     Ulcerative colitis

## 2023-07-24 NOTE — H&P PST ADULT - ASSESSMENT
Activity  Walks 4 miles daily  8.33 DASI    Airway  Mallmpati II  Denies dentures or loose teeth    CAPRINI VTE 2.0 SCORE [CLOT updated 2019]    AGE RELATED RISK FACTORS                                                       MOBILITY RELATED FACTORS  [ ] Age 41-60 years                                            (1 Point)                    [ ] Bed rest                                                        (1 Point)  [ X] Age: 61-74 years                                           (2 Points)                  [ ] Plaster cast                                                   (2 Points)  [ ] Age= 75 years                                              (3 Points)                    [ ] Bed bound for more than 72 hours                 (2 Points)    DISEASE RELATED RISK FACTORS                                               GENDER SPECIFIC FACTORS  [ ] Edema in the lower extremities                       (1 Point)              [ ] Pregnancy                                                     (1 Point)  [ ] Varicose veins                                               (1 Point)                     [ ] Post-partum < 6 weeks                                   (1 Point)             [ ] BMI > 25 Kg/m2                                            (1 Point)                     [ ] Hormonal therapy  or oral contraception          (1 Point)                 [ ] Sepsis (in the previous month)                        (1 Point)               [ ] History of pregnancy complications                 (1 point)  [ ] Pneumonia or serious lung disease                                               [ ] Unexplained or recurrent                     (1 Point)           (in the previous month)                               (1 Point)  [ ] Abnormal pulmonary function test                     (1 Point)                 SURGERY RELATED RISK FACTORS  [ ] Acute myocardial infarction                              (1 Point)               [ ]  Section                                             (1 Point)  [ ] Congestive heart failure (in the previous month)  (1 Point)      [ ] Minor surgery                                                  (1 Point)   [ ] Inflammatory bowel disease                             (1 Point)               [ ] Arthroscopic surgery                                        (2 Points)  [ ] Central venous access                                      (2 Points)                [X ] General surgery lasting more than 45 minutes (2 points)  [ ] Malignancy- Present or previous                   (2 Points)                [ ] Elective arthroplasty                                         (5 points)    [ ] Stroke (in the previous month)                          (5 Points)                                                                                                                                                           HEMATOLOGY RELATED FACTORS                                                 TRAUMA RELATED RISK FACTORS  [ ] Prior episodes of VTE                                     (3 Points)                [ ] Fracture of the hip, pelvis, or leg                       (5 Points)  [ ] Positive family history for VTE                         (3 Points)             [ ] Acute spinal cord injury (in the previous month)  (5 Points)  [ ] Prothrombin 99356 A                                     (3 Points)               [ ] Paralysis  (less than 1 month)                             (5 Points)  [ ] Factor V Leiden                                             (3 Points)                  [ ] Multiple Trauma within 1 month                        (5 Points)  [ ] Lupus anticoagulants                                     (3 Points)                                                           [ ] Anticardiolipin antibodies                               (3 Points)                                                       [ ] High homocysteine in the blood                      (3 Points)                                             [ ] Other congenital or acquired thrombophilia      (3 Points)                                                [ ] Heparin induced thrombocytopenia                  (3 Points)                                     Total Score [    4      ] Activity  Walks 4 miles daily  8.33 DASI    Airway  Mallmpati II  Denies dentures or loose teeth    CAPRINI VTE 2.0 SCORE [CLOT updated 2019]    AGE RELATED RISK FACTORS                                                       MOBILITY RELATED FACTORS  [ ] Age 41-60 years                                            (1 Point)                    [ ] Bed rest                                                        (1 Point)  [ X] Age: 61-74 years                                           (2 Points)                  [ ] Plaster cast                                                   (2 Points)  [ ] Age= 75 years                                              (3 Points)                    [ ] Bed bound for more than 72 hours                 (2 Points)    DISEASE RELATED RISK FACTORS                                               GENDER SPECIFIC FACTORS  [ ] Edema in the lower extremities                       (1 Point)              [ ] Pregnancy                                                     (1 Point)  [ ] Varicose veins                                               (1 Point)                     [ ] Post-partum < 6 weeks                                   (1 Point)             X[ ] BMI > 25 Kg/m2                                            (1 Point)                     [ ] Hormonal therapy  or oral contraception          (1 Point)                 [ ] Sepsis (in the previous month)                        (1 Point)               [ ] History of pregnancy complications                 (1 point)  [ ] Pneumonia or serious lung disease                                               [ ] Unexplained or recurrent                     (1 Point)           (in the previous month)                               (1 Point)  [ ] Abnormal pulmonary function test                     (1 Point)                 SURGERY RELATED RISK FACTORS  [ ] Acute myocardial infarction                              (1 Point)               [ ]  Section                                             (1 Point)  [ ] Congestive heart failure (in the previous month)  (1 Point)      [ ] Minor surgery                                                  (1 Point)   [ ] Inflammatory bowel disease                             (1 Point)               [ ] Arthroscopic surgery                                        (2 Points)  [ ] Central venous access                                      (2 Points)                [X ] General surgery lasting more than 45 minutes (2 points)  [X ] Malignancy- Present or previous                   (2 Points)                [ ] Elective arthroplasty                                         (5 points)    [ ] Stroke (in the previous month)                          (5 Points)                                                                                                                                                           HEMATOLOGY RELATED FACTORS                                                 TRAUMA RELATED RISK FACTORS  [ ] Prior episodes of VTE                                     (3 Points)                [ ] Fracture of the hip, pelvis, or leg                       (5 Points)  [ ] Positive family history for VTE                         (3 Points)             [ ] Acute spinal cord injury (in the previous month)  (5 Points)  [ ] Prothrombin 74173 A                                     (3 Points)               [ ] Paralysis  (less than 1 month)                             (5 Points)  [ ] Factor V Leiden                                             (3 Points)                  [ ] Multiple Trauma within 1 month                        (5 Points)  [ ] Lupus anticoagulants                                     (3 Points)                                                           [ ] Anticardiolipin antibodies                               (3 Points)                                                       [ ] High homocysteine in the blood                      (3 Points)                                             [ ] Other congenital or acquired thrombophilia      (3 Points)                                                [ ] Heparin induced thrombocytopenia                  (3 Points)                                     Total Score [ 7    ]

## 2023-07-24 NOTE — H&P PST ADULT - PROBLEM SELECTOR PLAN 2
Pt. instructed to take his scheduled dose of valsartan night before procedure and metoprolol morning of procedure

## 2023-07-24 NOTE — H&P PST ADULT - NSHP PST SURGERY DATE_DT_GEN_A_CORE
Pt seen and evaluated in PACU due to high EBL of 2600 during repeat c section c/w focal accreta.  Pt received pit 5 IV, methergine x1, hemabate x2, cytotec DC, and TXA intraoperatively.  Pt feeling well in PACU.  Denies weakness, dizziness, or lightheadedness.      Vital Signs Last 24 Hours  T(C): 36.5 (20 @ 13:00), Max: 36.5 (20 @ 13:00)  HR: 92 (20 @ 17:30) (82 - 96)  BP: 108/52 (20 @ 17:15) (93/50 - 118/49)  RR: 21 (20 @ 17:30) (18 - 36)  SpO2: 94% (20 @ 17:30) (93% - 100%)    Gen: NAD  Abd: firm uterus  ; mod bleeding               9.9<L>  11.08<H> )-----------( 166      (  @ 13:52 )             30.6<L>               11.4<L>  6.61  )-----------( 168      (  @ 08:59 )             34.4<L>    A/P: 31 yo  POD#0 s/p repeat section c/w focal accreta w/ high EBL.  Pt is stable currently  -repeat stat CBC, coags  -monitor vital signs, urine output  -continued evaluation in PACU    d/w Dr. Carlita Schilling PGY2 07-Aug-2023

## 2023-07-24 NOTE — H&P PST ADULT - HISTORY OF PRESENT ILLNESS
67 year old male presents for CABG X 4. Pt. with a history of HTN and reports in lieu of stress test, his cardiologist recommended CT angio which revealed CAD, followed by cardiac cath which revealed significant CAD and CABG recommended. Pt. very active, walks 4 miles daily, denies chest pain, denies palpitations denies dizziness and denies dyspnea on exertion.    Denies history of COVID infection. 67 year old male presents for CABG X 4. Pt. with a history of HTN s/p CT angio which revealed CAD, followed by cardiac cath which revealed significant CAD and CABG recommended. Pt. very active, walks 4 miles daily, denies chest pain, denies palpitations denies dizziness and denies dyspnea on exertion.    Denies history of COVID infection.

## 2023-07-25 LAB
MRSA PCR RESULT.: SIGNIFICANT CHANGE UP
S AUREUS DNA NOSE QL NAA+PROBE: SIGNIFICANT CHANGE UP

## 2023-08-04 PROBLEM — Z87.438 PERSONAL HISTORY OF OTHER DISEASES OF MALE GENITAL ORGANS: Chronic | Status: ACTIVE | Noted: 2023-07-24

## 2023-08-04 PROBLEM — E78.5 HYPERLIPIDEMIA, UNSPECIFIED: Chronic | Status: ACTIVE | Noted: 2023-07-24

## 2023-08-06 ENCOUNTER — TRANSCRIPTION ENCOUNTER (OUTPATIENT)
Age: 68
End: 2023-08-06

## 2023-08-07 ENCOUNTER — INPATIENT (INPATIENT)
Facility: HOSPITAL | Age: 68
LOS: 7 days | Discharge: HOME HEALTH | DRG: 236 | End: 2023-08-15
Attending: THORACIC SURGERY (CARDIOTHORACIC VASCULAR SURGERY) | Admitting: THORACIC SURGERY (CARDIOTHORACIC VASCULAR SURGERY)
Payer: MEDICARE

## 2023-08-07 ENCOUNTER — APPOINTMENT (OUTPATIENT)
Dept: CARDIOTHORACIC SURGERY | Facility: HOSPITAL | Age: 68
End: 2023-08-07

## 2023-08-07 VITALS
WEIGHT: 186.29 LBS | HEART RATE: 57 BPM | SYSTOLIC BLOOD PRESSURE: 147 MMHG | DIASTOLIC BLOOD PRESSURE: 98 MMHG | RESPIRATION RATE: 16 BRPM | TEMPERATURE: 98 F | OXYGEN SATURATION: 97 % | HEIGHT: 70 IN

## 2023-08-07 DIAGNOSIS — I25.10 ATHEROSCLEROTIC HEART DISEASE OF NATIVE CORONARY ARTERY WITHOUT ANGINA PECTORIS: ICD-10-CM

## 2023-08-07 DIAGNOSIS — Z98.890 OTHER SPECIFIED POSTPROCEDURAL STATES: Chronic | ICD-10-CM

## 2023-08-07 LAB
ALBUMIN SERPL ELPH-MCNC: 3.3 G/DL — SIGNIFICANT CHANGE UP (ref 3.3–5)
ALP SERPL-CCNC: 30 U/L — LOW (ref 40–120)
ALT FLD-CCNC: 12 U/L — SIGNIFICANT CHANGE UP (ref 10–45)
ANION GAP SERPL CALC-SCNC: 14 MMOL/L — SIGNIFICANT CHANGE UP (ref 5–17)
APTT BLD: 29 SEC — SIGNIFICANT CHANGE UP (ref 24.5–35.6)
AST SERPL-CCNC: 26 U/L — SIGNIFICANT CHANGE UP (ref 10–40)
BASE EXCESS BLDV CALC-SCNC: -0.9 MMOL/L — SIGNIFICANT CHANGE UP (ref -2–3)
BASE EXCESS BLDV CALC-SCNC: -4.6 MMOL/L — LOW (ref -2–3)
BASE EXCESS BLDV CALC-SCNC: 0.1 MMOL/L — SIGNIFICANT CHANGE UP (ref -2–3)
BASE EXCESS BLDV CALC-SCNC: 0.2 MMOL/L — SIGNIFICANT CHANGE UP (ref -2–3)
BASE EXCESS BLDV CALC-SCNC: 1.2 MMOL/L — SIGNIFICANT CHANGE UP (ref -2–3)
BASE EXCESS BLDV CALC-SCNC: 2.8 MMOL/L — SIGNIFICANT CHANGE UP (ref -2–3)
BASOPHILS # BLD AUTO: 0.03 K/UL — SIGNIFICANT CHANGE UP (ref 0–0.2)
BASOPHILS NFR BLD AUTO: 1 % — SIGNIFICANT CHANGE UP (ref 0–2)
BILIRUB SERPL-MCNC: 0.7 MG/DL — SIGNIFICANT CHANGE UP (ref 0.2–1.2)
BUN SERPL-MCNC: 12 MG/DL — SIGNIFICANT CHANGE UP (ref 7–23)
CA-I SERPL-SCNC: 0.87 MMOL/L — LOW (ref 1.15–1.33)
CA-I SERPL-SCNC: 0.9 MMOL/L — LOW (ref 1.15–1.33)
CA-I SERPL-SCNC: 0.98 MMOL/L — LOW (ref 1.15–1.33)
CA-I SERPL-SCNC: 0.98 MMOL/L — LOW (ref 1.15–1.33)
CA-I SERPL-SCNC: 1.12 MMOL/L — LOW (ref 1.15–1.33)
CA-I SERPL-SCNC: 1.43 MMOL/L — HIGH (ref 1.15–1.33)
CALCIUM SERPL-MCNC: 8.3 MG/DL — LOW (ref 8.4–10.5)
CHLORIDE BLDV-SCNC: 102 MMOL/L — SIGNIFICANT CHANGE UP (ref 96–108)
CHLORIDE BLDV-SCNC: 103 MMOL/L — SIGNIFICANT CHANGE UP (ref 96–108)
CHLORIDE BLDV-SCNC: 103 MMOL/L — SIGNIFICANT CHANGE UP (ref 96–108)
CHLORIDE BLDV-SCNC: 104 MMOL/L — SIGNIFICANT CHANGE UP (ref 96–108)
CHLORIDE BLDV-SCNC: 106 MMOL/L — SIGNIFICANT CHANGE UP (ref 96–108)
CHLORIDE BLDV-SCNC: 106 MMOL/L — SIGNIFICANT CHANGE UP (ref 96–108)
CHLORIDE SERPL-SCNC: 105 MMOL/L — SIGNIFICANT CHANGE UP (ref 96–108)
CK MB BLD-MCNC: 5.5 % — HIGH (ref 0–3.5)
CK MB CFR SERPL CALC: 19.4 NG/ML — HIGH (ref 0–6.7)
CK SERPL-CCNC: 351 U/L — HIGH (ref 30–200)
CO2 BLDV-SCNC: 24 MMOL/L — SIGNIFICANT CHANGE UP (ref 22–26)
CO2 BLDV-SCNC: 26 MMOL/L — SIGNIFICANT CHANGE UP (ref 22–26)
CO2 BLDV-SCNC: 27 MMOL/L — HIGH (ref 22–26)
CO2 BLDV-SCNC: 28 MMOL/L — HIGH (ref 22–26)
CO2 BLDV-SCNC: 29 MMOL/L — HIGH (ref 22–26)
CO2 BLDV-SCNC: 29 MMOL/L — HIGH (ref 22–26)
CO2 SERPL-SCNC: 24 MMOL/L — SIGNIFICANT CHANGE UP (ref 22–31)
CREAT SERPL-MCNC: 0.82 MG/DL — SIGNIFICANT CHANGE UP (ref 0.5–1.3)
EGFR: 95 ML/MIN/1.73M2 — SIGNIFICANT CHANGE UP
EOSINOPHIL # BLD AUTO: 0 K/UL — SIGNIFICANT CHANGE UP (ref 0–0.5)
EOSINOPHIL NFR BLD AUTO: 0 % — SIGNIFICANT CHANGE UP (ref 0–6)
FIBRINOGEN PPP-MCNC: 221 MG/DL — SIGNIFICANT CHANGE UP (ref 200–445)
GAS PNL BLDA: SIGNIFICANT CHANGE UP
GAS PNL BLDV: 134 MMOL/L — LOW (ref 136–145)
GAS PNL BLDV: 136 MMOL/L — SIGNIFICANT CHANGE UP (ref 136–145)
GAS PNL BLDV: 137 MMOL/L — SIGNIFICANT CHANGE UP (ref 136–145)
GAS PNL BLDV: 137 MMOL/L — SIGNIFICANT CHANGE UP (ref 136–145)
GAS PNL BLDV: 138 MMOL/L — SIGNIFICANT CHANGE UP (ref 136–145)
GAS PNL BLDV: 138 MMOL/L — SIGNIFICANT CHANGE UP (ref 136–145)
GAS PNL BLDV: SIGNIFICANT CHANGE UP
GLUCOSE BLDV-MCNC: 107 MG/DL — HIGH (ref 70–99)
GLUCOSE BLDV-MCNC: 118 MG/DL — HIGH (ref 70–99)
GLUCOSE BLDV-MCNC: 141 MG/DL — HIGH (ref 70–99)
GLUCOSE BLDV-MCNC: 145 MG/DL — HIGH (ref 70–99)
GLUCOSE BLDV-MCNC: 150 MG/DL — HIGH (ref 70–99)
GLUCOSE BLDV-MCNC: 154 MG/DL — HIGH (ref 70–99)
GLUCOSE SERPL-MCNC: 145 MG/DL — HIGH (ref 70–99)
HCO3 BLDV-SCNC: 22 MMOL/L — SIGNIFICANT CHANGE UP (ref 22–29)
HCO3 BLDV-SCNC: 25 MMOL/L — SIGNIFICANT CHANGE UP (ref 22–29)
HCO3 BLDV-SCNC: 25 MMOL/L — SIGNIFICANT CHANGE UP (ref 22–29)
HCO3 BLDV-SCNC: 26 MMOL/L — SIGNIFICANT CHANGE UP (ref 22–29)
HCO3 BLDV-SCNC: 27 MMOL/L — SIGNIFICANT CHANGE UP (ref 22–29)
HCO3 BLDV-SCNC: 28 MMOL/L — SIGNIFICANT CHANGE UP (ref 22–29)
HCT VFR BLD CALC: 23.1 % — LOW (ref 39–50)
HCT VFR BLDA CALC: 29 % — LOW (ref 39–51)
HCT VFR BLDA CALC: 29 % — LOW (ref 39–51)
HCT VFR BLDA CALC: 30 % — LOW (ref 39–51)
HCT VFR BLDA CALC: 30 % — LOW (ref 39–51)
HCT VFR BLDA CALC: 31 % — LOW (ref 39–51)
HCT VFR BLDA CALC: 31 % — LOW (ref 39–51)
HEPARINASE TEG R TIME: 7.5 MIN — SIGNIFICANT CHANGE UP (ref 4.3–8.3)
HEPARINASE TEG R TIME: 8.9 MIN (ref 4.3–8.3)
HGB BLD CALC-MCNC: 10.1 G/DL — LOW (ref 12.6–17.4)
HGB BLD CALC-MCNC: 10.2 G/DL — LOW (ref 12.6–17.4)
HGB BLD CALC-MCNC: 10.2 G/DL — LOW (ref 12.6–17.4)
HGB BLD CALC-MCNC: 9.5 G/DL — LOW (ref 12.6–17.4)
HGB BLD CALC-MCNC: 9.8 G/DL — LOW (ref 12.6–17.4)
HGB BLD CALC-MCNC: 9.9 G/DL — LOW (ref 12.6–17.4)
HGB BLD-MCNC: 8.2 G/DL — LOW (ref 13–17)
INR BLD: 1.26 RATIO — HIGH (ref 0.85–1.18)
LACTATE BLDV-MCNC: 0.5 MMOL/L — SIGNIFICANT CHANGE UP (ref 0.5–2)
LACTATE BLDV-MCNC: 0.5 MMOL/L — SIGNIFICANT CHANGE UP (ref 0.5–2)
LACTATE BLDV-MCNC: 0.6 MMOL/L — SIGNIFICANT CHANGE UP (ref 0.5–2)
LACTATE BLDV-MCNC: 0.6 MMOL/L — SIGNIFICANT CHANGE UP (ref 0.5–2)
LACTATE BLDV-MCNC: 0.7 MMOL/L — SIGNIFICANT CHANGE UP (ref 0.5–2)
LACTATE BLDV-MCNC: 0.7 MMOL/L — SIGNIFICANT CHANGE UP (ref 0.5–2)
LYMPHOCYTES # BLD AUTO: 0.58 K/UL — LOW (ref 1–3.3)
LYMPHOCYTES # BLD AUTO: 18 % — SIGNIFICANT CHANGE UP (ref 13–44)
MANUAL SMEAR VERIFICATION: SIGNIFICANT CHANGE UP
MCHC RBC-ENTMCNC: 32 PG — SIGNIFICANT CHANGE UP (ref 27–34)
MCHC RBC-ENTMCNC: 35.5 GM/DL — SIGNIFICANT CHANGE UP (ref 32–36)
MCV RBC AUTO: 90.2 FL — SIGNIFICANT CHANGE UP (ref 80–100)
MONOCYTES # BLD AUTO: 0.1 K/UL — SIGNIFICANT CHANGE UP (ref 0–0.9)
MONOCYTES NFR BLD AUTO: 3 % — SIGNIFICANT CHANGE UP (ref 2–14)
NEUTROPHILS # BLD AUTO: 2.52 K/UL — SIGNIFICANT CHANGE UP (ref 1.8–7.4)
NEUTROPHILS NFR BLD AUTO: 76 % — SIGNIFICANT CHANGE UP (ref 43–77)
NEUTS BAND # BLD: 2 % — SIGNIFICANT CHANGE UP (ref 0–8)
NRBC # BLD: 0 /100 — SIGNIFICANT CHANGE UP (ref 0–0)
PCO2 BLDV: 43 MMHG — SIGNIFICANT CHANGE UP (ref 42–55)
PCO2 BLDV: 44 MMHG — SIGNIFICANT CHANGE UP (ref 42–55)
PCO2 BLDV: 45 MMHG — SIGNIFICANT CHANGE UP (ref 42–55)
PCO2 BLDV: 47 MMHG — SIGNIFICANT CHANGE UP (ref 42–55)
PCO2 BLDV: 48 MMHG — SIGNIFICANT CHANGE UP (ref 42–55)
PCO2 BLDV: 49 MMHG — SIGNIFICANT CHANGE UP (ref 42–55)
PH BLDV: 7.28 — LOW (ref 7.32–7.43)
PH BLDV: 7.34 — SIGNIFICANT CHANGE UP (ref 7.32–7.43)
PH BLDV: 7.35 — SIGNIFICANT CHANGE UP (ref 7.32–7.43)
PH BLDV: 7.36 — SIGNIFICANT CHANGE UP (ref 7.32–7.43)
PH BLDV: 7.38 — SIGNIFICANT CHANGE UP (ref 7.32–7.43)
PH BLDV: 7.41 — SIGNIFICANT CHANGE UP (ref 7.32–7.43)
PLAT MORPH BLD: NORMAL — SIGNIFICANT CHANGE UP
PLATELET # BLD AUTO: 97 K/UL — LOW (ref 150–400)
PLATELET CLUMP BLD QL SMEAR: ABNORMAL
PLATELET MAPPING ACTF MAX AMPLITUDE: 7.4 MM — SIGNIFICANT CHANGE UP (ref 2–19)
PLATELET MAPPING ADP MAXIMUM AMPLITUDE: 45.7 MM — SIGNIFICANT CHANGE UP (ref 45–69)
PLATELET MAPPING ADP PERCENT INHIBITION: 23.2 % (ref 0–17)
PLATELET MAPPING HKH MAXIMUM AMPLITUDE: 57.3 MM — SIGNIFICANT CHANGE UP (ref 53–68)
PO2 BLDV: 102 MMHG — HIGH (ref 25–45)
PO2 BLDV: 115 MMHG — HIGH (ref 25–45)
PO2 BLDV: 56 MMHG — HIGH (ref 25–45)
PO2 BLDV: 59 MMHG — HIGH (ref 25–45)
PO2 BLDV: 74 MMHG — HIGH (ref 25–45)
PO2 BLDV: 86 MMHG — HIGH (ref 25–45)
POTASSIUM BLDV-SCNC: 3.9 MMOL/L — SIGNIFICANT CHANGE UP (ref 3.5–5.1)
POTASSIUM BLDV-SCNC: 4 MMOL/L — SIGNIFICANT CHANGE UP (ref 3.5–5.1)
POTASSIUM BLDV-SCNC: 4.4 MMOL/L — SIGNIFICANT CHANGE UP (ref 3.5–5.1)
POTASSIUM BLDV-SCNC: 5.1 MMOL/L — SIGNIFICANT CHANGE UP (ref 3.5–5.1)
POTASSIUM BLDV-SCNC: 5.4 MMOL/L — HIGH (ref 3.5–5.1)
POTASSIUM BLDV-SCNC: 5.5 MMOL/L — HIGH (ref 3.5–5.1)
POTASSIUM SERPL-MCNC: 3.7 MMOL/L — SIGNIFICANT CHANGE UP (ref 3.5–5.3)
POTASSIUM SERPL-SCNC: 3.7 MMOL/L — SIGNIFICANT CHANGE UP (ref 3.5–5.3)
PROT SERPL-MCNC: 4.6 G/DL — LOW (ref 6–8.3)
PROTHROM AB SERPL-ACNC: 13.8 SEC — HIGH (ref 9.5–13)
RAPIDTEG MAXIMUM AMPLITUDE: 48.2 MM (ref 52–70)
RAPIDTEG MAXIMUM AMPLITUDE: 57.9 MM — SIGNIFICANT CHANGE UP (ref 52–70)
RBC # BLD: 2.56 M/UL — LOW (ref 4.2–5.8)
RBC # FLD: 13.4 % — SIGNIFICANT CHANGE UP (ref 10.3–14.5)
RBC BLD AUTO: NORMAL — SIGNIFICANT CHANGE UP
SAO2 % BLDV: 89.2 % — HIGH (ref 67–88)
SAO2 % BLDV: 89.9 % — HIGH (ref 67–88)
SAO2 % BLDV: 96.3 % — HIGH (ref 67–88)
SAO2 % BLDV: 98.1 % — HIGH (ref 67–88)
SAO2 % BLDV: 98.7 % — HIGH (ref 67–88)
SAO2 % BLDV: 98.8 % — HIGH (ref 67–88)
SODIUM SERPL-SCNC: 143 MMOL/L — SIGNIFICANT CHANGE UP (ref 135–145)
TEG FUNCTIONAL FIBRINOGEN: 12.9 MM (ref 15–32)
TEG FUNCTIONAL FIBRINOGEN: 5.6 MM (ref 15–32)
TEG MAXIMUM AMPLITUDE: 47.6 MM (ref 52–69)
TEG MAXIMUM AMPLITUDE: <40 MM (ref 52–69)
TEG REACTION TIME: 11.2 MIN (ref 4.6–9.1)
TEG REACTION TIME: >17 MIN (ref 4.6–9.1)
TROPONIN T, HIGH SENSITIVITY RESULT: 730 NG/L — HIGH (ref 0–51)
WBC # BLD: 3.23 K/UL — LOW (ref 3.8–10.5)
WBC # FLD AUTO: 3.23 K/UL — LOW (ref 3.8–10.5)

## 2023-08-07 PROCEDURE — 33519 CABG ARTERY-VEIN THREE: CPT

## 2023-08-07 PROCEDURE — 33533 CABG ARTERIAL SINGLE: CPT

## 2023-08-07 PROCEDURE — 99291 CRITICAL CARE FIRST HOUR: CPT

## 2023-08-07 PROCEDURE — 71045 X-RAY EXAM CHEST 1 VIEW: CPT | Mod: 26

## 2023-08-07 PROCEDURE — 33508 ENDOSCOPIC VEIN HARVEST: CPT | Mod: 59

## 2023-08-07 DEVICE — CANNULA VENOUS 2 STAGE LIGHTHOUSE TIP 28-38FR X 1/2" NON-VENTED: Type: IMPLANTABLE DEVICE | Status: FUNCTIONAL

## 2023-08-07 DEVICE — SURGICEL FIBRILLAR 2 X 4": Type: IMPLANTABLE DEVICE | Status: FUNCTIONAL

## 2023-08-07 DEVICE — SHUNT FLO-THRU INTRALUMINAL1.5MM X 18MM: Type: IMPLANTABLE DEVICE | Status: FUNCTIONAL

## 2023-08-07 DEVICE — CANNULA ANTEGRADE CARDIOPLEGIA 12 GA STRL: Type: IMPLANTABLE DEVICE | Status: FUNCTIONAL

## 2023-08-07 DEVICE — KIT CVC 2LUM MAC 9FR CHG: Type: IMPLANTABLE DEVICE | Status: FUNCTIONAL

## 2023-08-07 DEVICE — CANNULA AORTIC PERFUSION EZ GLIDE STRAIGHT 21FR X 35CM NON-VENTED: Type: IMPLANTABLE DEVICE | Status: FUNCTIONAL

## 2023-08-07 DEVICE — LIGATING CLIPS WECK HORIZON MEDIUM (BLUE) 24: Type: IMPLANTABLE DEVICE | Status: FUNCTIONAL

## 2023-08-07 DEVICE — BONE WAX 2.5GM: Type: IMPLANTABLE DEVICE | Status: FUNCTIONAL

## 2023-08-07 DEVICE — KIT CVC 1LUM 16GX20CM BLU FLX TIP: Type: IMPLANTABLE DEVICE | Status: FUNCTIONAL

## 2023-08-07 DEVICE — CANNULA RCSP 15FR X 12.5" AUTO-INFLATE CUFF WITH SOLID STYLET: Type: IMPLANTABLE DEVICE | Status: FUNCTIONAL

## 2023-08-07 DEVICE — SURGICEL 2 X 14": Type: IMPLANTABLE DEVICE | Status: FUNCTIONAL

## 2023-08-07 DEVICE — KIT A-LINE 1LUM 20G X 12CM SAFE KIT: Type: IMPLANTABLE DEVICE | Status: FUNCTIONAL

## 2023-08-07 DEVICE — LIGATING CLIPS WECK HORIZON SMALL-WIDE (RED) 24: Type: IMPLANTABLE DEVICE | Status: FUNCTIONAL

## 2023-08-07 DEVICE — CANNULA VESSEL 3MM BLUNT TIP CLEAR 1-WAY VALVE: Type: IMPLANTABLE DEVICE | Status: FUNCTIONAL

## 2023-08-07 DEVICE — PACING WIRE WHITE M-24 BAREWIRE 37MM X 89MM: Type: IMPLANTABLE DEVICE | Status: FUNCTIONAL

## 2023-08-07 DEVICE — SHUNT FLO-THRU INTRALUMINAL 1MM X 18MM: Type: IMPLANTABLE DEVICE | Status: FUNCTIONAL

## 2023-08-07 DEVICE — MEDIASTINAL CATH DRAIN 9MM: Type: IMPLANTABLE DEVICE | Status: FUNCTIONAL

## 2023-08-07 RX ORDER — SENNA PLUS 8.6 MG/1
2 TABLET ORAL AT BEDTIME
Refills: 0 | Status: DISCONTINUED | OUTPATIENT
Start: 2023-08-08 | End: 2023-08-12

## 2023-08-07 RX ORDER — SODIUM CHLORIDE 9 MG/ML
3 INJECTION INTRAMUSCULAR; INTRAVENOUS; SUBCUTANEOUS EVERY 8 HOURS
Refills: 0 | Status: DISCONTINUED | OUTPATIENT
Start: 2023-08-07 | End: 2023-08-07

## 2023-08-07 RX ORDER — DEXMEDETOMIDINE HYDROCHLORIDE IN 0.9% SODIUM CHLORIDE 4 UG/ML
0.5 INJECTION INTRAVENOUS
Qty: 200 | Refills: 0 | Status: DISCONTINUED | OUTPATIENT
Start: 2023-08-07 | End: 2023-08-10

## 2023-08-07 RX ORDER — HYDROMORPHONE HYDROCHLORIDE 2 MG/ML
0.5 INJECTION INTRAMUSCULAR; INTRAVENOUS; SUBCUTANEOUS EVERY 6 HOURS
Refills: 0 | Status: DISCONTINUED | OUTPATIENT
Start: 2023-08-07 | End: 2023-08-09

## 2023-08-07 RX ORDER — AMINOCAPROIC ACID 500 MG/1
5 TABLET ORAL ONCE
Refills: 0 | Status: COMPLETED | OUTPATIENT
Start: 2023-08-07 | End: 2023-08-07

## 2023-08-07 RX ORDER — SODIUM CHLORIDE 9 MG/ML
500 INJECTION, SOLUTION INTRAVENOUS ONCE
Refills: 0 | Status: COMPLETED | OUTPATIENT
Start: 2023-08-07 | End: 2023-08-07

## 2023-08-07 RX ORDER — CHLORHEXIDINE GLUCONATE 213 G/1000ML
1 SOLUTION TOPICAL DAILY
Refills: 0 | Status: DISCONTINUED | OUTPATIENT
Start: 2023-08-07 | End: 2023-08-15

## 2023-08-07 RX ORDER — CEFUROXIME AXETIL 250 MG
1500 TABLET ORAL EVERY 8 HOURS
Refills: 0 | Status: COMPLETED | OUTPATIENT
Start: 2023-08-07 | End: 2023-08-09

## 2023-08-07 RX ORDER — POTASSIUM CHLORIDE 20 MEQ
10 PACKET (EA) ORAL
Refills: 0 | Status: COMPLETED | OUTPATIENT
Start: 2023-08-07 | End: 2023-08-07

## 2023-08-07 RX ORDER — GABAPENTIN 400 MG/1
100 CAPSULE ORAL EVERY 8 HOURS
Refills: 0 | Status: COMPLETED | OUTPATIENT
Start: 2023-08-07 | End: 2023-08-12

## 2023-08-07 RX ORDER — INSULIN HUMAN 100 [IU]/ML
3 INJECTION, SOLUTION SUBCUTANEOUS
Qty: 100 | Refills: 0 | Status: DISCONTINUED | OUTPATIENT
Start: 2023-08-07 | End: 2023-08-10

## 2023-08-07 RX ORDER — ACETAMINOPHEN 500 MG
1000 TABLET ORAL ONCE
Refills: 0 | Status: COMPLETED | OUTPATIENT
Start: 2023-08-07 | End: 2023-08-07

## 2023-08-07 RX ORDER — ASCORBIC ACID 60 MG
500 TABLET,CHEWABLE ORAL
Refills: 0 | Status: COMPLETED | OUTPATIENT
Start: 2023-08-07 | End: 2023-08-12

## 2023-08-07 RX ORDER — DEXTROSE 50 % IN WATER 50 %
50 SYRINGE (ML) INTRAVENOUS
Refills: 0 | Status: DISCONTINUED | OUTPATIENT
Start: 2023-08-07 | End: 2023-08-13

## 2023-08-07 RX ORDER — POTASSIUM CHLORIDE 20 MEQ
10 PACKET (EA) ORAL
Refills: 0 | Status: DISCONTINUED | OUTPATIENT
Start: 2023-08-07 | End: 2023-08-13

## 2023-08-07 RX ORDER — SODIUM CHLORIDE 9 MG/ML
1000 INJECTION INTRAMUSCULAR; INTRAVENOUS; SUBCUTANEOUS
Refills: 0 | Status: DISCONTINUED | OUTPATIENT
Start: 2023-08-07 | End: 2023-08-13

## 2023-08-07 RX ORDER — VASOPRESSIN 20 [USP'U]/ML
0.04 INJECTION INTRAVENOUS
Qty: 40 | Refills: 0 | Status: DISCONTINUED | OUTPATIENT
Start: 2023-08-07 | End: 2023-08-10

## 2023-08-07 RX ORDER — TAMSULOSIN HYDROCHLORIDE 0.4 MG/1
1 CAPSULE ORAL
Refills: 0 | DISCHARGE

## 2023-08-07 RX ORDER — AMIODARONE HYDROCHLORIDE 400 MG/1
400 TABLET ORAL
Refills: 0 | Status: DISCONTINUED | OUTPATIENT
Start: 2023-08-07 | End: 2023-08-07

## 2023-08-07 RX ORDER — ACETAMINOPHEN 500 MG
650 TABLET ORAL EVERY 6 HOURS
Refills: 0 | Status: COMPLETED | OUTPATIENT
Start: 2023-08-07 | End: 2023-08-10

## 2023-08-07 RX ORDER — POLYETHYLENE GLYCOL 3350 17 G/17G
17 POWDER, FOR SOLUTION ORAL DAILY
Refills: 0 | Status: DISCONTINUED | OUTPATIENT
Start: 2023-08-08 | End: 2023-08-15

## 2023-08-07 RX ORDER — OXYCODONE HYDROCHLORIDE 5 MG/1
10 TABLET ORAL EVERY 4 HOURS
Refills: 0 | Status: DISCONTINUED | OUTPATIENT
Start: 2023-08-07 | End: 2023-08-08

## 2023-08-07 RX ORDER — CHLORHEXIDINE GLUCONATE 213 G/1000ML
15 SOLUTION TOPICAL EVERY 12 HOURS
Refills: 0 | Status: DISCONTINUED | OUTPATIENT
Start: 2023-08-07 | End: 2023-08-07

## 2023-08-07 RX ORDER — SODIUM CHLORIDE 9 MG/ML
250 INJECTION, SOLUTION INTRAVENOUS ONCE
Refills: 0 | Status: COMPLETED | OUTPATIENT
Start: 2023-08-07 | End: 2023-08-07

## 2023-08-07 RX ORDER — MAGNESIUM SULFATE 500 MG/ML
1 VIAL (ML) INJECTION ONCE
Refills: 0 | Status: COMPLETED | OUTPATIENT
Start: 2023-08-07 | End: 2023-08-07

## 2023-08-07 RX ORDER — PANTOPRAZOLE SODIUM 20 MG/1
40 TABLET, DELAYED RELEASE ORAL DAILY
Refills: 0 | Status: DISCONTINUED | OUTPATIENT
Start: 2023-08-07 | End: 2023-08-15

## 2023-08-07 RX ORDER — DEXTROSE 50 % IN WATER 50 %
25 SYRINGE (ML) INTRAVENOUS
Refills: 0 | Status: DISCONTINUED | OUTPATIENT
Start: 2023-08-07 | End: 2023-08-13

## 2023-08-07 RX ORDER — GABAPENTIN 400 MG/1
200 CAPSULE ORAL ONCE
Refills: 0 | Status: COMPLETED | OUTPATIENT
Start: 2023-08-07 | End: 2023-08-07

## 2023-08-07 RX ORDER — OXYCODONE HYDROCHLORIDE 5 MG/1
5 TABLET ORAL EVERY 4 HOURS
Refills: 0 | Status: DISCONTINUED | OUTPATIENT
Start: 2023-08-07 | End: 2023-08-08

## 2023-08-07 RX ADMIN — HYDROMORPHONE HYDROCHLORIDE 0.5 MILLIGRAM(S): 2 INJECTION INTRAMUSCULAR; INTRAVENOUS; SUBCUTANEOUS at 19:00

## 2023-08-07 RX ADMIN — HYDROMORPHONE HYDROCHLORIDE 0.5 MILLIGRAM(S): 2 INJECTION INTRAMUSCULAR; INTRAVENOUS; SUBCUTANEOUS at 21:00

## 2023-08-07 RX ADMIN — AMINOCAPROIC ACID 250 GRAM(S): 500 TABLET ORAL at 19:34

## 2023-08-07 RX ADMIN — SODIUM CHLORIDE 3 MILLILITER(S): 9 INJECTION INTRAMUSCULAR; INTRAVENOUS; SUBCUTANEOUS at 06:08

## 2023-08-07 RX ADMIN — SODIUM CHLORIDE 1500 MILLILITER(S): 9 INJECTION, SOLUTION INTRAVENOUS at 19:07

## 2023-08-07 RX ADMIN — INSULIN HUMAN 3 UNIT(S)/HR: 100 INJECTION, SOLUTION SUBCUTANEOUS at 20:32

## 2023-08-07 RX ADMIN — Medication 100 MILLIGRAM(S): at 20:32

## 2023-08-07 RX ADMIN — DEXMEDETOMIDINE HYDROCHLORIDE IN 0.9% SODIUM CHLORIDE 10.6 MICROGRAM(S)/KG/HR: 4 INJECTION INTRAVENOUS at 19:22

## 2023-08-07 RX ADMIN — Medication 100 MILLIEQUIVALENT(S): at 21:31

## 2023-08-07 RX ADMIN — CHLORHEXIDINE GLUCONATE 15 MILLILITER(S): 213 SOLUTION TOPICAL at 19:19

## 2023-08-07 RX ADMIN — SODIUM CHLORIDE 3000 MILLILITER(S): 9 INJECTION, SOLUTION INTRAVENOUS at 17:30

## 2023-08-07 RX ADMIN — HYDROMORPHONE HYDROCHLORIDE 0.5 MILLIGRAM(S): 2 INJECTION INTRAMUSCULAR; INTRAVENOUS; SUBCUTANEOUS at 18:15

## 2023-08-07 RX ADMIN — HYDROMORPHONE HYDROCHLORIDE 0.5 MILLIGRAM(S): 2 INJECTION INTRAMUSCULAR; INTRAVENOUS; SUBCUTANEOUS at 20:40

## 2023-08-07 RX ADMIN — SODIUM CHLORIDE 3000 MILLILITER(S): 9 INJECTION, SOLUTION INTRAVENOUS at 18:00

## 2023-08-07 RX ADMIN — Medication 1000 MILLIGRAM(S): at 06:13

## 2023-08-07 RX ADMIN — Medication 100 MILLIEQUIVALENT(S): at 23:00

## 2023-08-07 RX ADMIN — GABAPENTIN 200 MILLIGRAM(S): 400 CAPSULE ORAL at 06:12

## 2023-08-07 RX ADMIN — Medication 100 GRAM(S): at 17:00

## 2023-08-07 RX ADMIN — Medication 100 MILLIEQUIVALENT(S): at 22:00

## 2023-08-07 RX ADMIN — VASOPRESSIN 6 UNIT(S)/MIN: 20 INJECTION INTRAVENOUS at 19:22

## 2023-08-07 RX ADMIN — CHLORHEXIDINE GLUCONATE 1 APPLICATION(S): 213 SOLUTION TOPICAL at 19:08

## 2023-08-07 NOTE — AIRWAY PLACEMENT NOTE ADULT - AIRWAY COMMENTS:
Patient was intubated in OR, with size 8 ETT, secured with ET tube shahid at 24 cm at lip line. ETT was confirmed with equal bilateral breaths sounds noted upon auscultation by MD at bedside. Patient is placed on mechanical ventilation on AC/VC mode with settings as documented on flow sheet as per MD's order post intubation, tolerating well. No signs of respiratory distress noted. Will continue to monitor.

## 2023-08-07 NOTE — BRIEF OPERATIVE NOTE - NSICDXBRIEFPROCEDURE_GEN_ALL_CORE_FT
PROCEDURES:  CABG, 4 or 5 vessels 07-Aug-2023 16:56:07 CABG x 4 (LIMA-LAD, SVG-OM, SVG-Diag, SVG-PDA) Miko Castano

## 2023-08-07 NOTE — PRE-ANESTHESIA EVALUATION ADULT - NSANTHPROCED_GEN_ALL_CORE
Arterial Catheter/Central Venous Catheter Arterial Catheter/Central Venous Catheter/Transesophageal Echocardiogram

## 2023-08-07 NOTE — PRE-ANESTHESIA EVALUATION ADULT - LAST CARDIAC ANGIOGRAM/IMAGING
6/2023; Cath via Right radial artery pertinent for 80% stenosis pLAD, 70% stenosis mLAD, 85% stenosis marginal branch of LCX, 40% stenosis distal RCA, 80% stenosis right PDA

## 2023-08-07 NOTE — PROGRESS NOTE ADULT - SUBJECTIVE AND OBJECTIVE BOX
Patient seen and examined at the bedside.    Remained critically ill on continuous ICU monitoring.      Brief Summary:  67 year old male presents for CABG X 4. Pt. with a history of HTN s/p CT angio which revealed CAD, followed by cardiac cath which revealed significant CAD s/p CABGx5.       24 Hour events:  - Taken to the OR for CABG, LIMA- LAD 8/7/23   - Drips: Vasopressin and Precedex  - Echo: N/A  - Blood Products:  2 plts,2 ffp, 5 cyro      OBJECTIVE:  Vital Signs Last 24 Hrs  T(C): 35.2 (07 Aug 2023 16:30), Max: 36.7 (07 Aug 2023 05:42)  T(F): 95.4 (07 Aug 2023 16:30), Max: 98.1 (07 Aug 2023 05:42)  HR: 73 (07 Aug 2023 17:45) (57 - 74)  BP: 104/55 (07 Aug 2023 16:45) (104/55 - 147/98)  BP(mean): 74 (07 Aug 2023 16:45) (74 - 74)  RR: 11 (07 Aug 2023 17:15) (11 - 16)  SpO2: 100% (07 Aug 2023 17:45) (97% - 100%)    Parameters below as of 07 Aug 2023 16:30  Patient On (Oxygen Delivery Method): ventilator    O2 Concentration (%): 100    Mode: AC/ CMV (Assist Control/ Continuous Mandatory Ventilation)  RR (machine): 12  TV (machine): 550  FiO2: 100  PEEP: 5  ITime: 1  MAP: 9  PIP: 17              Physical Exam:   General: sedated  Neurology: intact  ENT: trachea midline  Respiratory: on ventilation  CV: S1S2, no murmurs        [x] Sternal dressing, [x] Mediastinal CTx2, [x] Pleural CT, [x] Bulb        [x] Sinus rhythm, [x] Temporary pacing- VVI- stand by mode   Abdominal: Soft, NT  : Flor   Extremities: warm, well perfused, moving all extremities   Skin: No Rashes, Hematoma, Ecchymosis         -------------------------------------------------------------------------------------------------------------------------------    Labs:                        8.2    3.23  )-----------( x        ( 07 Aug 2023 16:54 )             23.1     08-07    143  |  105  |  12  ----------------------------<  145<H>  3.7   |  24  |  0.82    Ca    8.3<L>      07 Aug 2023 16:54    TPro  4.6<L>  /  Alb  3.3  /  TBili  0.7  /  DBili  x   /  AST  26  /  ALT  12  /  AlkPhos  30<L>  08-07    LIVER FUNCTIONS - ( 07 Aug 2023 16:54 )  Alb: 3.3 g/dL / Pro: 4.6 g/dL / ALK PHOS: 30 U/L / ALT: 12 U/L / AST: 26 U/L / GGT: x           PT/INR - ( 07 Aug 2023 16:37 )   PT: 13.8 sec;   INR: 1.26 ratio         PTT - ( 07 Aug 2023 16:37 )  PTT:29.0 sec  ABG - ( 07 Aug 2023 16:40 )  pH, Arterial: 7.39  pH, Blood: x     /  pCO2: 42    /  pO2: 303   / HCO3: 25    / Base Excess: 0.4   /  SaO2: 98.3    ------------------------------------------------------------------------------------------------------------------------------  Assessment:  67 year old male presents for CABG X 4. Pt. with a history of HTN s/p CT angio which revealed CAD, followed by cardiac cath which revealed significant CAD and CABG recommended. Pt. very active, walks 4 miles daily, denies chest pain, denies palpitations denies dizziness and denies dyspnea on exertion.    CAD s/p CABGx5 LIMA-LAD  Post-op respiratory insufficiency  Stress Hyperglycemia   Hemodynamic Instability  Hypovolemia        Plan:   ***Neuro***  - Post operative neuro assessment when sedation has cleared.  - Sedation as needed while intubated.  - Postoperative acute pain control with Gabapentin, oxy, and Dilaudid.    ***Cardiovascular***  - Invasive hemodynamic monitoring, assess perfusion indices   - At risk for hemodynamic instability and cardiac arrhythmias.  - IVF for perioperative hypovolemia.  - Wean pressors for MAP > 65 mm Hg   - Monitor chest tube output.    ***Pulmonary***  - Postoperative acute respiratory insufficiency   - Wean ventilator as tolerated.   - Deep breathing and coughing exercises.    ***GI***  - NPO for now.   - Protonix for stress ulcer prophylaxis   - Bowel regimen.    ***Renal***  - Flor catheter for strict I/O measurements.   - Replete electrolytes as indicated.    ***ID***  - Perioperative coverage with Cefuroxime   - Vancomycin IV for perioperative prophylaxis     ***Endocrine***  - Stress Hyperglycemia  - Insulin as needed to maintain euglycemia.    ***Hematology***  - Acute blood loss anemia and thrombocytopenia   - Transfusion indication - 2 plts,2 ffp, 5 cyro  - At risk for Bleeding       Patient requires continuous monitoring with bedside rhythm monitoring, pulse oximetry monitoring, and continuous central venous and arterial pressure monitoring; and intermittent blood gas analysis. Care plan discussed with the ICU care team.   Patient remained critical, at risk for life threatening decompensation.    I have spent 30 minutes providing critical care management to this patient.    By signing my name below, I, Zabrina Walker, attest that this documentation has been prepared under the direction and in the presence of Alexa Berger DO  Electronically signed: Trip Varghese. 08-07-23 @ 17:43    I, Alexa Berger DO, personally performed the services described in this documentation. all medical record entries made by the scribe were at my direction and in my presence. I have reviewed the chart and agree that the record reflects my personal performance and is accurate and complete  Electronically signed: Alexa Berger DO, Patient seen and examined at the bedside.    Remained critically ill on continuous ICU monitoring.      Brief Summary:  66 yo M with PMHx non-hodgkins lymphoma, HLD, UC and CAD s/p CABG x 4 on 8/7/2023.      24 Hour events:  - Ongoing resuscitation with fluids and albumin.  - Arrived on vasopressin, now off.  - Ongoing ventilator wean.      OBJECTIVE:  Vital Signs Last 24 Hrs  T(C): 35.2 (07 Aug 2023 16:30), Max: 36.7 (07 Aug 2023 05:42)  T(F): 95.4 (07 Aug 2023 16:30), Max: 98.1 (07 Aug 2023 05:42)  HR: 73 (07 Aug 2023 17:45) (57 - 74)  BP: 104/55 (07 Aug 2023 16:45) (104/55 - 147/98)  BP(mean): 74 (07 Aug 2023 16:45) (74 - 74)  RR: 11 (07 Aug 2023 17:15) (11 - 16)  SpO2: 100% (07 Aug 2023 17:45) (97% - 100%)    Parameters below as of 07 Aug 2023 16:30  Patient On (Oxygen Delivery Method): ventilator    O2 Concentration (%): 100    Mode: AC/ CMV (Assist Control/ Continuous Mandatory Ventilation)  RR (machine): 12  TV (machine): 550  FiO2: 100  PEEP: 5  ITime: 1  MAP: 9  PIP: 17              Physical Exam:   General: awake  Neurology: intact, follows commands  ENT: trachea midline  Respiratory: on ventilator, synchronous with ventilator  CV: Sinus rhythm, no murmurs        [x] Sternal dressing, [x] Mediastinal CTx2, [x] Pleural CT, [x] Bulb  Abdominal: Soft, ND  : Flor   Extremities: warm, well perfused, moving all extremities   Skin: No Rashes, Hematoma, Ecchymosis         -------------------------------------------------------------------------------------------------------------------------------    Labs:                        8.2    3.23  )-----------( 97        ( 07 Aug 2023 16:54 )             23.1     08-07    143  |  105  |  12  ----------------------------<  145<H>  3.7   |  24  |  0.82    Ca    8.3<L>      07 Aug 2023 16:54    TPro  4.6<L>  /  Alb  3.3  /  TBili  0.7  /  DBili  x   /  AST  26  /  ALT  12  /  AlkPhos  30<L>  08-07    LIVER FUNCTIONS - ( 07 Aug 2023 16:54 )  Alb: 3.3 g/dL / Pro: 4.6 g/dL / ALK PHOS: 30 U/L / ALT: 12 U/L / AST: 26 U/L / GGT: x           PT/INR - ( 07 Aug 2023 16:37 )   PT: 13.8 sec;   INR: 1.26 ratio         PTT - ( 07 Aug 2023 16:37 )  PTT:29.0 sec  ABG - ( 07 Aug 2023 16:40 )  pH, Arterial: 7.39  pH, Blood: x     /  pCO2: 42    /  pO2: 303   / HCO3: 25    / Base Excess: 0.4   /  SaO2: 98.3    ------------------------------------------------------------------------------------------------------------------------------  Assessment:  66 yo M with PMHx non-hodgkins lymphoma, HLD, UC and CAD s/p CABG x 4 on 8/7/2023.    S/p CABG x 4 on 8/7/2023  Post-operative pain  At risk for hemodynamic instability  Post-op acute respiratory insufficiency  Stress Hyperglycemia   Acute blood loss anemia  Thrombocytopenia      Plan:   ***Neuro***  - Post operative neuro assessment when sedation has cleared.  - Sedation as needed while intubated.  - Postoperative acute pain control with Gabapentin and PRNs    ***Cardiovascular***  - Invasive hemodynamic monitoring, assess perfusion indices   - At risk for hemodynamic instability and cardiac arrhythmias.  - Keep MAP > 65 mm Hg   - Monitor chest tube output.    ***Pulmonary***  - Postoperative acute respiratory insufficiency   - Wean ventilator as tolerated.   Wean to extubate when parameters are met.  - Deep breathing and coughing exercises.    ***GI***  - NPO for now.   - Protonix for stress ulcer prophylaxis   - Bowel regimen.    ***Renal***  - Flor catheter for strict I/O measurements.   - Replete electrolytes as indicated.    ***ID***  - Perioperative coverage with Cefuroxime     ***Endocrine***  - Stress Hyperglycemia  - Insulin as needed to maintain euglycemia.    ***Hematology***  - Acute blood loss anemia and thrombocytopenia   - At risk for Bleeding       Patient requires continuous monitoring with bedside rhythm monitoring, pulse oximetry monitoring, and continuous central venous and arterial pressure monitoring; and intermittent blood gas analysis. Care plan discussed with the ICU care team.   Patient remained critical, at risk for life threatening decompensation.    I have spent 50 minutes providing critical care management to this patient.    By signing my name below, I, Zabrina Walker, attest that this documentation has been prepared under the direction and in the presence of Alexa Berger DO  Electronically signed: Trip Varghese. 08-07-23 @ 17:43    I, Alexa Berger DO, personally performed the services described in this documentation. all medical record entries made by the scribe were at my direction and in my presence. I have reviewed the chart and agree that the record reflects my personal performance and is accurate and complete  Electronically signed: Alexa Berger DO,

## 2023-08-07 NOTE — BRIEF OPERATIVE NOTE - COMMENTS
Blood loss unable to be accurately calculated due to the use of cellsaver and cardiotomy suction    No unexpected foreign bodies were apparent on preliminary review of post-op x-ray. Reviewed by Dr. Altamirano

## 2023-08-07 NOTE — AIRWAY REMOVAL NOTE  ADULT & PEDS - ARTIFICAL AIRWAY REMOVAL COMMENTS
Written order for extubation verified, patient was identified by full name and birth date compared to the identification band. Present during the procedure was ... Jesi Estrada)

## 2023-08-08 DIAGNOSIS — Z95.1 PRESENCE OF AORTOCORONARY BYPASS GRAFT: ICD-10-CM

## 2023-08-08 LAB
ALBUMIN SERPL ELPH-MCNC: 3.5 G/DL — SIGNIFICANT CHANGE UP (ref 3.3–5)
ALP SERPL-CCNC: 33 U/L — LOW (ref 40–120)
ALT FLD-CCNC: 14 U/L — SIGNIFICANT CHANGE UP (ref 10–45)
ANION GAP SERPL CALC-SCNC: 9 MMOL/L — SIGNIFICANT CHANGE UP (ref 5–17)
APTT BLD: 27 SEC — SIGNIFICANT CHANGE UP (ref 24.5–35.6)
AST SERPL-CCNC: 32 U/L — SIGNIFICANT CHANGE UP (ref 10–40)
BASOPHILS # BLD AUTO: 0.02 K/UL — SIGNIFICANT CHANGE UP (ref 0–0.2)
BASOPHILS NFR BLD AUTO: 0.4 % — SIGNIFICANT CHANGE UP (ref 0–2)
BILIRUB SERPL-MCNC: 0.6 MG/DL — SIGNIFICANT CHANGE UP (ref 0.2–1.2)
BUN SERPL-MCNC: 11 MG/DL — SIGNIFICANT CHANGE UP (ref 7–23)
CALCIUM SERPL-MCNC: 8.3 MG/DL — LOW (ref 8.4–10.5)
CHLORIDE SERPL-SCNC: 108 MMOL/L — SIGNIFICANT CHANGE UP (ref 96–108)
CO2 SERPL-SCNC: 25 MMOL/L — SIGNIFICANT CHANGE UP (ref 22–31)
CREAT SERPL-MCNC: 0.92 MG/DL — SIGNIFICANT CHANGE UP (ref 0.5–1.3)
EGFR: 90 ML/MIN/1.73M2 — SIGNIFICANT CHANGE UP
EOSINOPHIL # BLD AUTO: 0.03 K/UL — SIGNIFICANT CHANGE UP (ref 0–0.5)
EOSINOPHIL NFR BLD AUTO: 0.6 % — SIGNIFICANT CHANGE UP (ref 0–6)
FIBRINOGEN PPP-MCNC: 336 MG/DL — SIGNIFICANT CHANGE UP (ref 200–445)
GAS PNL BLDA: SIGNIFICANT CHANGE UP
GLUCOSE SERPL-MCNC: 109 MG/DL — HIGH (ref 70–99)
HCT VFR BLD CALC: 27.7 % — LOW (ref 39–50)
HGB BLD-MCNC: 9.5 G/DL — LOW (ref 13–17)
IMM GRANULOCYTES NFR BLD AUTO: 0.8 % — SIGNIFICANT CHANGE UP (ref 0–0.9)
INR BLD: 1.08 RATIO — SIGNIFICANT CHANGE UP (ref 0.85–1.18)
LYMPHOCYTES # BLD AUTO: 0.28 K/UL — LOW (ref 1–3.3)
LYMPHOCYTES # BLD AUTO: 5.3 % — LOW (ref 13–44)
MAGNESIUM SERPL-MCNC: 2.4 MG/DL — SIGNIFICANT CHANGE UP (ref 1.6–2.6)
MCHC RBC-ENTMCNC: 31.3 PG — SIGNIFICANT CHANGE UP (ref 27–34)
MCHC RBC-ENTMCNC: 34.3 GM/DL — SIGNIFICANT CHANGE UP (ref 32–36)
MCV RBC AUTO: 91.1 FL — SIGNIFICANT CHANGE UP (ref 80–100)
MONOCYTES # BLD AUTO: 0.43 K/UL — SIGNIFICANT CHANGE UP (ref 0–0.9)
MONOCYTES NFR BLD AUTO: 8.1 % — SIGNIFICANT CHANGE UP (ref 2–14)
NEUTROPHILS # BLD AUTO: 4.5 K/UL — SIGNIFICANT CHANGE UP (ref 1.8–7.4)
NEUTROPHILS NFR BLD AUTO: 84.8 % — HIGH (ref 43–77)
NRBC # BLD: 0 /100 WBCS — SIGNIFICANT CHANGE UP (ref 0–0)
PHOSPHATE SERPL-MCNC: 1.9 MG/DL — LOW (ref 2.5–4.5)
PLATELET # BLD AUTO: 138 K/UL — LOW (ref 150–400)
POTASSIUM SERPL-MCNC: 4 MMOL/L — SIGNIFICANT CHANGE UP (ref 3.5–5.3)
POTASSIUM SERPL-SCNC: 4 MMOL/L — SIGNIFICANT CHANGE UP (ref 3.5–5.3)
PROT SERPL-MCNC: 5.3 G/DL — LOW (ref 6–8.3)
PROTHROM AB SERPL-ACNC: 11.9 SEC — SIGNIFICANT CHANGE UP (ref 9.5–13)
RBC # BLD: 3.04 M/UL — LOW (ref 4.2–5.8)
RBC # FLD: 14.1 % — SIGNIFICANT CHANGE UP (ref 10.3–14.5)
SODIUM SERPL-SCNC: 142 MMOL/L — SIGNIFICANT CHANGE UP (ref 135–145)
TSH SERPL-MCNC: 0.26 UIU/ML — LOW (ref 0.27–4.2)
WBC # BLD: 5.3 K/UL — SIGNIFICANT CHANGE UP (ref 3.8–10.5)
WBC # FLD AUTO: 5.3 K/UL — SIGNIFICANT CHANGE UP (ref 3.8–10.5)

## 2023-08-08 PROCEDURE — 93010 ELECTROCARDIOGRAM REPORT: CPT

## 2023-08-08 PROCEDURE — 99291 CRITICAL CARE FIRST HOUR: CPT

## 2023-08-08 PROCEDURE — 71045 X-RAY EXAM CHEST 1 VIEW: CPT | Mod: 26

## 2023-08-08 RX ORDER — ATORVASTATIN CALCIUM 80 MG/1
40 TABLET, FILM COATED ORAL ONCE
Refills: 0 | Status: COMPLETED | OUTPATIENT
Start: 2023-08-08 | End: 2023-08-08

## 2023-08-08 RX ORDER — ASPIRIN/CALCIUM CARB/MAGNESIUM 324 MG
81 TABLET ORAL DAILY
Refills: 0 | Status: DISCONTINUED | OUTPATIENT
Start: 2023-08-08 | End: 2023-08-15

## 2023-08-08 RX ORDER — METOPROLOL TARTRATE 50 MG
25 TABLET ORAL EVERY 8 HOURS
Refills: 0 | Status: DISCONTINUED | OUTPATIENT
Start: 2023-08-08 | End: 2023-08-09

## 2023-08-08 RX ORDER — HYDROMORPHONE HYDROCHLORIDE 2 MG/ML
0.5 INJECTION INTRAMUSCULAR; INTRAVENOUS; SUBCUTANEOUS ONCE
Refills: 0 | Status: DISCONTINUED | OUTPATIENT
Start: 2023-08-08 | End: 2023-08-07

## 2023-08-08 RX ORDER — POTASSIUM CHLORIDE 20 MEQ
10 PACKET (EA) ORAL
Refills: 0 | Status: COMPLETED | OUTPATIENT
Start: 2023-08-08 | End: 2023-08-08

## 2023-08-08 RX ORDER — HYDROMORPHONE HYDROCHLORIDE 2 MG/ML
0.5 INJECTION INTRAMUSCULAR; INTRAVENOUS; SUBCUTANEOUS ONCE
Refills: 0 | Status: DISCONTINUED | OUTPATIENT
Start: 2023-08-08 | End: 2023-08-08

## 2023-08-08 RX ORDER — HYDROMORPHONE HYDROCHLORIDE 2 MG/ML
4 INJECTION INTRAMUSCULAR; INTRAVENOUS; SUBCUTANEOUS EVERY 4 HOURS
Refills: 0 | Status: DISCONTINUED | OUTPATIENT
Start: 2023-08-08 | End: 2023-08-15

## 2023-08-08 RX ORDER — METOCLOPRAMIDE HCL 10 MG
10 TABLET ORAL EVERY 8 HOURS
Refills: 0 | Status: COMPLETED | OUTPATIENT
Start: 2023-08-08 | End: 2023-08-10

## 2023-08-08 RX ORDER — INSULIN LISPRO 100/ML
VIAL (ML) SUBCUTANEOUS
Refills: 0 | Status: DISCONTINUED | OUTPATIENT
Start: 2023-08-08 | End: 2023-08-13

## 2023-08-08 RX ORDER — ATORVASTATIN CALCIUM 80 MG/1
80 TABLET, FILM COATED ORAL AT BEDTIME
Refills: 0 | Status: DISCONTINUED | OUTPATIENT
Start: 2023-08-08 | End: 2023-08-15

## 2023-08-08 RX ORDER — HYDROMORPHONE HYDROCHLORIDE 2 MG/ML
2 INJECTION INTRAMUSCULAR; INTRAVENOUS; SUBCUTANEOUS
Refills: 0 | Status: DISCONTINUED | OUTPATIENT
Start: 2023-08-08 | End: 2023-08-11

## 2023-08-08 RX ORDER — HYDROMORPHONE HYDROCHLORIDE 2 MG/ML
1 INJECTION INTRAMUSCULAR; INTRAVENOUS; SUBCUTANEOUS ONCE
Refills: 0 | Status: DISCONTINUED | OUTPATIENT
Start: 2023-08-08 | End: 2023-08-08

## 2023-08-08 RX ADMIN — Medication 1: at 17:27

## 2023-08-08 RX ADMIN — HYDROMORPHONE HYDROCHLORIDE 1 MILLIGRAM(S): 2 INJECTION INTRAMUSCULAR; INTRAVENOUS; SUBCUTANEOUS at 16:15

## 2023-08-08 RX ADMIN — GABAPENTIN 100 MILLIGRAM(S): 400 CAPSULE ORAL at 21:43

## 2023-08-08 RX ADMIN — GABAPENTIN 100 MILLIGRAM(S): 400 CAPSULE ORAL at 05:13

## 2023-08-08 RX ADMIN — Medication 650 MILLIGRAM(S): at 05:45

## 2023-08-08 RX ADMIN — PANTOPRAZOLE SODIUM 40 MILLIGRAM(S): 20 TABLET, DELAYED RELEASE ORAL at 11:20

## 2023-08-08 RX ADMIN — ATORVASTATIN CALCIUM 80 MILLIGRAM(S): 80 TABLET, FILM COATED ORAL at 21:43

## 2023-08-08 RX ADMIN — Medication 650 MILLIGRAM(S): at 17:24

## 2023-08-08 RX ADMIN — Medication 25 MILLIGRAM(S): at 21:42

## 2023-08-08 RX ADMIN — Medication 100 MILLIEQUIVALENT(S): at 02:15

## 2023-08-08 RX ADMIN — OXYCODONE HYDROCHLORIDE 10 MILLIGRAM(S): 5 TABLET ORAL at 13:23

## 2023-08-08 RX ADMIN — ATORVASTATIN CALCIUM 40 MILLIGRAM(S): 80 TABLET, FILM COATED ORAL at 04:41

## 2023-08-08 RX ADMIN — OXYCODONE HYDROCHLORIDE 10 MILLIGRAM(S): 5 TABLET ORAL at 09:58

## 2023-08-08 RX ADMIN — OXYCODONE HYDROCHLORIDE 10 MILLIGRAM(S): 5 TABLET ORAL at 13:40

## 2023-08-08 RX ADMIN — Medication 85 MILLIMOLE(S): at 09:21

## 2023-08-08 RX ADMIN — HYDROMORPHONE HYDROCHLORIDE 4 MILLIGRAM(S): 2 INJECTION INTRAMUSCULAR; INTRAVENOUS; SUBCUTANEOUS at 22:39

## 2023-08-08 RX ADMIN — Medication 500 MILLIGRAM(S): at 05:13

## 2023-08-08 RX ADMIN — Medication 650 MILLIGRAM(S): at 13:06

## 2023-08-08 RX ADMIN — HYDROMORPHONE HYDROCHLORIDE 0.5 MILLIGRAM(S): 2 INJECTION INTRAMUSCULAR; INTRAVENOUS; SUBCUTANEOUS at 06:00

## 2023-08-08 RX ADMIN — HYDROMORPHONE HYDROCHLORIDE 0.5 MILLIGRAM(S): 2 INJECTION INTRAMUSCULAR; INTRAVENOUS; SUBCUTANEOUS at 05:30

## 2023-08-08 RX ADMIN — Medication 10 MILLIGRAM(S): at 14:32

## 2023-08-08 RX ADMIN — Medication 650 MILLIGRAM(S): at 05:13

## 2023-08-08 RX ADMIN — OXYCODONE HYDROCHLORIDE 10 MILLIGRAM(S): 5 TABLET ORAL at 09:20

## 2023-08-08 RX ADMIN — Medication 500 MILLIGRAM(S): at 17:23

## 2023-08-08 RX ADMIN — SENNA PLUS 2 TABLET(S): 8.6 TABLET ORAL at 21:42

## 2023-08-08 RX ADMIN — HYDROMORPHONE HYDROCHLORIDE 1 MILLIGRAM(S): 2 INJECTION INTRAMUSCULAR; INTRAVENOUS; SUBCUTANEOUS at 15:59

## 2023-08-08 RX ADMIN — Medication 1: at 21:39

## 2023-08-08 RX ADMIN — Medication 1: at 12:00

## 2023-08-08 RX ADMIN — Medication 100 MILLIGRAM(S): at 04:41

## 2023-08-08 RX ADMIN — Medication 81 MILLIGRAM(S): at 01:48

## 2023-08-08 RX ADMIN — Medication 25 MILLIGRAM(S): at 02:11

## 2023-08-08 RX ADMIN — CHLORHEXIDINE GLUCONATE 1 APPLICATION(S): 213 SOLUTION TOPICAL at 13:07

## 2023-08-08 RX ADMIN — Medication 650 MILLIGRAM(S): at 11:20

## 2023-08-08 RX ADMIN — HYDROMORPHONE HYDROCHLORIDE 0.5 MILLIGRAM(S): 2 INJECTION INTRAMUSCULAR; INTRAVENOUS; SUBCUTANEOUS at 05:45

## 2023-08-08 RX ADMIN — Medication 100 MILLIGRAM(S): at 12:04

## 2023-08-08 RX ADMIN — Medication 25 MILLIGRAM(S): at 11:20

## 2023-08-08 RX ADMIN — Medication 100 MILLIGRAM(S): at 22:19

## 2023-08-08 RX ADMIN — Medication 100 MILLIEQUIVALENT(S): at 02:11

## 2023-08-08 RX ADMIN — Medication 10 MILLIGRAM(S): at 21:44

## 2023-08-08 RX ADMIN — Medication 650 MILLIGRAM(S): at 23:23

## 2023-08-08 RX ADMIN — HYDROMORPHONE HYDROCHLORIDE 4 MILLIGRAM(S): 2 INJECTION INTRAMUSCULAR; INTRAVENOUS; SUBCUTANEOUS at 23:09

## 2023-08-08 RX ADMIN — GABAPENTIN 100 MILLIGRAM(S): 400 CAPSULE ORAL at 13:23

## 2023-08-08 RX ADMIN — Medication 25 MILLIGRAM(S): at 17:24

## 2023-08-08 RX ADMIN — HYDROMORPHONE HYDROCHLORIDE 0.5 MILLIGRAM(S): 2 INJECTION INTRAMUSCULAR; INTRAVENOUS; SUBCUTANEOUS at 01:42

## 2023-08-08 RX ADMIN — Medication 10 MILLIGRAM(S): at 03:32

## 2023-08-08 RX ADMIN — Medication 100 MILLIEQUIVALENT(S): at 01:42

## 2023-08-08 RX ADMIN — HYDROMORPHONE HYDROCHLORIDE 0.5 MILLIGRAM(S): 2 INJECTION INTRAMUSCULAR; INTRAVENOUS; SUBCUTANEOUS at 02:00

## 2023-08-08 NOTE — OCCUPATIONAL THERAPY INITIAL EVALUATION ADULT - PERTINENT HX OF CURRENT PROBLEM, REHAB EVAL
67 year old male presents for CABG X 4. Pt. with a history of HTN s/p CT angio which revealed CAD, followed by cardiac cath which revealed significant CAD and CABG recommended. Pt. very active, walks 4 miles daily, denies chest pain, denies palpitations denies dizziness and denies dyspnea on exertion. now s/p CABG x 4 (LIMA-LAD, SVG-OM, SVG-Diag, SVG-PDA) 8/7

## 2023-08-08 NOTE — PHYSICAL THERAPY INITIAL EVALUATION ADULT - PERTINENT HX OF CURRENT PROBLEM, REHAB EVAL
67 year old male presents for CABG X 4. Pt. with a history of HTN s/p CT angio which revealed CAD, followed by cardiac cath which revealed significant CAD and CABG recommended. Pt. very active, walks 4 miles daily, denies chest pain, denies palpitations denies dizziness and denies dyspnea on exertion. s/p CABGx4

## 2023-08-08 NOTE — PROGRESS NOTE ADULT - ASSESSMENT
Pt. with a history of HTN s/p CT angio which revealed CAD, followed by cardiac cath which revealed significant CAD and CABG recommended. Pt. very active, walks 4 miles daily, denies chest pain, denies palpitations denies dizziness and denies dyspnea on exertion.  CABG x 4 (LIMA-LAD, SVG-OM, SVG-Diag, Pt. with a history of HTN s/p CT angio which revealed CAD, followed by cardiac cath which revealed significant CAD and CABG recommended. Pt. very active, walks 4 miles daily, denies chest pain, denies palpitations denies dizziness and denies dyspnea on exertion.  8/7 CABG x 4 (LIMA-LAD, SVG-OM, SVG-Diag,  Vasopresin, fluid resuscitation  8/8 Transferred to [facility]

## 2023-08-08 NOTE — OCCUPATIONAL THERAPY INITIAL EVALUATION ADULT - LIVES WITH, PROFILE
pt lives in a private house with spouse and 3 steps to enter. pt has first floor set up and was independent in all ADLs and functional tasks without AE. +drives/spouse

## 2023-08-08 NOTE — CONSULT NOTE ADULT - CRITICAL CARE ATTENDING COMMENT
Upon my evaluation, this patient is at high risk for imminent or life threatening deterioration due to shock and other active medical issues which require my direct attention, intervention, and personal management.  I have personally spent >30 minutes  of critical care time exclusive of time spent on separate billing procedures. This includes review of laboratory data, radiology results, discussion with primary team\patient, and monitoring for potential decompensation. Interventions were performed as documented above.

## 2023-08-08 NOTE — PROGRESS NOTE ADULT - SUBJECTIVE AND OBJECTIVE BOX
CRITICAL CARE ATTENDING - CTICU    MEDICATIONS  (STANDING):  acetaminophen     Tablet .. 650 milliGRAM(s) Oral every 6 hours  ascorbic acid 500 milliGRAM(s) Oral two times a day  aspirin  chewable 81 milliGRAM(s) Oral daily  atorvastatin 80 milliGRAM(s) Oral at bedtime  cefuroxime  IVPB 1500 milliGRAM(s) IV Intermittent every 8 hours  chlorhexidine 2% Cloths 1 Application(s) Topical daily  dexMEDEtomidine Infusion 0.5 MICROgram(s)/kG/Hr (10.6 mL/Hr) IV Continuous <Continuous>  dextrose 50% Injectable 25 milliLiter(s) IV Push every 15 minutes  dextrose 50% Injectable 50 milliLiter(s) IV Push every 15 minutes  gabapentin 100 milliGRAM(s) Oral every 8 hours  insulin regular Infusion 3 Unit(s)/Hr (3 mL/Hr) IV Continuous <Continuous>  metoclopramide Injectable 10 milliGRAM(s) IV Push every 8 hours  metoprolol tartrate 25 milliGRAM(s) Oral every 8 hours  pantoprazole  Injectable 40 milliGRAM(s) IV Push daily  polyethylene glycol 3350 17 Gram(s) Oral daily  potassium chloride  10 mEq/50 mL IVPB 10 milliEquivalent(s) IV Intermittent every 1 hour  potassium chloride  10 mEq/50 mL IVPB 10 milliEquivalent(s) IV Intermittent every 1 hour  potassium chloride  10 mEq/50 mL IVPB 10 milliEquivalent(s) IV Intermittent every 1 hour  senna 2 Tablet(s) Oral at bedtime  sodium chloride 0.9%. 1000 milliLiter(s) (10 mL/Hr) IV Continuous <Continuous>  sodium phosphate 30 milliMole(s)/500 mL IVPB 30 milliMole(s) IV Intermittent once  vasopressin Infusion 0.04 Unit(s)/Min (6 mL/Hr) IV Continuous <Continuous>                                    9.5    5.30  )-----------( 138      ( 08 Aug 2023 00:25 )             27.7       08-08    142  |  108  |  11  ----------------------------<  109<H>  4.0   |  25  |  0.92    Ca    8.3<L>      08 Aug 2023 00:26  Phos  1.9     08-08  Mg     2.4     08-08    TPro  5.3<L>  /  Alb  3.5  /  TBili  0.6  /  DBili  x   /  AST  32  /  ALT  14  /  AlkPhos  33<L>        PT/INR - ( 08 Aug 2023 00:25 )   PT: 11.9 sec;   INR: 1.08 ratio         PTT - ( 08 Aug 2023 00:25 )  PTT:27.0 sec    Mode: CPAP with PS  FiO2: 40  PEEP: 5  PS: 5  MAP: 7  PIP: 11      Daily     Daily Weight in k.5 (08 Aug 2023 00:00)       @ 07:01  -   @ 06:50  --------------------------------------------------------  IN: 2825.3 mL / OUT: 2994 mL / NET: -168.7 mL        Critically Ill patient  : [ ] preoperative ,   [ x] post operative    Requires :  [ x] Arterial Line   [x ] Central Line  [ ] PA catheter  [ ] IABP  [ ] ECMO  [ ] LVAD  [ ] Ventilator  [x ] pacemaker- TPM [ ] Impella.                      [ x] ABG's     [ x] Pulse Oxymetry Monitoring  Bedside evaluation , monitoring , treatment of hemodynamics , fluids , IVP/ IVCD meds.        Diagnosis:     POD 1- CABG x4    Hypovolemia    Thrombocytopenia    ASA/ Lopressor    Chest Tube Drainage/ Management    Temporary pacemaker (TPM) interrogation and setting.     Requires chest PT, pulmonary toilet, suctioning to maintain SaO2,  patent airway and treat atelectasis.    IVCD insulin    Requires bedside physical therapy, mobilization and total [facility]DC care.       I, Neymar Matamoros, personally performed the services described in this documentation. All medical record entries made by the scribe were at my direction and in my presence. I have reviewed the chart and agree that the record reflects my personal performance and is accurate and complete.   Neymar Matamoros MD.       By signing my name below, I, Joseph Greenwood, attest that this documentation has been prepared under the direction and in the presence of Neymar Matamoros MD.   Electronically Signed: Trip Vazquez 23 @ 06:50        Discussed with CT surgeon, Physician Assistant - Nurse Practitioner- Critical care medicine team.   Dicussed at  AM / PM rounds.   Chart, labs , films reviewed.    Cumulative Critical Care Time Given Today:  CRITICAL CARE ATTENDING - CTICU    MEDICATIONS  (STANDING):  acetaminophen     Tablet .. 650 milliGRAM(s) Oral every 6 hours  ascorbic acid 500 milliGRAM(s) Oral two times a day  aspirin  chewable 81 milliGRAM(s) Oral daily  atorvastatin 80 milliGRAM(s) Oral at bedtime  cefuroxime  IVPB 1500 milliGRAM(s) IV Intermittent every 8 hours  chlorhexidine 2% Cloths 1 Application(s) Topical daily  dexMEDEtomidine Infusion 0.5 MICROgram(s)/kG/Hr (10.6 mL/Hr) IV Continuous <Continuous>  dextrose 50% Injectable 25 milliLiter(s) IV Push every 15 minutes  dextrose 50% Injectable 50 milliLiter(s) IV Push every 15 minutes  gabapentin 100 milliGRAM(s) Oral every 8 hours  insulin regular Infusion 3 Unit(s)/Hr (3 mL/Hr) IV Continuous <Continuous>  metoclopramide Injectable 10 milliGRAM(s) IV Push every 8 hours  metoprolol tartrate 25 milliGRAM(s) Oral every 8 hours  pantoprazole  Injectable 40 milliGRAM(s) IV Push daily  polyethylene glycol 3350 17 Gram(s) Oral daily  potassium chloride  10 mEq/50 mL IVPB 10 milliEquivalent(s) IV Intermittent every 1 hour  potassium chloride  10 mEq/50 mL IVPB 10 milliEquivalent(s) IV Intermittent every 1 hour  potassium chloride  10 mEq/50 mL IVPB 10 milliEquivalent(s) IV Intermittent every 1 hour  senna 2 Tablet(s) Oral at bedtime  sodium chloride 0.9%. 1000 milliLiter(s) (10 mL/Hr) IV Continuous <Continuous>  sodium phosphate 30 milliMole(s)/500 mL IVPB 30 milliMole(s) IV Intermittent once  vasopressin Infusion 0.04 Unit(s)/Min (6 mL/Hr) IV Continuous <Continuous>                                    9.5    5.30  )-----------( 138      ( 08 Aug 2023 00:25 )             27.7       08-08    142  |  108  |  11  ----------------------------<  109<H>  4.0   |  25  |  0.92    Ca    8.3<L>      08 Aug 2023 00:26  Phos  1.9     08-08  Mg     2.4     08-08    TPro  5.3<L>  /  Alb  3.5  /  TBili  0.6  /  DBili  x   /  AST  32  /  ALT  14  /  AlkPhos  33<L>        PT/INR - ( 08 Aug 2023 00:25 )   PT: 11.9 sec;   INR: 1.08 ratio         PTT - ( 08 Aug 2023 00:25 )  PTT:27.0 sec    Mode: CPAP with PS  FiO2: 40  PEEP: 5  PS: 5  MAP: 7  PIP: 11      Daily     Daily Weight in k.5 (08 Aug 2023 00:00)       @ 07:01  -   @ 06:50  --------------------------------------------------------  IN: 2825.3 mL / OUT: 2994 mL / NET: -168.7 mL        Critically Ill patient  : [ ] preoperative ,   [ x] post operative    Requires :  [ x] Arterial Line   [x ] Central Line  [ ] PA catheter  [ ] IABP  [ ] ECMO  [ ] LVAD  [ ] Ventilator  [x ] pacemaker- TPM [ ] Impella.                      [ x] ABG's     [ x] Pulse Oxymetry Monitoring  Bedside evaluation , monitoring , treatment of hemodynamics , fluids , IVP/ IVCD meds.        Diagnosis:     POD 1- CABG x4 L    Hypotension     Hypovolemia    Hemodynamic lability,  instability. Requires IVCD [ x] vasopressors - on/off  [ ] inotropes  [ ] vasodilator  [ x]IVSS fluid  to maintain MAP, perfusion, C.I.     Requires chest PT, pulmonary toilet, suctioning to maintain SaO2,  patent airway and treat atelectasis.     Thrombocytopenia    ASA/ Lopressor    Chest Tube Drainage/ Management    Temporary pacemaker (TPM) interrogation and setting.     Requires chest PT, pulmonary toilet, suctioning to maintain SaO2,  patent airway and treat atelectasis.    IVCD insulin    Requires bedside physical therapy, mobilization and total nursing home care.       I, Neymar Matamoros, personally performed the services described in this documentation. All medical record entries made by the scribe were at my direction and in my presence. I have reviewed the chart and agree that the record reflects my personal performance and is accurate and complete.   Neymar Matamoros MD.       By signing my name below, I, Joseph Greenwood, attest that this documentation has been prepared under the direction and in the presence of Neymar Matamoros MD.   Electronically Signed: Trip Vazquez 23 @ 06:50        Discussed with CT surgeon, Physician Assistant - Nurse Practitioner- Critical care medicine team.   Dicussed at  AM / PM rounds.   Chart, labs , films reviewed.    Cumulative Critical Care Time Given Today:  30 min

## 2023-08-08 NOTE — PHYSICAL THERAPY INITIAL EVALUATION ADULT - GENERAL OBSERVATIONS, REHAB EVAL
pt recd sitting in chair (+)ERON to left LE (+)R IJ (+)external pacer (+)brown (+)chest tube x 2 (+)tele (+)pulse ox (+)o2 via nc @6L (+)a-line

## 2023-08-08 NOTE — PHYSICAL THERAPY INITIAL EVALUATION ADULT - ACTIVE RANGE OF MOTION EXAMINATION, REHAB EVAL
sternal precautions/bilateral upper extremity Active ROM was WFL (within functional limits)/bilateral  lower extremity Active ROM was WFL (within functional limits)

## 2023-08-08 NOTE — PHYSICAL THERAPY INITIAL EVALUATION ADULT - MUSCLE TONE ASSESSMENT, REHAB EVAL
Pre-Visit Chart Review  For Appointment Scheduled on 05/10/2018    There are no preventive care reminders to display for this patient.                   normal

## 2023-08-08 NOTE — PHYSICAL THERAPY INITIAL EVALUATION ADULT - ADDITIONAL COMMENTS
pt lives in private house with wife, 2 steps to enter,  first floor set. pt independent with mobility and did not use assistive device

## 2023-08-08 NOTE — PROGRESS NOTE ADULT - SUBJECTIVE AND OBJECTIVE BOX
VITAL SIGNS    Telemetry:      Vital Signs Last 24 Hrs  T(C): 36.7 (23 @ 14:31), Max: 43 (23 @ 20:00)  T(F): 98.1 (23 @ 14:31), Max: 109.4 (23 @ 20:00)  HR: 77 (23 @ 14:31) (72 - 87)  BP: 118/73 (23 @ 14:31) (104/55 - 118/73)  RR: 19 (23 @ 14:31) (0 - 31)  SpO2: 98% (23 @ 14:31) (90% - 100%)                    @ 07:  -   @ 07:00  --------------------------------------------------------  IN: 2825.3 mL / OUT: 3034 mL / NET: -208.7 mL     @ 07:01  -   @ 15:37  --------------------------------------------------------  IN: 755 mL / OUT: 460 mL / NET: 295 mL          Daily     Daily Weight in k.5 (08 Aug 2023 00:00)            CAPILLARY BLOOD GLUCOSE  146 (08 Aug 2023 08:00)  145 (08 Aug 2023 07:00)  143 (08 Aug 2023 06:00)  131 (08 Aug 2023 05:00)  127 (08 Aug 2023 03:00)  96 (08 Aug 2023 02:00)  107 (08 Aug 2023 00:00)  122 (07 Aug 2023 23:00)  134 (07 Aug 2023 22:00)  180 (07 Aug 2023 21:00)  174 (07 Aug 2023 20:00)  150 (07 Aug 2023 19:00)  147 (07 Aug 2023 18:00)  140 (07 Aug 2023 16:30)      POCT Blood Glucose.: 198 mg/dL (08 Aug 2023 11:16)  POCT Blood Glucose.: 146 mg/dL (08 Aug 2023 07:32)  POCT Blood Glucose.: 145 mg/dL (08 Aug 2023 06:45)  POCT Blood Glucose.: 143 mg/dL (08 Aug 2023 05:50)  POCT Blood Glucose.: 131 mg/dL (08 Aug 2023 05:10)  POCT Blood Glucose.: 128 mg/dL (08 Aug 2023 04:13)  POCT Blood Glucose.: 127 mg/dL (08 Aug 2023 02:49)  POCT Blood Glucose.: 96 mg/dL (08 Aug 2023 01:52)  POCT Blood Glucose.: 107 mg/dL (07 Aug 2023 23:48)  POCT Blood Glucose.: 122 mg/dL (07 Aug 2023 23:07)  POCT Blood Glucose.: 138 mg/dL (07 Aug 2023 21:57)  POCT Blood Glucose.: 180 mg/dL (07 Aug 2023 20:47)  POCT Blood Glucose.: 174 mg/dL (07 Aug 2023 20:01)  POCT Blood Glucose.: 144 mg/dL (07 Aug 2023 16:25)            Drains:     MS         [  ] Drainage:                 L Pleural  [  ]  Drainage:                R Pleural  [  ]  Drainage:    Pacing Wires        [  ]   Settings:                                  Isolated  [  ]    Coumadin    [ ] YES          [  ]      NO                                   PHYSICAL EXAM        Neurology: alert and oriented x 3, nonfocal, no gross deficits  CV : s1 s2 RRR  Sternal Wound :  CDI , Stable  Lungs: cta  Abdomen: soft, nontender, nondistended, positive bowel sounds, last bowel movement                       chest tubes  :    voiding / brown - sbd         Extremities:      edema   /  -   calve tenderness ,    L leg  /  R leg  incisions cdi          acetaminophen     Tablet .. 650 milliGRAM(s) Oral every 6 hours  ascorbic acid 500 milliGRAM(s) Oral two times a day  aspirin  chewable 81 milliGRAM(s) Oral daily  atorvastatin 80 milliGRAM(s) Oral at bedtime  cefuroxime  IVPB 1500 milliGRAM(s) IV Intermittent every 8 hours  chlorhexidine 2% Cloths 1 Application(s) Topical daily  dexMEDEtomidine Infusion 0.5 MICROgram(s)/kG/Hr IV Continuous <Continuous>  dextrose 50% Injectable 25 milliLiter(s) IV Push every 15 minutes  dextrose 50% Injectable 50 milliLiter(s) IV Push every 15 minutes  gabapentin 100 milliGRAM(s) Oral every 8 hours  HYDROmorphone  Injectable 0.5 milliGRAM(s) IV Push every 6 hours PRN  insulin lispro (ADMELOG) corrective regimen sliding scale   SubCutaneous Before meals and at bedtime  insulin regular Infusion 3 Unit(s)/Hr IV Continuous <Continuous>  metoclopramide Injectable 10 milliGRAM(s) IV Push every 8 hours  metoprolol tartrate 25 milliGRAM(s) Oral every 8 hours  oxyCODONE    IR 5 milliGRAM(s) Oral every 4 hours PRN  oxyCODONE    IR 10 milliGRAM(s) Oral every 4 hours PRN  pantoprazole  Injectable 40 milliGRAM(s) IV Push daily  polyethylene glycol 3350 17 Gram(s) Oral daily  potassium chloride  10 mEq/50 mL IVPB 10 milliEquivalent(s) IV Intermittent every 1 hour  potassium chloride  10 mEq/50 mL IVPB 10 milliEquivalent(s) IV Intermittent every 1 hour  potassium chloride  10 mEq/50 mL IVPB 10 milliEquivalent(s) IV Intermittent every 1 hour  senna 2 Tablet(s) Oral at bedtime  sodium chloride 0.9%. 1000 milliLiter(s) IV Continuous <Continuous>  vasopressin Infusion 0.04 Unit(s)/Min IV Continuous <Continuous>                    Physical Therapy Rec:   Home  [  ]   Home w/ PT  [  ]  Rehab  [  ]  Discussed with Cardiothoracic Team at AM rounds.     VITAL SIGNS    Telemetry:  nsr    Vital Signs Last 24 Hrs  T(C): 36.7 (23 @ 14:31), Max: 43 (23 @ 20:00)  T(F): 98.1 (23 @ 14:31), Max: 109.4 (23 @ 20:00)  HR: 77 (23 @ 14:31) (72 - 87)  BP: 118/73 (23 @ 14:31) (104/55 - 118/73)  RR: 19 (23 @ 14:31) (0 - 31)  SpO2: 98% (23 @ 14:31) (90% - 100%)                    @ 07:01  -   @ 07:00  --------------------------------------------------------  IN: 2825.3 mL / OUT: 3034 mL / NET: -208.7 mL     @ 07:01  -   @ 15:37  --------------------------------------------------------  IN: 755 mL / OUT: 460 mL / NET: 295 mL          Daily     Daily Weight in k.5 (08 Aug 2023 00:00)            CAPILLARY BLOOD GLUCOSE  146 (08 Aug 2023 08:00)  145 (08 Aug 2023 07:00)  143 (08 Aug 2023 06:00)  131 (08 Aug 2023 05:00)  127 (08 Aug 2023 03:00)  96 (08 Aug 2023 02:00)  107 (08 Aug 2023 00:00)  122 (07 Aug 2023 23:00)  134 (07 Aug 2023 22:00)  180 (07 Aug 2023 21:00)  174 (07 Aug 2023 20:00)  150 (07 Aug 2023 19:00)  147 (07 Aug 2023 18:00)  140 (07 Aug 2023 16:30)      POCT Blood Glucose.: 198 mg/dL (08 Aug 2023 11:16)  POCT Blood Glucose.: 146 mg/dL (08 Aug 2023 07:32)  POCT Blood Glucose.: 145 mg/dL (08 Aug 2023 06:45)  POCT Blood Glucose.: 143 mg/dL (08 Aug 2023 05:50)  POCT Blood Glucose.: 131 mg/dL (08 Aug 2023 05:10)  POCT Blood Glucose.: 128 mg/dL (08 Aug 2023 04:13)  POCT Blood Glucose.: 127 mg/dL (08 Aug 2023 02:49)  POCT Blood Glucose.: 96 mg/dL (08 Aug 2023 01:52)  POCT Blood Glucose.: 107 mg/dL (07 Aug 2023 23:48)  POCT Blood Glucose.: 122 mg/dL (07 Aug 2023 23:07)  POCT Blood Glucose.: 138 mg/dL (07 Aug 2023 21:57)  POCT Blood Glucose.: 180 mg/dL (07 Aug 2023 20:47)  POCT Blood Glucose.: 174 mg/dL (07 Aug 2023 20:01)  POCT Blood Glucose.: 144 mg/dL (07 Aug 2023 16:25)            Drains:     MS         [  ] Drainage:                 L Pleural  [  ]  Drainage:                R Pleural  [  ]  Drainage:    Pacing Wires        [  ]   Settings:                                  Isolated  [  ]    Coumadin    [ ] YES          [  ]      NO                                   PHYSICAL EXAM        Neurology: alert and oriented x 3, nonfocal, no gross deficits  CV : s1 s2 RRR  ms ct  l pl  Sternal Wound :  CDI , Stable  Lungs: cta  Abdomen: soft, nontender, nondistended, positive bowel sounds, last bowel movement -                      chest tubes x 3  :         Extremities:     - edema   /  -   calve tenderness ,    L leg  /  R leg  incisions cdi          acetaminophen     Tablet .. 650 milliGRAM(s) Oral every 6 hours  ascorbic acid 500 milliGRAM(s) Oral two times a day  aspirin  chewable 81 milliGRAM(s) Oral daily  atorvastatin 80 milliGRAM(s) Oral at bedtime  cefuroxime  IVPB 1500 milliGRAM(s) IV Intermittent every 8 hours  chlorhexidine 2% Cloths 1 Application(s) Topical daily  dexMEDEtomidine Infusion 0.5 MICROgram(s)/kG/Hr IV Continuous <Continuous>  dextrose 50% Injectable 25 milliLiter(s) IV Push every 15 minutes  dextrose 50% Injectable 50 milliLiter(s) IV Push every 15 minutes  gabapentin 100 milliGRAM(s) Oral every 8 hours  HYDROmorphone  Injectable 0.5 milliGRAM(s) IV Push every 6 hours PRN  insulin lispro (ADMELOG) corrective regimen sliding scale   SubCutaneous Before meals and at bedtime  insulin regular Infusion 3 Unit(s)/Hr IV Continuous <Continuous>  metoclopramide Injectable 10 milliGRAM(s) IV Push every 8 hours  metoprolol tartrate 25 milliGRAM(s) Oral every 8 hours  oxyCODONE    IR 5 milliGRAM(s) Oral every 4 hours PRN  oxyCODONE    IR 10 milliGRAM(s) Oral every 4 hours PRN  pantoprazole  Injectable 40 milliGRAM(s) IV Push daily  polyethylene glycol 3350 17 Gram(s) Oral daily  potassium chloride  10 mEq/50 mL IVPB 10 milliEquivalent(s) IV Intermittent every 1 hour  potassium chloride  10 mEq/50 mL IVPB 10 milliEquivalent(s) IV Intermittent every 1 hour  potassium chloride  10 mEq/50 mL IVPB 10 milliEquivalent(s) IV Intermittent every 1 hour  senna 2 Tablet(s) Oral at bedtime  sodium chloride 0.9%. 1000 milliLiter(s) IV Continuous <Continuous>  vasopressin Infusion 0.04 Unit(s)/Min IV Continuous <Continuous>                    Physical Therapy Rec:   Home  [  ]   Home w/ PT  [  ]  Rehab  [  ]  Discussed with Cardiothoracic Team at AM rounds.

## 2023-08-09 LAB
ALBUMIN SERPL ELPH-MCNC: 3.8 G/DL — SIGNIFICANT CHANGE UP (ref 3.3–5)
ALP SERPL-CCNC: 36 U/L — LOW (ref 40–120)
ALT FLD-CCNC: 14 U/L — SIGNIFICANT CHANGE UP (ref 10–45)
ANION GAP SERPL CALC-SCNC: 12 MMOL/L — SIGNIFICANT CHANGE UP (ref 5–17)
AST SERPL-CCNC: 20 U/L — SIGNIFICANT CHANGE UP (ref 10–40)
BASE EXCESS BLDV CALC-SCNC: 0.8 MMOL/L — SIGNIFICANT CHANGE UP (ref -2–3)
BASOPHILS # BLD AUTO: 0.02 K/UL — SIGNIFICANT CHANGE UP (ref 0–0.2)
BASOPHILS NFR BLD AUTO: 0.2 % — SIGNIFICANT CHANGE UP (ref 0–2)
BILIRUB SERPL-MCNC: 0.5 MG/DL — SIGNIFICANT CHANGE UP (ref 0.2–1.2)
BUN SERPL-MCNC: 23 MG/DL — SIGNIFICANT CHANGE UP (ref 7–23)
CALCIUM SERPL-MCNC: 8.5 MG/DL — SIGNIFICANT CHANGE UP (ref 8.4–10.5)
CHLORIDE SERPL-SCNC: 98 MMOL/L — SIGNIFICANT CHANGE UP (ref 96–108)
CO2 BLDV-SCNC: 27 MMOL/L — HIGH (ref 22–26)
CO2 SERPL-SCNC: 24 MMOL/L — SIGNIFICANT CHANGE UP (ref 22–31)
CREAT SERPL-MCNC: 1.21 MG/DL — SIGNIFICANT CHANGE UP (ref 0.5–1.3)
EGFR: 65 ML/MIN/1.73M2 — SIGNIFICANT CHANGE UP
EOSINOPHIL # BLD AUTO: 0.02 K/UL — SIGNIFICANT CHANGE UP (ref 0–0.5)
EOSINOPHIL NFR BLD AUTO: 0.2 % — SIGNIFICANT CHANGE UP (ref 0–6)
GAS PNL BLDV: SIGNIFICANT CHANGE UP
GAS PNL BLDV: SIGNIFICANT CHANGE UP
GLUCOSE SERPL-MCNC: 164 MG/DL — HIGH (ref 70–99)
HCO3 BLDV-SCNC: 26 MMOL/L — SIGNIFICANT CHANGE UP (ref 22–29)
HCT VFR BLD CALC: 30.8 % — LOW (ref 39–50)
HGB BLD-MCNC: 10.3 G/DL — LOW (ref 13–17)
IMM GRANULOCYTES NFR BLD AUTO: 0.5 % — SIGNIFICANT CHANGE UP (ref 0–0.9)
LACTATE BLDV-MCNC: 2.6 MMOL/L — HIGH (ref 0.5–2)
LYMPHOCYTES # BLD AUTO: 0.92 K/UL — LOW (ref 1–3.3)
LYMPHOCYTES # BLD AUTO: 8.2 % — LOW (ref 13–44)
MAGNESIUM SERPL-MCNC: 2.4 MG/DL — SIGNIFICANT CHANGE UP (ref 1.6–2.6)
MAGNESIUM SERPL-MCNC: 2.4 MG/DL — SIGNIFICANT CHANGE UP (ref 1.6–2.6)
MCHC RBC-ENTMCNC: 31.7 PG — SIGNIFICANT CHANGE UP (ref 27–34)
MCHC RBC-ENTMCNC: 33.4 GM/DL — SIGNIFICANT CHANGE UP (ref 32–36)
MCV RBC AUTO: 94.8 FL — SIGNIFICANT CHANGE UP (ref 80–100)
MONOCYTES # BLD AUTO: 1.29 K/UL — HIGH (ref 0–0.9)
MONOCYTES NFR BLD AUTO: 11.4 % — SIGNIFICANT CHANGE UP (ref 2–14)
NEUTROPHILS # BLD AUTO: 8.97 K/UL — HIGH (ref 1.8–7.4)
NEUTROPHILS NFR BLD AUTO: 79.5 % — HIGH (ref 43–77)
NRBC # BLD: 0 /100 WBCS — SIGNIFICANT CHANGE UP (ref 0–0)
PCO2 BLDV: 43 MMHG — SIGNIFICANT CHANGE UP (ref 42–55)
PH BLDV: 7.39 — SIGNIFICANT CHANGE UP (ref 7.32–7.43)
PHOSPHATE SERPL-MCNC: 2.5 MG/DL — SIGNIFICANT CHANGE UP (ref 2.5–4.5)
PLATELET # BLD AUTO: 159 K/UL — SIGNIFICANT CHANGE UP (ref 150–400)
PO2 BLDV: 47 MMHG — HIGH (ref 25–45)
POTASSIUM SERPL-MCNC: 4.5 MMOL/L — SIGNIFICANT CHANGE UP (ref 3.5–5.3)
POTASSIUM SERPL-SCNC: 4.5 MMOL/L — SIGNIFICANT CHANGE UP (ref 3.5–5.3)
PROT SERPL-MCNC: 5.9 G/DL — LOW (ref 6–8.3)
RBC # BLD: 3.25 M/UL — LOW (ref 4.2–5.8)
RBC # FLD: 14.3 % — SIGNIFICANT CHANGE UP (ref 10.3–14.5)
SAO2 % BLDV: 81.1 % — SIGNIFICANT CHANGE UP (ref 67–88)
SODIUM SERPL-SCNC: 134 MMOL/L — LOW (ref 135–145)
WBC # BLD: 11.28 K/UL — HIGH (ref 3.8–10.5)
WBC # FLD AUTO: 11.28 K/UL — HIGH (ref 3.8–10.5)

## 2023-08-09 PROCEDURE — 99223 1ST HOSP IP/OBS HIGH 75: CPT

## 2023-08-09 PROCEDURE — 71045 X-RAY EXAM CHEST 1 VIEW: CPT | Mod: 26

## 2023-08-09 PROCEDURE — 93010 ELECTROCARDIOGRAM REPORT: CPT | Mod: 76

## 2023-08-09 PROCEDURE — 99233 SBSQ HOSP IP/OBS HIGH 50: CPT

## 2023-08-09 PROCEDURE — 71045 X-RAY EXAM CHEST 1 VIEW: CPT | Mod: 26,77

## 2023-08-09 RX ORDER — ACETAMINOPHEN 500 MG
1000 TABLET ORAL ONCE
Refills: 0 | Status: COMPLETED | OUTPATIENT
Start: 2023-08-09 | End: 2023-08-09

## 2023-08-09 RX ORDER — SPIRONOLACTONE 25 MG/1
50 TABLET, FILM COATED ORAL
Refills: 0 | Status: DISCONTINUED | OUTPATIENT
Start: 2023-08-09 | End: 2023-08-09

## 2023-08-09 RX ORDER — AMIODARONE HYDROCHLORIDE 400 MG/1
200 TABLET ORAL DAILY
Refills: 0 | Status: DISCONTINUED | OUTPATIENT
Start: 2023-08-13 | End: 2023-08-13

## 2023-08-09 RX ORDER — METOPROLOL TARTRATE 50 MG
12.5 TABLET ORAL ONCE
Refills: 0 | Status: COMPLETED | OUTPATIENT
Start: 2023-08-09 | End: 2023-08-09

## 2023-08-09 RX ORDER — POTASSIUM CHLORIDE 20 MEQ
20 PACKET (EA) ORAL
Refills: 0 | Status: COMPLETED | OUTPATIENT
Start: 2023-08-09 | End: 2023-08-09

## 2023-08-09 RX ORDER — METOPROLOL TARTRATE 50 MG
2.5 TABLET ORAL EVERY 6 HOURS
Refills: 0 | Status: DISCONTINUED | OUTPATIENT
Start: 2023-08-09 | End: 2023-08-09

## 2023-08-09 RX ORDER — METOPROLOL TARTRATE 50 MG
5 TABLET ORAL ONCE
Refills: 0 | Status: COMPLETED | OUTPATIENT
Start: 2023-08-09 | End: 2023-08-09

## 2023-08-09 RX ORDER — METOPROLOL TARTRATE 50 MG
50 TABLET ORAL EVERY 8 HOURS
Refills: 0 | Status: DISCONTINUED | OUTPATIENT
Start: 2023-08-09 | End: 2023-08-15

## 2023-08-09 RX ORDER — FUROSEMIDE 40 MG
20 TABLET ORAL ONCE
Refills: 0 | Status: COMPLETED | OUTPATIENT
Start: 2023-08-09 | End: 2023-08-09

## 2023-08-09 RX ORDER — APIXABAN 2.5 MG/1
2.5 TABLET, FILM COATED ORAL
Refills: 0 | Status: DISCONTINUED | OUTPATIENT
Start: 2023-08-10 | End: 2023-08-12

## 2023-08-09 RX ORDER — METOPROLOL TARTRATE 50 MG
2.5 TABLET ORAL ONCE
Refills: 0 | Status: COMPLETED | OUTPATIENT
Start: 2023-08-09 | End: 2023-08-09

## 2023-08-09 RX ORDER — LANOLIN ALCOHOL/MO/W.PET/CERES
5 CREAM (GRAM) TOPICAL AT BEDTIME
Refills: 0 | Status: DISCONTINUED | OUTPATIENT
Start: 2023-08-09 | End: 2023-08-15

## 2023-08-09 RX ORDER — SPIRONOLACTONE 25 MG/1
50 TABLET, FILM COATED ORAL
Refills: 0 | Status: DISCONTINUED | OUTPATIENT
Start: 2023-08-09 | End: 2023-08-15

## 2023-08-09 RX ORDER — SODIUM,POTASSIUM PHOSPHATES 278-250MG
1 POWDER IN PACKET (EA) ORAL
Refills: 0 | Status: COMPLETED | OUTPATIENT
Start: 2023-08-09 | End: 2023-08-12

## 2023-08-09 RX ORDER — APIXABAN 2.5 MG/1
1 TABLET, FILM COATED ORAL
Qty: 60 | Refills: 2
Start: 2023-08-09 | End: 2023-11-06

## 2023-08-09 RX ORDER — AMIODARONE HYDROCHLORIDE 400 MG/1
TABLET ORAL
Refills: 0 | Status: DISCONTINUED | OUTPATIENT
Start: 2023-08-09 | End: 2023-08-13

## 2023-08-09 RX ORDER — AMIODARONE HYDROCHLORIDE 400 MG/1
400 TABLET ORAL EVERY 8 HOURS
Refills: 0 | Status: COMPLETED | OUTPATIENT
Start: 2023-08-09 | End: 2023-08-12

## 2023-08-09 RX ORDER — MAGNESIUM SULFATE 500 MG/ML
2 VIAL (ML) INJECTION ONCE
Refills: 0 | Status: COMPLETED | OUTPATIENT
Start: 2023-08-09 | End: 2023-08-09

## 2023-08-09 RX ORDER — PSYLLIUM SEED (WITH DEXTROSE)
1 POWDER (GRAM) ORAL DAILY
Refills: 0 | Status: DISCONTINUED | OUTPATIENT
Start: 2023-08-09 | End: 2023-08-15

## 2023-08-09 RX ORDER — AMIODARONE HYDROCHLORIDE 400 MG/1
400 TABLET ORAL ONCE
Refills: 0 | Status: COMPLETED | OUTPATIENT
Start: 2023-08-09 | End: 2023-08-09

## 2023-08-09 RX ADMIN — Medication 2.5 MILLIGRAM(S): at 09:10

## 2023-08-09 RX ADMIN — HYDROMORPHONE HYDROCHLORIDE 2 MILLIGRAM(S): 2 INJECTION INTRAMUSCULAR; INTRAVENOUS; SUBCUTANEOUS at 14:28

## 2023-08-09 RX ADMIN — HYDROMORPHONE HYDROCHLORIDE 0.5 MILLIGRAM(S): 2 INJECTION INTRAMUSCULAR; INTRAVENOUS; SUBCUTANEOUS at 03:58

## 2023-08-09 RX ADMIN — Medication 400 MILLIGRAM(S): at 09:02

## 2023-08-09 RX ADMIN — Medication 10 MILLIGRAM(S): at 21:19

## 2023-08-09 RX ADMIN — HYDROMORPHONE HYDROCHLORIDE 4 MILLIGRAM(S): 2 INJECTION INTRAMUSCULAR; INTRAVENOUS; SUBCUTANEOUS at 08:19

## 2023-08-09 RX ADMIN — Medication 5 MILLIGRAM(S): at 16:37

## 2023-08-09 RX ADMIN — Medication 1 PACKET(S): at 06:23

## 2023-08-09 RX ADMIN — Medication 81 MILLIGRAM(S): at 11:18

## 2023-08-09 RX ADMIN — Medication 1000 MILLIGRAM(S): at 09:32

## 2023-08-09 RX ADMIN — PANTOPRAZOLE SODIUM 40 MILLIGRAM(S): 20 TABLET, DELAYED RELEASE ORAL at 11:19

## 2023-08-09 RX ADMIN — Medication 2.5 MILLIGRAM(S): at 01:57

## 2023-08-09 RX ADMIN — Medication 1 PACKET(S): at 17:02

## 2023-08-09 RX ADMIN — Medication 1: at 11:19

## 2023-08-09 RX ADMIN — Medication 20 MILLIEQUIVALENT(S): at 04:06

## 2023-08-09 RX ADMIN — GABAPENTIN 100 MILLIGRAM(S): 400 CAPSULE ORAL at 21:17

## 2023-08-09 RX ADMIN — Medication 25 MILLIGRAM(S): at 06:05

## 2023-08-09 RX ADMIN — SENNA PLUS 2 TABLET(S): 8.6 TABLET ORAL at 21:17

## 2023-08-09 RX ADMIN — HYDROMORPHONE HYDROCHLORIDE 4 MILLIGRAM(S): 2 INJECTION INTRAMUSCULAR; INTRAVENOUS; SUBCUTANEOUS at 08:49

## 2023-08-09 RX ADMIN — Medication 50 MILLIGRAM(S): at 13:58

## 2023-08-09 RX ADMIN — Medication 500 MILLIGRAM(S): at 17:02

## 2023-08-09 RX ADMIN — HYDROMORPHONE HYDROCHLORIDE 0.5 MILLIGRAM(S): 2 INJECTION INTRAMUSCULAR; INTRAVENOUS; SUBCUTANEOUS at 17:33

## 2023-08-09 RX ADMIN — AMIODARONE HYDROCHLORIDE 400 MILLIGRAM(S): 400 TABLET ORAL at 04:42

## 2023-08-09 RX ADMIN — Medication 25 GRAM(S): at 03:52

## 2023-08-09 RX ADMIN — Medication 20 MILLIGRAM(S): at 03:52

## 2023-08-09 RX ADMIN — SPIRONOLACTONE 50 MILLIGRAM(S): 25 TABLET, FILM COATED ORAL at 11:18

## 2023-08-09 RX ADMIN — Medication 2.5 MILLIGRAM(S): at 15:51

## 2023-08-09 RX ADMIN — AMIODARONE HYDROCHLORIDE 400 MILLIGRAM(S): 400 TABLET ORAL at 13:57

## 2023-08-09 RX ADMIN — Medication 1: at 07:48

## 2023-08-09 RX ADMIN — Medication 100 MILLIGRAM(S): at 05:48

## 2023-08-09 RX ADMIN — SPIRONOLACTONE 50 MILLIGRAM(S): 25 TABLET, FILM COATED ORAL at 19:54

## 2023-08-09 RX ADMIN — Medication 20 MILLIGRAM(S): at 11:18

## 2023-08-09 RX ADMIN — Medication 50 MILLIGRAM(S): at 21:17

## 2023-08-09 RX ADMIN — HYDROMORPHONE HYDROCHLORIDE 4 MILLIGRAM(S): 2 INJECTION INTRAMUSCULAR; INTRAVENOUS; SUBCUTANEOUS at 03:40

## 2023-08-09 RX ADMIN — AMIODARONE HYDROCHLORIDE 400 MILLIGRAM(S): 400 TABLET ORAL at 21:17

## 2023-08-09 RX ADMIN — Medication 12.5 MILLIGRAM(S): at 00:34

## 2023-08-09 RX ADMIN — POLYETHYLENE GLYCOL 3350 17 GRAM(S): 17 POWDER, FOR SOLUTION ORAL at 11:18

## 2023-08-09 RX ADMIN — Medication 10 MILLIGRAM(S): at 13:58

## 2023-08-09 RX ADMIN — GABAPENTIN 100 MILLIGRAM(S): 400 CAPSULE ORAL at 05:50

## 2023-08-09 RX ADMIN — Medication 1: at 21:14

## 2023-08-09 RX ADMIN — Medication 20 MILLIEQUIVALENT(S): at 05:50

## 2023-08-09 RX ADMIN — HYDROMORPHONE HYDROCHLORIDE 2 MILLIGRAM(S): 2 INJECTION INTRAMUSCULAR; INTRAVENOUS; SUBCUTANEOUS at 13:58

## 2023-08-09 RX ADMIN — SPIRONOLACTONE 50 MILLIGRAM(S): 25 TABLET, FILM COATED ORAL at 05:52

## 2023-08-09 RX ADMIN — Medication 650 MILLIGRAM(S): at 23:32

## 2023-08-09 RX ADMIN — CHLORHEXIDINE GLUCONATE 1 APPLICATION(S): 213 SOLUTION TOPICAL at 11:12

## 2023-08-09 RX ADMIN — Medication 650 MILLIGRAM(S): at 05:51

## 2023-08-09 RX ADMIN — Medication 650 MILLIGRAM(S): at 11:18

## 2023-08-09 RX ADMIN — HYDROMORPHONE HYDROCHLORIDE 0.5 MILLIGRAM(S): 2 INJECTION INTRAMUSCULAR; INTRAVENOUS; SUBCUTANEOUS at 03:28

## 2023-08-09 RX ADMIN — Medication 500 MILLIGRAM(S): at 05:50

## 2023-08-09 RX ADMIN — GABAPENTIN 100 MILLIGRAM(S): 400 CAPSULE ORAL at 13:58

## 2023-08-09 RX ADMIN — Medication 2.5 MILLIGRAM(S): at 15:13

## 2023-08-09 RX ADMIN — Medication 650 MILLIGRAM(S): at 17:01

## 2023-08-09 RX ADMIN — Medication 10 MILLIGRAM(S): at 05:54

## 2023-08-09 RX ADMIN — HYDROMORPHONE HYDROCHLORIDE 0.5 MILLIGRAM(S): 2 INJECTION INTRAMUSCULAR; INTRAVENOUS; SUBCUTANEOUS at 17:03

## 2023-08-09 RX ADMIN — ATORVASTATIN CALCIUM 80 MILLIGRAM(S): 80 TABLET, FILM COATED ORAL at 21:17

## 2023-08-09 RX ADMIN — HYDROMORPHONE HYDROCHLORIDE 4 MILLIGRAM(S): 2 INJECTION INTRAMUSCULAR; INTRAVENOUS; SUBCUTANEOUS at 03:10

## 2023-08-09 RX ADMIN — Medication 1 PACKET(S): at 21:18

## 2023-08-09 NOTE — PROGRESS NOTE ADULT - ASSESSMENT
Pt. with a history of HTN s/p CT angio which revealed CAD, followed by cardiac cath which revealed significant CAD and CABG recommended. Pt. very active, walks 4 miles daily, denies chest pain, denies palpitations denies dizziness and denies dyspnea on exertion.  8/7 CABG x 4 (LIMA-LAD, SVG-OM, SVG-Diag,  Vasopresin, fluid resuscitation  8/8 Transferred to [facility]  8/9 Rapid afib since last luis.  Amio load, increase lopressor.  Diuresis ,   Maintain ct   F/u lactate.

## 2023-08-09 NOTE — PROGRESS NOTE ADULT - ASSESSMENT
67 year old male PMH HTN, non-Hodgkin's lymphoma, SVT, BPH who presented after CT angio revealed 3vCAD, followed by cardiac cath which revealed significant severe 3vCAD who was sent to [facility] for planned CABG.   He is now POD 1, CABG x4 (LIMA-LAD, SVG-OM, SVG-Diag, SVG-PDA) now with post op afib.     #3vCAD s/p CABG:  - Continue ASA, high intensity statin  - Continue Lopressor 25mg Q8H, can increase to Q6H if needed  - Pressors currently off at the bedside   - Chest tube mgmt as per CTS, still with 1 in place  - Started on Torsemide, Aldactone  - H+H stable this AM  - Keep K>4, Mg>2    #Post op afib, CHADSVASc 3 (age, CAD, HTN). Patient has signed consent for PACeS trial, to be randomized today or tomorrow  - To be randomized to receive AC vs ASA  - Continue Lopressor as above. Can increase to 25mg Q6H if further rate control required and BP can tolerate   - TSH is borderline low at 0.26. Would obtain free T4 to ensure this is not elevated and not a cause of his afib  - On Amio load. Obtain free T4 as above, especially given patient was started on Amiodarone load     67 year old male PMH HTN, non-Hodgkin's lymphoma, SVT, BPH who presented after CT angio revealed 3vCAD, followed by cardiac cath which revealed significant severe 3vCAD who was sent to [facility] for planned CABG.   He is now POD 1, CABG x4 (LIMA-LAD, SVG-OM, SVG-Diag, SVG-PDA) now with post op afib.     #3vCAD s/p CABG:  - Continue ASA, high intensity statin  - Continue Lopressor 25mg Q8H, can increase to Q6H if needed  - Pressors currently off at the bedside   - Chest tube mgmt as per CTS, still with 1 in place  - Started on Torsemide, Aldactone  - Incentive spirometry encouraged   - H+H stable this AM  - Keep K>4, Mg>2    #Post op afib, CHADSVASc 3 (age, CAD, HTN). Patient has signed consent for PACeS trial, to be randomized today or tomorrow  - To be randomized to receive AC vs ASA  - Continue Lopressor as above. Can increase to 25mg Q6H if further rate control required and BP can tolerate   - TSH is borderline low at 0.26. Would obtain free T4 to ensure this is not elevated and not a cause of his afib  - On Amio load. Obtain free T4 as above, especially given patient was started on Amiodarone load

## 2023-08-09 NOTE — PROGRESS NOTE ADULT - SUBJECTIVE AND OBJECTIVE BOX
[facility] - Owen Campbell, Endy Wallace, Mick Tillman  Office Number:  [phone]    Patient resting comfortably in chair in NAD. No complaints of dyspnea, palpitations, PND, or orthopnea. Still having some chest discomfort especially with coughing.     ROS: negative unless otherwise mentioned.    Telemetry:  converted to afib at 21:40 last evening, with rates 100-130, currently at a rate of 106.     MEDICATIONS  (STANDING):  acetaminophen     Tablet .. 650 milliGRAM(s) Oral every 6 hours  aMIOdarone    Tablet 400 milliGRAM(s) Oral every 8 hours  aMIOdarone    Tablet   Oral   ascorbic acid 500 milliGRAM(s) Oral two times a day  aspirin  chewable 81 milliGRAM(s) Oral daily  atorvastatin 80 milliGRAM(s) Oral at bedtime  chlorhexidine 2% Cloths 1 Application(s) Topical daily  dexMEDEtomidine Infusion 0.5 MICROgram(s)/kG/Hr (10.6 mL/Hr) IV Continuous <Continuous>  dextrose 50% Injectable 25 milliLiter(s) IV Push every 15 minutes  dextrose 50% Injectable 50 milliLiter(s) IV Push every 15 minutes  gabapentin 100 milliGRAM(s) Oral every 8 hours  insulin lispro (ADMELOG) corrective regimen sliding scale   SubCutaneous Before meals and at bedtime  insulin regular Infusion 3 Unit(s)/Hr (3 mL/Hr) IV Continuous <Continuous>  melatonin 5 milliGRAM(s) Oral at bedtime  metoclopramide Injectable 10 milliGRAM(s) IV Push every 8 hours  metoprolol tartrate 25 milliGRAM(s) Oral every 8 hours  pantoprazole  Injectable 40 milliGRAM(s) IV Push daily  polyethylene glycol 3350 17 Gram(s) Oral daily  potassium chloride  10 mEq/50 mL IVPB 10 milliEquivalent(s) IV Intermittent every 1 hour  potassium chloride  10 mEq/50 mL IVPB 10 milliEquivalent(s) IV Intermittent every 1 hour  potassium chloride  10 mEq/50 mL IVPB 10 milliEquivalent(s) IV Intermittent every 1 hour  potassium phosphate / sodium phosphate Powder (PHOS-NaK) 1 Packet(s) Oral two times a day  senna 2 Tablet(s) Oral at bedtime  sodium chloride 0.9%. 1000 milliLiter(s) (10 mL/Hr) IV Continuous <Continuous>  spironolactone 50 milliGRAM(s) Oral <User Schedule>  torsemide 20 milliGRAM(s) Oral daily  vasopressin Infusion 0.04 Unit(s)/Min (6 mL/Hr) IV Continuous <Continuous>    MEDICATIONS  (PRN):  HYDROmorphone   Tablet 4 milliGRAM(s) Oral every 4 hours PRN Severe Pain (7 - 10)  HYDROmorphone   Tablet 2 milliGRAM(s) Oral every 3 hours PRN Moderate Pain (4 - 6)  HYDROmorphone  Injectable 0.5 milliGRAM(s) IV Push every 6 hours PRN Breakthrough Pain      Allergies    PC Pen VK (Rash)  adhesives (Rash)    Intolerances        Vital Signs Last 24 Hrs  T(C): 36.4 (09 Aug 2023 06:59), Max: 37.5 (08 Aug 2023 11:00)  T(F): 97.5 (09 Aug 2023 06:59), Max: 99.5 (08 Aug 2023 11:00)  HR: 114 (09 Aug 2023 06:59) (76 - 128)  BP: 96/69 (09 Aug 2023 10:06) (96/69 - 137/78)  BP(mean): 88 (09 Aug 2023 06:59) (82 - 101)  RR: 18 (09 Aug 2023 06:59) (14 - 28)  SpO2: 95% (09 Aug 2023 06:59) (90% - 99%)    Parameters below as of 09 Aug 2023 06:59  Patient On (Oxygen Delivery Method): nasal cannula        I&O's Summary    08 Aug 2023 07:01  -  09 Aug 2023 07:00  --------------------------------------------------------  IN: 1445 mL / OUT: 1160 mL / NET: 285 mL    09 Aug 2023 07:01  -  09 Aug 2023 10:27  --------------------------------------------------------  IN: 240 mL / OUT: 550 mL / NET: -310 mL        ON EXAM:    General: NAD, awake and alert, oriented x 3  HEENT: Mucous membranes are moist, anicteric  Lungs: Non-labored, breath sounds are clear bilaterally, No wheezing, rales or rhonchi  Cardiovascular: Regular, S1 and S2, no murmurs, rubs, or gallops  Gastrointestinal: Bowel Sounds present, soft, nontender.   Lymph: No peripheral edema. No lymphadenopathy.  Skin: No rashes or ulcers  Psych:  Mood & affect appropriate    LABS: All Labs Reviewed:                        10.3   11.28 )-----------( 159      ( 09 Aug 2023 07:40 )             30.8                         9.5    5.30  )-----------( 138      ( 08 Aug 2023 00:25 )             27.7                         8.2    3.23  )-----------( 97       ( 07 Aug 2023 16:54 )             23.1     09 Aug 2023 07:36    134    |  98     |  23     ----------------------------<  164    4.5     |  24     |  1.21   08 Aug 2023 00:26    142    |  108    |  11     ----------------------------<  109    4.0     |  25     |  0.92   07 Aug 2023 16:54    143    |  105    |  12     ----------------------------<  145    3.7     |  24     |  0.82     Ca    8.5        09 Aug 2023 07:36  Ca    8.3        08 Aug 2023 00:26  Ca    8.3        07 Aug 2023 16:54  Phos  2.5       09 Aug 2023 07:36  Phos  1.9       08 Aug 2023 00:26  Mg     2.4       09 Aug 2023 07:36  Mg     2.4       08 Aug 2023 00:26    TPro  5.9    /  Alb  3.8    /  TBili  0.5    /  DBili  x      /  AST  20     /  ALT  14     /  AlkPhos  36     09 Aug 2023 07:36  TPro  5.3    /  Alb  3.5    /  TBili  0.6    /  DBili  x      /  AST  32     /  ALT  14     /  AlkPhos  33     08 Aug 2023 00:26  TPro  4.6    /  Alb  3.3    /  TBili  0.7    /  DBili  x      /  AST  26     /  ALT  12     /  AlkPhos  30     07 Aug 2023 16:54    PT/INR - ( 08 Aug 2023 00:25 )   PT: 11.9 sec;   INR: 1.08 ratio         PTT - ( 08 Aug 2023 00:25 )  PTT:27.0 sec  CARDIAC MARKERS ( 07 Aug 2023 16:54 )  x     / x     / 351 U/L / x     / 19.4 ng/mL      Blood Culture:     08-07 @ 23:04  TSH: 0.26    EKG: NSR, LAD, NSST      TTE 12/22/2022  EF 55-60%   Increased relative wall thickening c/w LV concentric remodeling  Mildly Dilated RA, severely dilated LA  Mild calcification of mitral valve annulus      CCTA:  IMPRESSION:  1. Proximal LAD has severe stenosis with high risk features; mid LAD has probable severe stenosis. D2 has probable moderate-severe stenosis. Mid-distal Lcx has severe stenosis with high risk features. R-PDA has severe stenosis. R-PLB has indeterminate stenosis.  Rest of the coronary findings as detailed above.    2. Agatston calcium score of 2439, which is at 96th percentile for individuals of the same age, gender, and race/ethnicity who are free of clinical cardiovascular disease and treated diabetes by the MCKENZIE calculator.    3. Borderline dilated aortic root and dilated ascending aorta (measurements detailed above).    4. Asymmetric thickening of the basal anteroseptum  15mm, consider additional cardiac imaging (echocardiogram or cardiac MRI) for further evaluation as clinically indicated.    5. Probable patent foramen ovale, consider echocardiogram with bubble study if indicated.    6. Qualitatively dilated left atrium, consider correlation with echocardiogram as clinically indicated.    [facility] 7/18/23:  Left main artery: The segment is visually normal in size and  structure.    LAD   Proximal left anterior descending: There is an 85 % stenosis. Mid left  anterior descending: There is a 70 % stenosis. First  diagonal: Angiography shows mild atherosclerosis. Second diagonal:  Angiography shows minor irregularities.    CX   First obtuse marginal: There is an 85 % stenosis.      RCA   Mid right coronary artery: There is a 40 % stenosis. Distal right  coronary artery: There is a 40 % stenosis. Right posterior  descending artery: There is an 80 % stenosis. First right  posterolateral: There is a 100 % stenosis.           [facility] - Owen Campbell, Endy Wallace, Mick Tillman  Office Number:  [phone]    Patient resting comfortably in chair in NAD. No complaints of dyspnea, palpitations, PND, or orthopnea. Still having some chest discomfort especially with coughing.     ROS: negative unless otherwise mentioned.    Telemetry:  converted to afib at 21:40 last evening, with rates 100-130, currently at a rate of 106.     MEDICATIONS  (STANDING):  acetaminophen     Tablet .. 650 milliGRAM(s) Oral every 6 hours  aMIOdarone    Tablet 400 milliGRAM(s) Oral every 8 hours  aMIOdarone    Tablet   Oral   ascorbic acid 500 milliGRAM(s) Oral two times a day  aspirin  chewable 81 milliGRAM(s) Oral daily  atorvastatin 80 milliGRAM(s) Oral at bedtime  chlorhexidine 2% Cloths 1 Application(s) Topical daily  dexMEDEtomidine Infusion 0.5 MICROgram(s)/kG/Hr (10.6 mL/Hr) IV Continuous <Continuous>  dextrose 50% Injectable 25 milliLiter(s) IV Push every 15 minutes  dextrose 50% Injectable 50 milliLiter(s) IV Push every 15 minutes  gabapentin 100 milliGRAM(s) Oral every 8 hours  insulin lispro (ADMELOG) corrective regimen sliding scale   SubCutaneous Before meals and at bedtime  insulin regular Infusion 3 Unit(s)/Hr (3 mL/Hr) IV Continuous <Continuous>  melatonin 5 milliGRAM(s) Oral at bedtime  metoclopramide Injectable 10 milliGRAM(s) IV Push every 8 hours  metoprolol tartrate 25 milliGRAM(s) Oral every 8 hours  pantoprazole  Injectable 40 milliGRAM(s) IV Push daily  polyethylene glycol 3350 17 Gram(s) Oral daily  potassium chloride  10 mEq/50 mL IVPB 10 milliEquivalent(s) IV Intermittent every 1 hour  potassium chloride  10 mEq/50 mL IVPB 10 milliEquivalent(s) IV Intermittent every 1 hour  potassium chloride  10 mEq/50 mL IVPB 10 milliEquivalent(s) IV Intermittent every 1 hour  potassium phosphate / sodium phosphate Powder (PHOS-NaK) 1 Packet(s) Oral two times a day  senna 2 Tablet(s) Oral at bedtime  sodium chloride 0.9%. 1000 milliLiter(s) (10 mL/Hr) IV Continuous <Continuous>  spironolactone 50 milliGRAM(s) Oral <User Schedule>  torsemide 20 milliGRAM(s) Oral daily  vasopressin Infusion 0.04 Unit(s)/Min (6 mL/Hr) IV Continuous <Continuous>    MEDICATIONS  (PRN):  HYDROmorphone   Tablet 4 milliGRAM(s) Oral every 4 hours PRN Severe Pain (7 - 10)  HYDROmorphone   Tablet 2 milliGRAM(s) Oral every 3 hours PRN Moderate Pain (4 - 6)  HYDROmorphone  Injectable 0.5 milliGRAM(s) IV Push every 6 hours PRN Breakthrough Pain      Allergies    PC Pen VK (Rash)  adhesives (Rash)    Intolerances        Vital Signs Last 24 Hrs  T(C): 36.4 (09 Aug 2023 06:59), Max: 37.5 (08 Aug 2023 11:00)  T(F): 97.5 (09 Aug 2023 06:59), Max: 99.5 (08 Aug 2023 11:00)  HR: 114 (09 Aug 2023 06:59) (76 - 128)  BP: 96/69 (09 Aug 2023 10:06) (96/69 - 137/78)  BP(mean): 88 (09 Aug 2023 06:59) (82 - 101)  RR: 18 (09 Aug 2023 06:59) (14 - 28)  SpO2: 95% (09 Aug 2023 06:59) (90% - 99%)    Parameters below as of 09 Aug 2023 06:59  Patient On (Oxygen Delivery Method): nasal cannula        I&O's Summary    08 Aug 2023 07:01  -  09 Aug 2023 07:00  --------------------------------------------------------  IN: 1445 mL / OUT: 1160 mL / NET: 285 mL    09 Aug 2023 07:01  -  09 Aug 2023 10:27  --------------------------------------------------------  IN: 240 mL / OUT: 550 mL / NET: -310 mL        ON EXAM:    General: NAD, awake and alert, oriented x 3  HEENT: Mucous membranes are moist, anicteric  Lungs: Non-labored, crackles bilaterally   Cardiovascular: irregular, S1 and S2, no murmurs, rubs, or gallops  Gastrointestinal: Bowel Sounds present, soft, nontender.   Lymph: No peripheral edema. No lymphadenopathy.  Skin: No rashes or ulcers  Psych:  Mood & affect appropriate    LABS: All Labs Reviewed:                        10.3   11.28 )-----------( 159      ( 09 Aug 2023 07:40 )             30.8                         9.5    5.30  )-----------( 138      ( 08 Aug 2023 00:25 )             27.7                         8.2    3.23  )-----------( 97       ( 07 Aug 2023 16:54 )             23.1     09 Aug 2023 07:36    134    |  98     |  23     ----------------------------<  164    4.5     |  24     |  1.21   08 Aug 2023 00:26    142    |  108    |  11     ----------------------------<  109    4.0     |  25     |  0.92   07 Aug 2023 16:54    143    |  105    |  12     ----------------------------<  145    3.7     |  24     |  0.82     Ca    8.5        09 Aug 2023 07:36  Ca    8.3        08 Aug 2023 00:26  Ca    8.3        07 Aug 2023 16:54  Phos  2.5       09 Aug 2023 07:36  Phos  1.9       08 Aug 2023 00:26  Mg     2.4       09 Aug 2023 07:36  Mg     2.4       08 Aug 2023 00:26    TPro  5.9    /  Alb  3.8    /  TBili  0.5    /  DBili  x      /  AST  20     /  ALT  14     /  AlkPhos  36     09 Aug 2023 07:36  TPro  5.3    /  Alb  3.5    /  TBili  0.6    /  DBili  x      /  AST  32     /  ALT  14     /  AlkPhos  33     08 Aug 2023 00:26  TPro  4.6    /  Alb  3.3    /  TBili  0.7    /  DBili  x      /  AST  26     /  ALT  12     /  AlkPhos  30     07 Aug 2023 16:54    PT/INR - ( 08 Aug 2023 00:25 )   PT: 11.9 sec;   INR: 1.08 ratio         PTT - ( 08 Aug 2023 00:25 )  PTT:27.0 sec  CARDIAC MARKERS ( 07 Aug 2023 16:54 )  x     / x     / 351 U/L / x     / 19.4 ng/mL      Blood Culture:     08-07 @ 23:04  TSH: 0.26    EKG: NSR, LAD, NSST      TTE 12/22/2022  EF 55-60%   Increased relative wall thickening c/w LV concentric remodeling  Mildly Dilated RA, severely dilated LA  Mild calcification of mitral valve annulus      CCTA:  IMPRESSION:  1. Proximal LAD has severe stenosis with high risk features; mid LAD has probable severe stenosis. D2 has probable moderate-severe stenosis. Mid-distal Lcx has severe stenosis with high risk features. R-PDA has severe stenosis. R-PLB has indeterminate stenosis.  Rest of the coronary findings as detailed above.    2. Agatston calcium score of 2439, which is at 96th percentile for individuals of the same age, gender, and race/ethnicity who are free of clinical cardiovascular disease and treated diabetes by the MCKENZIE calculator.    3. Borderline dilated aortic root and dilated ascending aorta (measurements detailed above).    4. Asymmetric thickening of the basal anteroseptum  15mm, consider additional cardiac imaging (echocardiogram or cardiac MRI) for further evaluation as clinically indicated.    5. Probable patent foramen ovale, consider echocardiogram with bubble study if indicated.    6. Qualitatively dilated left atrium, consider correlation with echocardiogram as clinically indicated.    [facility] 7/18/23:  Left main artery: The segment is visually normal in size and  structure.    LAD   Proximal left anterior descending: There is an 85 % stenosis. Mid left  anterior descending: There is a 70 % stenosis. First  diagonal: Angiography shows mild atherosclerosis. Second diagonal:  Angiography shows minor irregularities.    CX   First obtuse marginal: There is an 85 % stenosis.      RCA   Mid right coronary artery: There is a 40 % stenosis. Distal right  coronary artery: There is a 40 % stenosis. Right posterior  descending artery: There is an 80 % stenosis. First right  posterolateral: There is a 100 % stenosis.

## 2023-08-09 NOTE — PROGRESS NOTE ADULT - SUBJECTIVE AND OBJECTIVE BOX
VITAL SIGNS    Telemetry:  afib 118    Vital Signs Last 24 Hrs  T(C): 36.5 (23 @ 03:14), Max: 37.5 (23 @ 11:00)  T(F): 97.7 (23 @ 03:14), Max: 99.5 (23 @ 11:00)  HR: 121 (23 @ 03:14) (76 - 128)  BP: 137/78 (23 @ 03:14) (106/71 - 137/78)  RR: 18 (23 @ 03:14) (14 - 28)  SpO2: 91% (23 @ 03:14) (90% - 100%)                    @ 07:01  -   @ 07:00  --------------------------------------------------------  IN: 1445 mL / OUT: 1160 mL / NET: 285 mL     @ 07:01  -   @ 08:29  --------------------------------------------------------  IN: 0 mL / OUT: 520 mL / NET: -520 mL          Daily     Daily Weight in k.4 (09 Aug 2023 06:30)            CAPILLARY BLOOD GLUCOSE      POCT Blood Glucose.: 154 mg/dL (09 Aug 2023 07:28)  POCT Blood Glucose.: 161 mg/dL (08 Aug 2023 20:44)  POCT Blood Glucose.: 178 mg/dL (08 Aug 2023 16:47)  POCT Blood Glucose.: 198 mg/dL (08 Aug 2023 11:16)            Drains:     MS         [  ] Drainage:                 L Pleural  [x  ]  Drainage:                R Pleural  [  ]  Drainage:    Pacing Wires        [x  ]   Settings: vvi                                 Isolated  [  ]    Coumadin    [ ] YES          [x  ]      NO                                   PHYSICAL EXAM        Neurology: alert and oriented x 3, nonfocal, no gross deficits  CV : s1 s2 RRR  Sternal Wound :  CDI , Stable  Lungs: cta  Abdomen: soft, nontender, nondistended, positive bowel sounds, last bowel movement , no                      chest tubes x 3  :    voiding        Extremities:    -  edema   /  -   calve tenderness ,    L leg  /  R leg  incisions cdi          acetaminophen     Tablet .. 650 milliGRAM(s) Oral every 6 hours  aMIOdarone    Tablet 400 milliGRAM(s) Oral every 8 hours  aMIOdarone    Tablet   Oral   ascorbic acid 500 milliGRAM(s) Oral two times a day  aspirin  chewable 81 milliGRAM(s) Oral daily  atorvastatin 80 milliGRAM(s) Oral at bedtime  chlorhexidine 2% Cloths 1 Application(s) Topical daily  dexMEDEtomidine Infusion 0.5 MICROgram(s)/kG/Hr IV Continuous <Continuous>  dextrose 50% Injectable 25 milliLiter(s) IV Push every 15 minutes  dextrose 50% Injectable 50 milliLiter(s) IV Push every 15 minutes  gabapentin 100 milliGRAM(s) Oral every 8 hours  HYDROmorphone   Tablet 2 milliGRAM(s) Oral every 3 hours PRN  HYDROmorphone   Tablet 4 milliGRAM(s) Oral every 4 hours PRN  HYDROmorphone  Injectable 0.5 milliGRAM(s) IV Push every 6 hours PRN  insulin lispro (ADMELOG) corrective regimen sliding scale   SubCutaneous Before meals and at bedtime  insulin regular Infusion 3 Unit(s)/Hr IV Continuous <Continuous>  melatonin 5 milliGRAM(s) Oral at bedtime  metoclopramide Injectable 10 milliGRAM(s) IV Push every 8 hours  metoprolol tartrate 25 milliGRAM(s) Oral every 8 hours  pantoprazole  Injectable 40 milliGRAM(s) IV Push daily  polyethylene glycol 3350 17 Gram(s) Oral daily  potassium chloride  10 mEq/50 mL IVPB 10 milliEquivalent(s) IV Intermittent every 1 hour  potassium chloride  10 mEq/50 mL IVPB 10 milliEquivalent(s) IV Intermittent every 1 hour  potassium chloride  10 mEq/50 mL IVPB 10 milliEquivalent(s) IV Intermittent every 1 hour  potassium phosphate / sodium phosphate Powder (PHOS-NaK) 1 Packet(s) Oral two times a day  senna 2 Tablet(s) Oral at bedtime  sodium chloride 0.9%. 1000 milliLiter(s) IV Continuous <Continuous>  spironolactone 50 milliGRAM(s) Oral <User Schedule>  torsemide 20 milliGRAM(s) Oral daily  vasopressin Infusion 0.04 Unit(s)/Min IV Continuous <Continuous>                    Physical Therapy Rec:   Home  [  ]   Home w/ PT  [  ]  Rehab  [  ]  Discussed with Cardiothoracic Team at AM rounds.

## 2023-08-09 NOTE — PROVIDER CONTACT NOTE (OTHER) - BACKGROUND
Patient is one day post-op - c4L on 8/7/23. PMH HTN, non-hodgkins lymphoma, SVT, BPH. EPM PWx4 40/10/0.8.

## 2023-08-09 NOTE — PROVIDER CONTACT NOTE (OTHER) - ACTION/TREATMENT ORDERED:
metroprolol tartrate 12.5mg PO ordered and administered as per SUDEEP Ramirez. Will continue to monitor.

## 2023-08-10 LAB
ALBUMIN SERPL ELPH-MCNC: 3.8 G/DL — SIGNIFICANT CHANGE UP (ref 3.3–5)
ALP SERPL-CCNC: 38 U/L — LOW (ref 40–120)
ALT FLD-CCNC: 14 U/L — SIGNIFICANT CHANGE UP (ref 10–45)
ANION GAP SERPL CALC-SCNC: 13 MMOL/L — SIGNIFICANT CHANGE UP (ref 5–17)
AST SERPL-CCNC: 15 U/L — SIGNIFICANT CHANGE UP (ref 10–40)
BASOPHILS # BLD AUTO: 0.02 K/UL — SIGNIFICANT CHANGE UP (ref 0–0.2)
BASOPHILS NFR BLD AUTO: 0.2 % — SIGNIFICANT CHANGE UP (ref 0–2)
BILIRUB SERPL-MCNC: 0.5 MG/DL — SIGNIFICANT CHANGE UP (ref 0.2–1.2)
BUN SERPL-MCNC: 25 MG/DL — HIGH (ref 7–23)
CALCIUM SERPL-MCNC: 8.8 MG/DL — SIGNIFICANT CHANGE UP (ref 8.4–10.5)
CHLORIDE SERPL-SCNC: 96 MMOL/L — SIGNIFICANT CHANGE UP (ref 96–108)
CO2 SERPL-SCNC: 26 MMOL/L — SIGNIFICANT CHANGE UP (ref 22–31)
CREAT SERPL-MCNC: 1.15 MG/DL — SIGNIFICANT CHANGE UP (ref 0.5–1.3)
EGFR: 69 ML/MIN/1.73M2 — SIGNIFICANT CHANGE UP
EOSINOPHIL # BLD AUTO: 0.06 K/UL — SIGNIFICANT CHANGE UP (ref 0–0.5)
EOSINOPHIL NFR BLD AUTO: 0.6 % — SIGNIFICANT CHANGE UP (ref 0–6)
GLUCOSE SERPL-MCNC: 137 MG/DL — HIGH (ref 70–99)
HCT VFR BLD CALC: 29.6 % — LOW (ref 39–50)
HGB BLD-MCNC: 9.8 G/DL — LOW (ref 13–17)
IMM GRANULOCYTES NFR BLD AUTO: 0.6 % — SIGNIFICANT CHANGE UP (ref 0–0.9)
LYMPHOCYTES # BLD AUTO: 0.98 K/UL — LOW (ref 1–3.3)
LYMPHOCYTES # BLD AUTO: 9.3 % — LOW (ref 13–44)
MAGNESIUM SERPL-MCNC: 2.1 MG/DL — SIGNIFICANT CHANGE UP (ref 1.6–2.6)
MCHC RBC-ENTMCNC: 31.2 PG — SIGNIFICANT CHANGE UP (ref 27–34)
MCHC RBC-ENTMCNC: 33.1 GM/DL — SIGNIFICANT CHANGE UP (ref 32–36)
MCV RBC AUTO: 94.3 FL — SIGNIFICANT CHANGE UP (ref 80–100)
MONOCYTES # BLD AUTO: 1.34 K/UL — HIGH (ref 0–0.9)
MONOCYTES NFR BLD AUTO: 12.7 % — SIGNIFICANT CHANGE UP (ref 2–14)
NEUTROPHILS # BLD AUTO: 8.1 K/UL — HIGH (ref 1.8–7.4)
NEUTROPHILS NFR BLD AUTO: 76.6 % — SIGNIFICANT CHANGE UP (ref 43–77)
NRBC # BLD: 0 /100 WBCS — SIGNIFICANT CHANGE UP (ref 0–0)
PHOSPHATE SERPL-MCNC: 2.6 MG/DL — SIGNIFICANT CHANGE UP (ref 2.5–4.5)
PLATELET # BLD AUTO: 152 K/UL — SIGNIFICANT CHANGE UP (ref 150–400)
POTASSIUM SERPL-MCNC: 4.2 MMOL/L — SIGNIFICANT CHANGE UP (ref 3.5–5.3)
POTASSIUM SERPL-SCNC: 4.2 MMOL/L — SIGNIFICANT CHANGE UP (ref 3.5–5.3)
PROT SERPL-MCNC: 6.4 G/DL — SIGNIFICANT CHANGE UP (ref 6–8.3)
RBC # BLD: 3.14 M/UL — LOW (ref 4.2–5.8)
RBC # FLD: 14.2 % — SIGNIFICANT CHANGE UP (ref 10.3–14.5)
SODIUM SERPL-SCNC: 135 MMOL/L — SIGNIFICANT CHANGE UP (ref 135–145)
WBC # BLD: 10.56 K/UL — HIGH (ref 3.8–10.5)
WBC # FLD AUTO: 10.56 K/UL — HIGH (ref 3.8–10.5)

## 2023-08-10 PROCEDURE — 93010 ELECTROCARDIOGRAM REPORT: CPT

## 2023-08-10 PROCEDURE — 93306 TTE W/DOPPLER COMPLETE: CPT | Mod: 26

## 2023-08-10 PROCEDURE — 71045 X-RAY EXAM CHEST 1 VIEW: CPT | Mod: 26

## 2023-08-10 PROCEDURE — 99291 CRITICAL CARE FIRST HOUR: CPT

## 2023-08-10 RX ORDER — METOPROLOL TARTRATE 50 MG
2.5 TABLET ORAL ONCE
Refills: 0 | Status: DISCONTINUED | OUTPATIENT
Start: 2023-08-10 | End: 2023-08-10

## 2023-08-10 RX ORDER — METOPROLOL TARTRATE 50 MG
2.5 TABLET ORAL ONCE
Refills: 0 | Status: COMPLETED | OUTPATIENT
Start: 2023-08-10 | End: 2023-08-10

## 2023-08-10 RX ORDER — DILTIAZEM HCL 120 MG
8 CAPSULE, EXT RELEASE 24 HR ORAL
Qty: 125 | Refills: 0 | Status: DISCONTINUED | OUTPATIENT
Start: 2023-08-10 | End: 2023-08-12

## 2023-08-10 RX ORDER — ALBUMIN HUMAN 25 %
250 VIAL (ML) INTRAVENOUS ONCE
Refills: 0 | Status: COMPLETED | OUTPATIENT
Start: 2023-08-10 | End: 2023-08-10

## 2023-08-10 RX ADMIN — Medication 500 MILLIGRAM(S): at 17:25

## 2023-08-10 RX ADMIN — Medication 50 MILLIGRAM(S): at 05:57

## 2023-08-10 RX ADMIN — Medication 10 MILLIGRAM(S): at 21:24

## 2023-08-10 RX ADMIN — SPIRONOLACTONE 50 MILLIGRAM(S): 25 TABLET, FILM COATED ORAL at 05:56

## 2023-08-10 RX ADMIN — Medication 500 MILLIGRAM(S): at 05:56

## 2023-08-10 RX ADMIN — APIXABAN 2.5 MILLIGRAM(S): 2.5 TABLET, FILM COATED ORAL at 05:57

## 2023-08-10 RX ADMIN — Medication 81 MILLIGRAM(S): at 11:19

## 2023-08-10 RX ADMIN — AMIODARONE HYDROCHLORIDE 400 MILLIGRAM(S): 400 TABLET ORAL at 05:57

## 2023-08-10 RX ADMIN — SENNA PLUS 2 TABLET(S): 8.6 TABLET ORAL at 21:23

## 2023-08-10 RX ADMIN — Medication 650 MILLIGRAM(S): at 05:57

## 2023-08-10 RX ADMIN — ATORVASTATIN CALCIUM 80 MILLIGRAM(S): 80 TABLET, FILM COATED ORAL at 21:24

## 2023-08-10 RX ADMIN — APIXABAN 2.5 MILLIGRAM(S): 2.5 TABLET, FILM COATED ORAL at 17:25

## 2023-08-10 RX ADMIN — Medication 2.5 MILLIGRAM(S): at 03:43

## 2023-08-10 RX ADMIN — Medication 1 PACKET(S): at 05:59

## 2023-08-10 RX ADMIN — Medication 650 MILLIGRAM(S): at 11:19

## 2023-08-10 RX ADMIN — GABAPENTIN 100 MILLIGRAM(S): 400 CAPSULE ORAL at 21:23

## 2023-08-10 RX ADMIN — GABAPENTIN 100 MILLIGRAM(S): 400 CAPSULE ORAL at 05:56

## 2023-08-10 RX ADMIN — SPIRONOLACTONE 50 MILLIGRAM(S): 25 TABLET, FILM COATED ORAL at 20:38

## 2023-08-10 RX ADMIN — CHLORHEXIDINE GLUCONATE 1 APPLICATION(S): 213 SOLUTION TOPICAL at 10:27

## 2023-08-10 RX ADMIN — Medication 10 MG/HR: at 08:39

## 2023-08-10 RX ADMIN — GABAPENTIN 100 MILLIGRAM(S): 400 CAPSULE ORAL at 13:52

## 2023-08-10 RX ADMIN — Medication 2.5 MILLIGRAM(S): at 05:11

## 2023-08-10 RX ADMIN — Medication 50 MILLIGRAM(S): at 21:23

## 2023-08-10 RX ADMIN — SPIRONOLACTONE 50 MILLIGRAM(S): 25 TABLET, FILM COATED ORAL at 11:19

## 2023-08-10 RX ADMIN — Medication 1 PACKET(S): at 17:25

## 2023-08-10 RX ADMIN — Medication 20 MILLIGRAM(S): at 05:56

## 2023-08-10 RX ADMIN — AMIODARONE HYDROCHLORIDE 400 MILLIGRAM(S): 400 TABLET ORAL at 21:23

## 2023-08-10 RX ADMIN — Medication 10 MILLIGRAM(S): at 13:53

## 2023-08-10 RX ADMIN — Medication 5 MG/HR: at 07:02

## 2023-08-10 RX ADMIN — Medication 50 MILLIGRAM(S): at 13:51

## 2023-08-10 RX ADMIN — Medication 1 PACKET(S): at 17:26

## 2023-08-10 RX ADMIN — Medication 10 MILLIGRAM(S): at 05:59

## 2023-08-10 RX ADMIN — AMIODARONE HYDROCHLORIDE 400 MILLIGRAM(S): 400 TABLET ORAL at 13:52

## 2023-08-10 RX ADMIN — Medication 125 MILLILITER(S): at 09:36

## 2023-08-10 NOTE — CONSULT NOTE ADULT - SUBJECTIVE AND OBJECTIVE BOX
[facility] - Owen Campbell, Endy Wallace, Mick Tillman  Office Number: [phone]    Initial Consult Note    CHIEF COMPLAINT: Patient is a 67y old  Male who presents with a chief complaint of "I am having a bypass" (24 Jul 2023 14:11)    HPI:  67 year old male PMH HTN, non-Hodgkin's lymphoma, SVT, BPH who presented after CT angio revealed 3vCAD, followed by cardiac cath which revealed significant severe 3vCAD who was sent to [facility] for planned CABG.   He is now POD 1, CABG x4 (LIMA-LAD, SVG-OM, SVG-Diag, SVG-PDA)  EBL 200ml    Feels well this morning. Some back and chest discomfort. Awake, alert in no distress.     Denies history of COVID infection. 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    FAMILY HISTORY:    No family history of acute MI or sudden cardiac death.    MEDICATIONS  (STANDING):  acetaminophen     Tablet .. 650 milliGRAM(s) Oral every 6 hours  ascorbic acid 500 milliGRAM(s) Oral two times a day  aspirin  chewable 81 milliGRAM(s) Oral daily  atorvastatin 80 milliGRAM(s) Oral at bedtime  cefuroxime  IVPB 1500 milliGRAM(s) IV Intermittent every 8 hours  chlorhexidine 2% Cloths 1 Application(s) Topical daily  dexMEDEtomidine Infusion 0.5 MICROgram(s)/kG/Hr (10.6 mL/Hr) IV Continuous <Continuous>  dextrose 50% Injectable 25 milliLiter(s) IV Push every 15 minutes  dextrose 50% Injectable 50 milliLiter(s) IV Push every 15 minutes  gabapentin 100 milliGRAM(s) Oral every 8 hours  insulin regular Infusion 3 Unit(s)/Hr (3 mL/Hr) IV Continuous <Continuous>  metoclopramide Injectable 10 milliGRAM(s) IV Push every 8 hours  metoprolol tartrate 25 milliGRAM(s) Oral every 8 hours  pantoprazole  Injectable 40 milliGRAM(s) IV Push daily  polyethylene glycol 3350 17 Gram(s) Oral daily  potassium chloride  10 mEq/50 mL IVPB 10 milliEquivalent(s) IV Intermittent every 1 hour  potassium chloride  10 mEq/50 mL IVPB 10 milliEquivalent(s) IV Intermittent every 1 hour  potassium chloride  10 mEq/50 mL IVPB 10 milliEquivalent(s) IV Intermittent every 1 hour  senna 2 Tablet(s) Oral at bedtime  sodium chloride 0.9%. 1000 milliLiter(s) (10 mL/Hr) IV Continuous <Continuous>  vasopressin Infusion 0.04 Unit(s)/Min (6 mL/Hr) IV Continuous <Continuous>    MEDICATIONS  (PRN):  HYDROmorphone  Injectable 0.5 milliGRAM(s) IV Push every 6 hours PRN Breakthrough Pain  oxyCODONE    IR 5 milliGRAM(s) Oral every 4 hours PRN Moderate Pain (4 - 6)  oxyCODONE    IR 10 milliGRAM(s) Oral every 4 hours PRN Severe Pain (7 - 10)      Allergies    PC Pen VK (Rash)  adhesives (Rash)    Intolerances        REVIEW OF SYSTEMS:    CONSTITUTIONAL: No weakness, fevers or chills  EYES/ENT: No visual changes;  No vertigo or throat pain   NECK: No pain or stiffness  RESPIRATORY: No cough, wheezing, hemoptysis; No shortness of breath  CARDIOVASCULAR: No chest pain or palpitations  GASTROINTESTINAL: No abdominal pain. No nausea, vomiting, or hematemesis; No diarrhea or constipation. No melena or hematochezia.  GENITOURINARY: No dysuria, frequency or hematuria  NEUROLOGICAL: No numbness or weakness  SKIN: No itching or rash  All other review of systems is negative unless indicated above    VITAL SIGNS:   Vital Signs Last 24 Hrs  T(C): 37.5 (08 Aug 2023 11:00), Max: 43 (07 Aug 2023 20:00)  T(F): 99.5 (08 Aug 2023 11:00), Max: 109.4 (07 Aug 2023 20:00)  HR: 80 (08 Aug 2023 11:00) (72 - 87)  BP: 104/55 (07 Aug 2023 16:45) (104/55 - 104/55)  BP(mean): 74 (07 Aug 2023 16:45) (74 - 74)  RR: 14 (08 Aug 2023 11:00) (0 - 31)  SpO2: 99% (08 Aug 2023 11:00) (90% - 100%)    Parameters below as of 08 Aug 2023 04:00  Patient On (Oxygen Delivery Method): nasal cannula        I&O's Summary    07 Aug 2023 07:01  -  08 Aug 2023 07:00  --------------------------------------------------------  IN: 2825.3 mL / OUT: 3034 mL / NET: -208.7 mL    08 Aug 2023 07:01  -  08 Aug 2023 11:22  --------------------------------------------------------  IN: 510 mL / OUT: 170 mL / NET: 340 mL    Telemetry: sinus 70's, no events     On Exam:    Constitutional: NAD, alert and oriented x 3  Lungs:  Non-labored, clear anteriorly   Cardiovascular: RRR.  S1 and S2 positive.  No murmurs, rubs, gallops or clicks. Sternotomy dressing c/d/i  Gastrointestinal: Bowel Sounds present, soft, nontender.   Lymph: No peripheral edema. No cervical lymphadenopathy.  Neurological: Alert, no focal deficits  Skin: No rashes or ulcers   Psych:  Mood & affect appropriate.    LABS: All Labs Reviewed:                        9.5    5.30  )-----------( 138      ( 08 Aug 2023 00:25 )             27.7                         8.2    3.23  )-----------( 97       ( 07 Aug 2023 16:54 )             23.1     08 Aug 2023 00:26    142    |  108    |  11     ----------------------------<  109    4.0     |  25     |  0.92   07 Aug 2023 16:54    143    |  105    |  12     ----------------------------<  145    3.7     |  24     |  0.82     Ca    8.3        08 Aug 2023 00:26  Ca    8.3        07 Aug 2023 16:54  Phos  1.9       08 Aug 2023 00:26  Mg     2.4       08 Aug 2023 00:26    TPro  5.3    /  Alb  3.5    /  TBili  0.6    /  DBili  x      /  AST  32     /  ALT  14     /  AlkPhos  33     08 Aug 2023 00:26  TPro  4.6    /  Alb  3.3    /  TBili  0.7    /  DBili  x      /  AST  26     /  ALT  12     /  AlkPhos  30     07 Aug 2023 16:54    PT/INR - ( 08 Aug 2023 00:25 )   PT: 11.9 sec;   INR: 1.08 ratio         PTT - ( 08 Aug 2023 00:25 )  PTT:27.0 sec  CARDIAC MARKERS ( 07 Aug 2023 16:54 )  x     / x     / 351 U/L / x     / 19.4 ng/mL      Blood Culture:     08-07 @ 23:04  TSH: 0.26      RADIOLOGY:        EKG: NSR, LAD, NSST      TTE 12/22/2022  EF 55-60%   Increased relative wall thickening c/w LV concentric remodeling  Mildly Dilated RA, severely dilated LA  Mild calcification of mitral valve annulus      CCTA:  IMPRESSION:  1. Proximal LAD has severe stenosis with high risk features; mid LAD has probable severe stenosis. D2 has probable moderate-severe stenosis. Mid-distal Lcx has severe stenosis with high risk features. R-PDA has severe stenosis. R-PLB has indeterminate stenosis.  Rest of the coronary findings as detailed above.    2. Agatston calcium score of 2439, which is at 96th percentile for individuals of the same age, gender, and race/ethnicity who are free of clinical cardiovascular disease and treated diabetes by the MCKENZIE calculator.    3. Borderline dilated aortic root and dilated ascending aorta (measurements detailed above).    4. Asymmetric thickening of the basal anteroseptum  15mm, consider additional cardiac imaging (echocardiogram or cardiac MRI) for further evaluation as clinically indicated.    5. Probable patent foramen ovale, consider echocardiogram with bubble study if indicated.    6. Qualitatively dilated left atrium, consider correlation with echocardiogram as clinically indicated.    [facility] 7/18/23:  Left main artery: The segment is visually normal in size and  structure.    LAD   Proximal left anterior descending: There is an 85 % stenosis. Mid left  anterior descending: There is a 70 % stenosis. First  diagonal: Angiography shows mild atherosclerosis. Second diagonal:  Angiography shows minor irregularities.    CX   First obtuse marginal: There is an 85 % stenosis.      RCA   Mid right coronary artery: There is a 40 % stenosis. Distal right  coronary artery: There is a 40 % stenosis. Right posterior  descending artery: There is an 80 % stenosis. First right  posterolateral: There is a 100 % stenosis.              
CHIEF COMPLAINT: Presented for planned CABG    HISTORY OF PRESENT ILLNESS: 67 year old male PMH HTN, non-Hodgkin's lymphoma, SVT, BPH who presented after CT angio revealed 3vCAD, followed by cardiac cath which revealed significant severe 3v CAD who was sent to [facility] for planned CABG. Patient is not POD #3 and converted to AF/AFL with RVR 8/9 PM. EP consulted for AF management.      Allergies    PC Pen VK (Rash)  adhesives (Rash)    Intolerances    	    MEDICATIONS:  aMIOdarone    Tablet   Oral   aMIOdarone    Tablet 400 milliGRAM(s) Oral every 8 hours  apixaban 2.5 milliGRAM(s) Oral two times a day  aspirin  chewable 81 milliGRAM(s) Oral daily  diltiazem Infusion 8 mG/Hr IV Continuous <Continuous>  metoprolol tartrate 50 milliGRAM(s) Oral every 8 hours  spironolactone 50 milliGRAM(s) Oral <User Schedule>  torsemide 20 milliGRAM(s) Oral daily  gabapentin 100 milliGRAM(s) Oral every 8 hours  HYDROmorphone   Tablet 4 milliGRAM(s) Oral every 4 hours PRN  HYDROmorphone   Tablet 2 milliGRAM(s) Oral every 3 hours PRN  melatonin 5 milliGRAM(s) Oral at bedtime  metoclopramide Injectable 10 milliGRAM(s) IV Push every 8 hours  bisacodyl Suppository 10 milliGRAM(s) Rectal once  pantoprazole  Injectable 40 milliGRAM(s) IV Push daily  polyethylene glycol 3350 17 Gram(s) Oral daily  psyllium Powder 1 Packet(s) Oral daily  senna 2 Tablet(s) Oral at bedtime  atorvastatin 80 milliGRAM(s) Oral at bedtime  dextrose 50% Injectable 25 milliLiter(s) IV Push every 15 minutes  dextrose 50% Injectable 50 milliLiter(s) IV Push every 15 minutes  insulin lispro (ADMELOG) corrective regimen sliding scale   SubCutaneous Before meals and at bedtime  ascorbic acid 500 milliGRAM(s) Oral two times a day  chlorhexidine 2% Cloths 1 Application(s) Topical daily  potassium chloride  10 mEq/50 mL IVPB 10 milliEquivalent(s) IV Intermittent every 1 hour  potassium chloride  10 mEq/50 mL IVPB 10 milliEquivalent(s) IV Intermittent every 1 hour  potassium chloride  10 mEq/50 mL IVPB 10 milliEquivalent(s) IV Intermittent every 1 hour  potassium phosphate / sodium phosphate Powder (PHOS-NaK) 1 Packet(s) Oral two times a day  sodium chloride 0.9%. 1000 milliLiter(s) IV Continuous <Continuous>      PAST MEDICAL & SURGICAL HISTORY:  Hypertension  Ulcerative colitis  Supraventricular Tachycardia  Non-Hodgkin lymphoma  Treated with Chemotherapy and Radiation Therapy  Hyperlipidemia  History of BPH  Finger Injury  foreign body removed from right hand middle  Malignant neoplasm of lymph node  Excision of Malignant Left Femoral Lymph Node, 2010  H/O hernia repair        REVIEW OF SYSTEMS:  See HPI. Otherwise, 10 point ROS done and otherwise negative.    PHYSICAL EXAM:  T(C): 36.6 (08-10-23 @ 10:58), Max: 37.6 (08-09-23 @ 23:33)  HR: 97 (08-10-23 @ 10:58) (97 - 128)  BP: 118/77 (08-10-23 @ 10:58) (86/62 - 130/90)  RR: 18 (08-10-23 @ 10:58) (18 - 19)  SpO2: 92% (08-10-23 @ 10:58) (92% - 96%)  Wt(kg): --  I&O's Summary    09 Aug 2023 07:01  -  10 Aug 2023 07:00  --------------------------------------------------------  IN: 1180 mL / OUT: 2070 mL / NET: -890 mL    10 Aug 2023 07:01  -  10 Aug 2023 12:32  --------------------------------------------------------  IN: 532 mL / OUT: 800 mL / NET: -268 mL        Appearance: Alert. NAD	  Cardiovascular: +S1S2   Respiratory: Chest clear to auscultation	  Psychiatry: A & O x 3, Mood & affect appropriate	  Skin: No rashes	  Neurologic: Non-focal  Extremities: No edema BLE  Vascular: Peripheral pulses palpable 2+ bilaterally      LABS:	 	    CBC Full  -  ( 10 Aug 2023 07:13 )  WBC Count : 10.56 K/uL  Hemoglobin : 9.8 g/dL  Hematocrit : 29.6 %  Platelet Count - Automated : 152 K/uL  Mean Cell Volume : 94.3 fl  Mean Cell Hemoglobin : 31.2 pg  Mean Cell Hemoglobin Concentration : 33.1 gm/dL  Auto Neutrophil # : 8.10 K/uL  Auto Lymphocyte # : 0.98 K/uL  Auto Monocyte # : 1.34 K/uL  Auto Eosinophil # : 0.06 K/uL  Auto Basophil # : 0.02 K/uL  Auto Neutrophil % : 76.6 %  Auto Lymphocyte % : 9.3 %  Auto Monocyte % : 12.7 %  Auto Eosinophil % : 0.6 %  Auto Basophil % : 0.2 %    08-10    135  |  96  |  25<H>  ----------------------------<  137<H>  4.2   |  26  |  1.15  08-09    134<L>  |  98  |  23  ----------------------------<  164<H>  4.5   |  24  |  1.21    Ca    8.8      10 Aug 2023 07:16  Ca    8.5      09 Aug 2023 07:36  Phos  2.6     08-10  Phos  2.5     08-09  Mg     2.1     08-10  Mg     2.4     08-09    TPro  6.4  /  Alb  3.8  /  TBili  0.5  /  DBili  x   /  AST  15  /  ALT  14  /  AlkPhos  38<L>  08-10  TPro  5.9<L>  /  Alb  3.8  /  TBili  0.5  /  DBili  x   /  AST  20  /  ALT  14  /  AlkPhos  36<L>  08-09    TELEMETRY: AF/AFL 90-130s  	    Echocardiogram: PENDING

## 2023-08-10 NOTE — PROGRESS NOTE ADULT - SUBJECTIVE AND OBJECTIVE BOX
[facility] -- Adrian Garcia, Owen Ayers, Endy Wallace Savella  Office # [phone]      Follow Up:  CAD AF, shelter    Subjective/Observations: Patient seen and examined. Events noted. Resting comfortably in bed. Now still in AF with RVR. C/o pleuritic cp. Hot flashes when HR high and associated dyspnea.       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 650 milliGRAM(s) Oral every 6 hours  aMIOdarone    Tablet   Oral   aMIOdarone    Tablet 400 milliGRAM(s) Oral every 8 hours  apixaban 2.5 milliGRAM(s) Oral two times a day  ascorbic acid 500 milliGRAM(s) Oral two times a day  aspirin  chewable 81 milliGRAM(s) Oral daily  atorvastatin 80 milliGRAM(s) Oral at bedtime  bisacodyl Suppository 10 milliGRAM(s) Rectal once  chlorhexidine 2% Cloths 1 Application(s) Topical daily  dextrose 50% Injectable 25 milliLiter(s) IV Push every 15 minutes  dextrose 50% Injectable 50 milliLiter(s) IV Push every 15 minutes  diltiazem Infusion 10 mG/Hr (10 mL/Hr) IV Continuous <Continuous>  gabapentin 100 milliGRAM(s) Oral every 8 hours  insulin lispro (ADMELOG) corrective regimen sliding scale   SubCutaneous Before meals and at bedtime  melatonin 5 milliGRAM(s) Oral at bedtime  metoclopramide Injectable 10 milliGRAM(s) IV Push every 8 hours  metoprolol tartrate 50 milliGRAM(s) Oral every 8 hours  pantoprazole  Injectable 40 milliGRAM(s) IV Push daily  polyethylene glycol 3350 17 Gram(s) Oral daily  potassium chloride  10 mEq/50 mL IVPB 10 milliEquivalent(s) IV Intermittent every 1 hour  potassium chloride  10 mEq/50 mL IVPB 10 milliEquivalent(s) IV Intermittent every 1 hour  potassium chloride  10 mEq/50 mL IVPB 10 milliEquivalent(s) IV Intermittent every 1 hour  potassium phosphate / sodium phosphate Powder (PHOS-NaK) 1 Packet(s) Oral two times a day  psyllium Powder 1 Packet(s) Oral daily  senna 2 Tablet(s) Oral at bedtime  sodium chloride 0.9%. 1000 milliLiter(s) (10 mL/Hr) IV Continuous <Continuous>  spironolactone 50 milliGRAM(s) Oral <User Schedule>  torsemide 20 milliGRAM(s) Oral daily    MEDICATIONS  (PRN):  HYDROmorphone   Tablet 2 milliGRAM(s) Oral every 3 hours PRN Moderate Pain (4 - 6)  HYDROmorphone   Tablet 4 milliGRAM(s) Oral every 4 hours PRN Severe Pain (7 - 10)      Allergies    PC Pen VK (Rash)  adhesives (Rash)    Intolerances            Vital Signs Last 24 Hrs  T(C): 36.4 (10 Aug 2023 06:55), Max: 37.6 (09 Aug 2023 23:33)  T(F): 97.5 (10 Aug 2023 06:55), Max: 99.6 (09 Aug 2023 23:33)  HR: 108 (10 Aug 2023 08:47) (103 - 128)  BP: 100/65 (10 Aug 2023 08:47) (95/80 - 130/90)  BP(mean): 75 (10 Aug 2023 08:47) (71 - 106)  RR: 19 (10 Aug 2023 07:15) (18 - 19)  SpO2: 93% (10 Aug 2023 07:15) (91% - 96%)    Parameters below as of 10 Aug 2023 07:15  Patient On (Oxygen Delivery Method): nasal cannula  O2 Flow (L/min): 3      I&O's Summary    09 Aug 2023 07:01  -  10 Aug 2023 07:00  --------------------------------------------------------  IN: 1180 mL / OUT: 2070 mL / NET: -890 mL    10 Aug 2023 07:01  -  10 Aug 2023 09:07  --------------------------------------------------------  IN: 18 mL / OUT: 800 mL / NET: -782 mL          PHYSICAL EXAM:  TELE: AF/-130  Constitutional: NAD, awake  HEENT: Moist Mucous Membranes, Anicteric  Pulmonary: Decreased breath sounds b/l. No rales, crackles or wheeze appreciated.   Cardiovascular: IRRR tachy, S1 and S2, No murmurs, rubs, gallops or clicks  Gastrointestinal: Bowel Sounds present, soft, nontender.   Lymph: trace peripheral edema. No lymphadenopathy.  Skin: No visible rashes or ulcers.  Psych:  Mood & affect appropriate for situation    LABS: All Labs Reviewed:                        9.8    10.56 )-----------( 152      ( 10 Aug 2023 07:13 )             29.6                         10.3   11.28 )-----------( 159      ( 09 Aug 2023 07:40 )             30.8                         9.5    5.30  )-----------( 138      ( 08 Aug 2023 00:25 )             27.7     10 Aug 2023 07:16    135    |  96     |  25     ----------------------------<  137    4.2     |  26     |  1.15   09 Aug 2023 07:36    134    |  98     |  23     ----------------------------<  164    4.5     |  24     |  1.21   08 Aug 2023 00:26    142    |  108    |  11     ----------------------------<  109    4.0     |  25     |  0.92     Ca    8.8        10 Aug 2023 07:16  Ca    8.5        09 Aug 2023 07:36  Ca    8.3        08 Aug 2023 00:26  Phos  2.6       10 Aug 2023 07:16  Phos  2.5       09 Aug 2023 07:36  Phos  1.9       08 Aug 2023 00:26  Mg     2.1       10 Aug 2023 07:16  Mg     2.4       09 Aug 2023 07:36  Mg     2.4       08 Aug 2023 00:26    TPro  6.4    /  Alb  3.8    /  TBili  0.5    /  DBili  x      /  AST  15     /  ALT  14     /  AlkPhos  38     10 Aug 2023 07:16  TPro  5.9    /  Alb  3.8    /  TBili  0.5    /  DBili  x      /  AST  20     /  ALT  14     /  AlkPhos  36     09 Aug 2023 07:36  TPro  5.3    /  Alb  3.5    /  TBili  0.6    /  DBili  x      /  AST  32     /  ALT  14     /  AlkPhos  33     08 Aug 2023 00:26        - Troponin:

## 2023-08-10 NOTE — PROGRESS NOTE ADULT - CRITICAL CARE ATTENDING COMMENT
Upon my evaluation, this patient is at high risk for imminent or life threatening deterioration due to AF with RVR  and other active medical issues which require my direct attention, intervention, and personal management.  I have personally spent >30 minutes  of critical care time exclusive of time spent on separate billing procedures. This includes review of laboratory data, radiology results, discussion with primary team\patient, and monitoring for potential decompensation. Interventions were performed as documented above.

## 2023-08-10 NOTE — PROGRESS NOTE ADULT - ASSESSMENT
67 year old male PMH HTN, non-Hodgkin's lymphoma, SVT, BPH who presented after CT angio revealed 3vCAD, followed by cardiac cath which revealed significant severe 3vCAD who was sent to [facility] for planned CABG.   He is now POD 1, CABG x4 (LIMA-LAD, SVG-OM, SVG-Diag, SVG-PDA) now with post op afib.     #3vCAD s/p CABG:  - Continue ASA, high intensity statin  - Continue Lopressor 50q8  - Pressors currently off at the bedside   - cont on Torsemide, Aldactone  - may need additional diuretic today.   - Please continue to maintain strict I/Os, monitor daily weights, Cr, and K.   - Incentive spirometry encouraged   - H+H stable this AM       #Post op afib, CHADSVASc 3 (age, CAD, HTN). Patient has signed consent for PACeS trial, to be randomized today or tomorrow  - Af is not well controlled despite AMio and escalating AVN agents  - cont lopressor 50mg q8. can increase to q6 if bp tolerates  - Now on cardizem gtt  - cont on amio load  - EP consult called for potential DCCV  - Now on AC. cont eliquis per ctsx  - Monitor and replete electrolytes. Keep K>4.0 and Mg>2.0.     - Further cardiac workup will depend on clinical course.   - All other workup per primary team. Will followup.

## 2023-08-10 NOTE — PROGRESS NOTE ADULT - SUBJECTIVE AND OBJECTIVE BOX
VITAL SIGNS    Telemetry:      Vital Signs Last 24 Hrs  T(C): 36.4 (08-10-23 @ 06:55), Max: 37.6 (23 @ 23:33)  T(F): 97.5 (08-10-23 @ 06:55), Max: 99.6 (23 @ 23:33)  HR: 103 (08-10-23 @ 08:00) (103 - 128)  BP: 116/69 (08-10-23 @ 08:00) (95/80 - 130/90)  RR: 19 (08-10-23 @ 07:15) (18 - 19)  SpO2: 93% (08-10-23 @ 07:15) (91% - 96%)                    @ 07:01  -  08-10 @ 07:00  --------------------------------------------------------  IN: 1180 mL / OUT: 2070 mL / NET: -890 mL    08-10 @ 07:01  -  08-10 @ 08:09  --------------------------------------------------------  IN: 0 mL / OUT: 550 mL / NET: -550 mL          Daily     Daily Weight in k.9 (10 Aug 2023 06:00)            CAPILLARY BLOOD GLUCOSE      POCT Blood Glucose.: 132 mg/dL (10 Aug 2023 07:18)  POCT Blood Glucose.: 158 mg/dL (09 Aug 2023 20:46)  POCT Blood Glucose.: 117 mg/dL (09 Aug 2023 16:36)  POCT Blood Glucose.: 156 mg/dL (09 Aug 2023 11:05)            Drains:     MS         [  ] Drainage:                 L Pleural  [  ]  Drainage:                R Pleural  [  ]  Drainage:    Pacing Wires        [  ]   Settings:                                  Isolated  [  ]    Coumadin    [ ] YES          [  ]      NO                                   PHYSICAL EXAM        Neurology: alert and oriented x 3, nonfocal, no gross deficits  CV : s1 s2 RRR  Sternal Wound :  CDI , Stable  Lungs: cta  Abdomen: soft, nontender, nondistended, positive bowel sounds, last bowel movement                       chest tubes  :    voiding / brown - sbd         Extremities:      edema   /  -   calve tenderness ,    L leg  /  R leg  incisions cdi          acetaminophen     Tablet .. 650 milliGRAM(s) Oral every 6 hours  aMIOdarone    Tablet   Oral   aMIOdarone    Tablet 400 milliGRAM(s) Oral every 8 hours  apixaban 2.5 milliGRAM(s) Oral two times a day  ascorbic acid 500 milliGRAM(s) Oral two times a day  aspirin  chewable 81 milliGRAM(s) Oral daily  atorvastatin 80 milliGRAM(s) Oral at bedtime  bisacodyl Suppository 10 milliGRAM(s) Rectal once  chlorhexidine 2% Cloths 1 Application(s) Topical daily  dexMEDEtomidine Infusion 0.5 MICROgram(s)/kG/Hr IV Continuous <Continuous>  dextrose 50% Injectable 25 milliLiter(s) IV Push every 15 minutes  dextrose 50% Injectable 50 milliLiter(s) IV Push every 15 minutes  diltiazem Infusion 8 mG/Hr IV Continuous <Continuous>  gabapentin 100 milliGRAM(s) Oral every 8 hours  HYDROmorphone   Tablet 4 milliGRAM(s) Oral every 4 hours PRN  HYDROmorphone   Tablet 2 milliGRAM(s) Oral every 3 hours PRN  insulin lispro (ADMELOG) corrective regimen sliding scale   SubCutaneous Before meals and at bedtime  melatonin 5 milliGRAM(s) Oral at bedtime  metoclopramide Injectable 10 milliGRAM(s) IV Push every 8 hours  metoprolol tartrate 50 milliGRAM(s) Oral every 8 hours  pantoprazole  Injectable 40 milliGRAM(s) IV Push daily  polyethylene glycol 3350 17 Gram(s) Oral daily  potassium chloride  10 mEq/50 mL IVPB 10 milliEquivalent(s) IV Intermittent every 1 hour  potassium chloride  10 mEq/50 mL IVPB 10 milliEquivalent(s) IV Intermittent every 1 hour  potassium chloride  10 mEq/50 mL IVPB 10 milliEquivalent(s) IV Intermittent every 1 hour  potassium phosphate / sodium phosphate Powder (PHOS-NaK) 1 Packet(s) Oral two times a day  psyllium Powder 1 Packet(s) Oral daily  senna 2 Tablet(s) Oral at bedtime  sodium chloride 0.9%. 1000 milliLiter(s) IV Continuous <Continuous>  spironolactone 50 milliGRAM(s) Oral <User Schedule>  torsemide 20 milliGRAM(s) Oral daily                    Physical Therapy Rec:   Home  [  ]   Home w/ PT  [  ]  Rehab  [  ]  Discussed with Cardiothoracic Team at AM rounds.

## 2023-08-10 NOTE — CONSULT NOTE ADULT - ASSESSMENT
67 year old male PMH HTN, non-Hodgkin's lymphoma, SVT, BPH who presented after CT angio revealed 3vCAD, followed by cardiac cath which revealed significant severe 3vCAD who was sent to [facility] for planned CABG.   He is now POD 1, CABG x4 (LIMA-LAD, SVG-OM, SVG-Diag, SVG-PDA)    #3vCAD s/p CABG:  - Continue ASA, high intensity statin  - Continue Lopressor 25mg Q8H  - Pressors currently off at the bedside   - Chest tube mgmt as per CTS  - temporary pacemaker wires still in place  - Significant hgb drop from baseline post-op, monitor H+H closely   - Current on insulin drip, last   - Keep K>4, Mg>2
67 year old male PMH HTN, non-Hodgkin's lymphoma, SVT, BPH who presented after CT angio revealed 3vCAD, followed by cardiac cath which revealed significant severe 3v CAD who was sent to [facility] for planned CABG. Patient is not POD #3 and converted to AF/AFL with RVR 8/9 PM. EP consulted for AF management.    1. New onset atrial fibrillation/atrial flutter  2. S/p CABG POD #3     - Continue to monitor on telemetry  - Keep K>4 and Mg>2  - Recommend rate control with Cardizem gtt and Metoprolol 50mg TID  - Patient is currently on sub-therapeutic AC with Eliquis 2.5mg BID, would not attempt rhythm control until on full dose anticoagulation  - Order TTE    BEHZAD ArredondoC  #[phone]

## 2023-08-10 NOTE — PROGRESS NOTE ADULT - SUBJECTIVE AND OBJECTIVE BOX
VITAL SIGNS    Telemetry:  afib 100    Vital Signs Last 24 Hrs  T(C): 36.4 (08-10-23 @ 06:55), Max: 37.6 (23 @ 23:33)  T(F): 97.5 (08-10-23 @ 06:55), Max: 99.6 (23 @ 23:33)  HR: 104 (08-10-23 @ 09:46) (103 - 128)  BP: 102/67 (08-10-23 @ 09:46) (86/62 - 130/90)  RR: 19 (08-10-23 @ 07:15) (18 - 19)  SpO2: 93% (08-10-23 @ 07:15) (91% - 96%)                    @ 07:01  -  08-10 @ 07:00  --------------------------------------------------------  IN: 1180 mL / OUT: 2070 mL / NET: -890 mL    08-10 @ 07:01  -  08-10 @ 10:12  --------------------------------------------------------  IN: 516 mL / OUT: 800 mL / NET: -284 mL          Daily     Daily Weight in k.9 (10 Aug 2023 06:00)            CAPILLARY BLOOD GLUCOSE      POCT Blood Glucose.: 132 mg/dL (10 Aug 2023 07:18)  POCT Blood Glucose.: 158 mg/dL (09 Aug 2023 20:46)  POCT Blood Glucose.: 117 mg/dL (09 Aug 2023 16:36)  POCT Blood Glucose.: 156 mg/dL (09 Aug 2023 11:05)            Drains:      Pacing Wires        [ x  ]   Settings:                                  Isolated  [  ]  vvi 40  Coumadin    [ ] YES          [ x ]      NO       eliquis                            PHYSICAL EXAM        Neurology: alert and oriented x 3, nonfocal, no gross deficits  CV : irreg reg  Sternal Wound :  CDI , Stable  Lungs: cta  Abdomen: soft, nontender, nondistended, positive bowel sounds, last bowel movement + last luis                        :    voiding     Extremities:     trace  edema   /  -   calve tenderness ,    R leg  incisions cdi          acetaminophen     Tablet .. 650 milliGRAM(s) Oral every 6 hours  aMIOdarone    Tablet   Oral   aMIOdarone    Tablet 400 milliGRAM(s) Oral every 8 hours  apixaban 2.5 milliGRAM(s) Oral two times a day  ascorbic acid 500 milliGRAM(s) Oral two times a day  aspirin  chewable 81 milliGRAM(s) Oral daily  atorvastatin 80 milliGRAM(s) Oral at bedtime  bisacodyl Suppository 10 milliGRAM(s) Rectal once  chlorhexidine 2% Cloths 1 Application(s) Topical daily  dextrose 50% Injectable 25 milliLiter(s) IV Push every 15 minutes  dextrose 50% Injectable 50 milliLiter(s) IV Push every 15 minutes  diltiazem Infusion 8 mG/Hr IV Continuous <Continuous>  gabapentin 100 milliGRAM(s) Oral every 8 hours  HYDROmorphone   Tablet 4 milliGRAM(s) Oral every 4 hours PRN  HYDROmorphone   Tablet 2 milliGRAM(s) Oral every 3 hours PRN  insulin lispro (ADMELOG) corrective regimen sliding scale   SubCutaneous Before meals and at bedtime  melatonin 5 milliGRAM(s) Oral at bedtime  metoclopramide Injectable 10 milliGRAM(s) IV Push every 8 hours  metoprolol tartrate 50 milliGRAM(s) Oral every 8 hours  pantoprazole  Injectable 40 milliGRAM(s) IV Push daily  polyethylene glycol 3350 17 Gram(s) Oral daily  potassium chloride  10 mEq/50 mL IVPB 10 milliEquivalent(s) IV Intermittent every 1 hour  potassium chloride  10 mEq/50 mL IVPB 10 milliEquivalent(s) IV Intermittent every 1 hour  potassium chloride  10 mEq/50 mL IVPB 10 milliEquivalent(s) IV Intermittent every 1 hour  potassium phosphate / sodium phosphate Powder (PHOS-NaK) 1 Packet(s) Oral two times a day  psyllium Powder 1 Packet(s) Oral daily  senna 2 Tablet(s) Oral at bedtime  sodium chloride 0.9%. 1000 milliLiter(s) IV Continuous <Continuous>  spironolactone 50 milliGRAM(s) Oral <User Schedule>  torsemide 20 milliGRAM(s) Oral daily                    Physical Therapy Rec:   Home  [  ]   Home w/ PT  [  ]  Rehab  [  ]  Discussed with Cardiothoracic Team at AM rounds.

## 2023-08-10 NOTE — PROGRESS NOTE ADULT - ASSESSMENT
Pt. with a history of HTN s/p CT angio which revealed CAD, followed by cardiac cath which revealed significant CAD and CABG recommended. Pt. very active, walks 4 miles daily, denies chest pain, denies palpitations denies dizziness and denies dyspnea on exertion.  8/7 CABG x 4 (LIMA-LAD, SVG-OM, SVG-Diag,  Vasopresin, fluid resuscitation  8/8 Transferred to [facility]  8/9 Rapid afib since last luis.  Amio load, increase lopressor.  Diuresis ,   Maintain ct   F/u lactate.  8/10  Pt in aflutter, hr 128,  multiple ivp lopressor given, hr unchanged. Started on cardizem infusion, hr 100-110.  maintain amio/lopressor  Eliquis started this am

## 2023-08-11 DIAGNOSIS — I48.91 UNSPECIFIED ATRIAL FIBRILLATION: ICD-10-CM

## 2023-08-11 LAB
ALBUMIN SERPL ELPH-MCNC: 3.5 G/DL — SIGNIFICANT CHANGE UP (ref 3.3–5)
ALP SERPL-CCNC: 33 U/L — LOW (ref 40–120)
ALT FLD-CCNC: 14 U/L — SIGNIFICANT CHANGE UP (ref 10–45)
ANION GAP SERPL CALC-SCNC: 13 MMOL/L — SIGNIFICANT CHANGE UP (ref 5–17)
AST SERPL-CCNC: 13 U/L — SIGNIFICANT CHANGE UP (ref 10–40)
BASOPHILS # BLD AUTO: 0.02 K/UL — SIGNIFICANT CHANGE UP (ref 0–0.2)
BASOPHILS NFR BLD AUTO: 0.3 % — SIGNIFICANT CHANGE UP (ref 0–2)
BILIRUB SERPL-MCNC: 0.6 MG/DL — SIGNIFICANT CHANGE UP (ref 0.2–1.2)
BUN SERPL-MCNC: 20 MG/DL — SIGNIFICANT CHANGE UP (ref 7–23)
CALCIUM SERPL-MCNC: 8.5 MG/DL — SIGNIFICANT CHANGE UP (ref 8.4–10.5)
CHLORIDE SERPL-SCNC: 99 MMOL/L — SIGNIFICANT CHANGE UP (ref 96–108)
CO2 SERPL-SCNC: 24 MMOL/L — SIGNIFICANT CHANGE UP (ref 22–31)
CREAT SERPL-MCNC: 1.1 MG/DL — SIGNIFICANT CHANGE UP (ref 0.5–1.3)
EGFR: 73 ML/MIN/1.73M2 — SIGNIFICANT CHANGE UP
EOSINOPHIL # BLD AUTO: 0.08 K/UL — SIGNIFICANT CHANGE UP (ref 0–0.5)
EOSINOPHIL NFR BLD AUTO: 1.1 % — SIGNIFICANT CHANGE UP (ref 0–6)
GLUCOSE SERPL-MCNC: 122 MG/DL — HIGH (ref 70–99)
HCT VFR BLD CALC: 28.3 % — LOW (ref 39–50)
HGB BLD-MCNC: 9.7 G/DL — LOW (ref 13–17)
IMM GRANULOCYTES NFR BLD AUTO: 0.4 % — SIGNIFICANT CHANGE UP (ref 0–0.9)
LYMPHOCYTES # BLD AUTO: 0.87 K/UL — LOW (ref 1–3.3)
LYMPHOCYTES # BLD AUTO: 11.8 % — LOW (ref 13–44)
MAGNESIUM SERPL-MCNC: 2 MG/DL — SIGNIFICANT CHANGE UP (ref 1.6–2.6)
MCHC RBC-ENTMCNC: 32 PG — SIGNIFICANT CHANGE UP (ref 27–34)
MCHC RBC-ENTMCNC: 34.3 GM/DL — SIGNIFICANT CHANGE UP (ref 32–36)
MCV RBC AUTO: 93.4 FL — SIGNIFICANT CHANGE UP (ref 80–100)
MONOCYTES # BLD AUTO: 1.24 K/UL — HIGH (ref 0–0.9)
MONOCYTES NFR BLD AUTO: 16.8 % — HIGH (ref 2–14)
NEUTROPHILS # BLD AUTO: 5.15 K/UL — SIGNIFICANT CHANGE UP (ref 1.8–7.4)
NEUTROPHILS NFR BLD AUTO: 69.6 % — SIGNIFICANT CHANGE UP (ref 43–77)
NRBC # BLD: 0 /100 WBCS — SIGNIFICANT CHANGE UP (ref 0–0)
PHOSPHATE SERPL-MCNC: 2.4 MG/DL — LOW (ref 2.5–4.5)
PLATELET # BLD AUTO: 148 K/UL — LOW (ref 150–400)
POTASSIUM SERPL-MCNC: 3.7 MMOL/L — SIGNIFICANT CHANGE UP (ref 3.5–5.3)
POTASSIUM SERPL-SCNC: 3.7 MMOL/L — SIGNIFICANT CHANGE UP (ref 3.5–5.3)
PROT SERPL-MCNC: 5.9 G/DL — LOW (ref 6–8.3)
RBC # BLD: 3.03 M/UL — LOW (ref 4.2–5.8)
RBC # FLD: 14 % — SIGNIFICANT CHANGE UP (ref 10.3–14.5)
SODIUM SERPL-SCNC: 136 MMOL/L — SIGNIFICANT CHANGE UP (ref 135–145)
WBC # BLD: 7.39 K/UL — SIGNIFICANT CHANGE UP (ref 3.8–10.5)
WBC # FLD AUTO: 7.39 K/UL — SIGNIFICANT CHANGE UP (ref 3.8–10.5)

## 2023-08-11 PROCEDURE — 99233 SBSQ HOSP IP/OBS HIGH 50: CPT

## 2023-08-11 RX ORDER — DILTIAZEM HCL 120 MG
60 CAPSULE, EXT RELEASE 24 HR ORAL EVERY 6 HOURS
Refills: 0 | Status: DISCONTINUED | OUTPATIENT
Start: 2023-08-11 | End: 2023-08-15

## 2023-08-11 RX ORDER — POTASSIUM CHLORIDE 20 MEQ
20 PACKET (EA) ORAL ONCE
Refills: 0 | Status: COMPLETED | OUTPATIENT
Start: 2023-08-11 | End: 2023-08-11

## 2023-08-11 RX ADMIN — PANTOPRAZOLE SODIUM 40 MILLIGRAM(S): 20 TABLET, DELAYED RELEASE ORAL at 12:53

## 2023-08-11 RX ADMIN — GABAPENTIN 100 MILLIGRAM(S): 400 CAPSULE ORAL at 21:13

## 2023-08-11 RX ADMIN — GABAPENTIN 100 MILLIGRAM(S): 400 CAPSULE ORAL at 05:44

## 2023-08-11 RX ADMIN — APIXABAN 2.5 MILLIGRAM(S): 2.5 TABLET, FILM COATED ORAL at 17:25

## 2023-08-11 RX ADMIN — Medication 50 MILLIGRAM(S): at 13:11

## 2023-08-11 RX ADMIN — Medication 60 MILLIGRAM(S): at 23:36

## 2023-08-11 RX ADMIN — Medication 50 MILLIGRAM(S): at 05:44

## 2023-08-11 RX ADMIN — APIXABAN 2.5 MILLIGRAM(S): 2.5 TABLET, FILM COATED ORAL at 05:43

## 2023-08-11 RX ADMIN — Medication 50 MILLIGRAM(S): at 21:13

## 2023-08-11 RX ADMIN — Medication 500 MILLIGRAM(S): at 05:44

## 2023-08-11 RX ADMIN — Medication 60 MILLIGRAM(S): at 13:11

## 2023-08-11 RX ADMIN — SPIRONOLACTONE 50 MILLIGRAM(S): 25 TABLET, FILM COATED ORAL at 21:13

## 2023-08-11 RX ADMIN — POLYETHYLENE GLYCOL 3350 17 GRAM(S): 17 POWDER, FOR SOLUTION ORAL at 11:19

## 2023-08-11 RX ADMIN — Medication 60 MILLIGRAM(S): at 17:25

## 2023-08-11 RX ADMIN — Medication 1 PACKET(S): at 11:19

## 2023-08-11 RX ADMIN — HYDROMORPHONE HYDROCHLORIDE 2 MILLIGRAM(S): 2 INJECTION INTRAMUSCULAR; INTRAVENOUS; SUBCUTANEOUS at 03:30

## 2023-08-11 RX ADMIN — SPIRONOLACTONE 50 MILLIGRAM(S): 25 TABLET, FILM COATED ORAL at 11:24

## 2023-08-11 RX ADMIN — Medication 20 MILLIEQUIVALENT(S): at 08:44

## 2023-08-11 RX ADMIN — Medication 500 MILLIGRAM(S): at 17:24

## 2023-08-11 RX ADMIN — HYDROMORPHONE HYDROCHLORIDE 2 MILLIGRAM(S): 2 INJECTION INTRAMUSCULAR; INTRAVENOUS; SUBCUTANEOUS at 03:00

## 2023-08-11 RX ADMIN — Medication 81 MILLIGRAM(S): at 11:20

## 2023-08-11 RX ADMIN — AMIODARONE HYDROCHLORIDE 400 MILLIGRAM(S): 400 TABLET ORAL at 13:11

## 2023-08-11 RX ADMIN — Medication 1 PACKET(S): at 05:44

## 2023-08-11 RX ADMIN — AMIODARONE HYDROCHLORIDE 400 MILLIGRAM(S): 400 TABLET ORAL at 21:12

## 2023-08-11 RX ADMIN — Medication 20 MILLIGRAM(S): at 05:44

## 2023-08-11 RX ADMIN — AMIODARONE HYDROCHLORIDE 400 MILLIGRAM(S): 400 TABLET ORAL at 05:44

## 2023-08-11 RX ADMIN — CHLORHEXIDINE GLUCONATE 1 APPLICATION(S): 213 SOLUTION TOPICAL at 11:14

## 2023-08-11 RX ADMIN — ATORVASTATIN CALCIUM 80 MILLIGRAM(S): 80 TABLET, FILM COATED ORAL at 21:12

## 2023-08-11 RX ADMIN — Medication 1 PACKET(S): at 17:25

## 2023-08-11 RX ADMIN — SPIRONOLACTONE 50 MILLIGRAM(S): 25 TABLET, FILM COATED ORAL at 05:46

## 2023-08-11 RX ADMIN — GABAPENTIN 100 MILLIGRAM(S): 400 CAPSULE ORAL at 13:11

## 2023-08-11 NOTE — PROGRESS NOTE ADULT - SUBJECTIVE AND OBJECTIVE BOX
[facility] - Owen Campbell, Rodrigo, Endy, Layne, Mick Goel  Office Number:  [phone]    Patient resting comfortably in bed in NAD.  Laying flat with no respiratory distress.  No complaints of chest pain, dyspnea, palpitations, PND, or orthopnea.    F/U for:  AF    Telemetry:  Rate controlled AF    MEDICATIONS  (STANDING):  aMIOdarone    Tablet 400 milliGRAM(s) Oral every 8 hours  aMIOdarone    Tablet   Oral   apixaban 2.5 milliGRAM(s) Oral two times a day  ascorbic acid 500 milliGRAM(s) Oral two times a day  aspirin  chewable 81 milliGRAM(s) Oral daily  atorvastatin 80 milliGRAM(s) Oral at bedtime  bisacodyl Suppository 10 milliGRAM(s) Rectal once  chlorhexidine 2% Cloths 1 Application(s) Topical daily  dextrose 50% Injectable 50 milliLiter(s) IV Push every 15 minutes  dextrose 50% Injectable 25 milliLiter(s) IV Push every 15 minutes  diltiazem Infusion 8 mG/Hr (8 mL/Hr) IV Continuous <Continuous>  gabapentin 100 milliGRAM(s) Oral every 8 hours  insulin lispro (ADMELOG) corrective regimen sliding scale   SubCutaneous Before meals and at bedtime  melatonin 5 milliGRAM(s) Oral at bedtime  metoprolol tartrate 50 milliGRAM(s) Oral every 8 hours  pantoprazole  Injectable 40 milliGRAM(s) IV Push daily  polyethylene glycol 3350 17 Gram(s) Oral daily  potassium chloride    Tablet ER 20 milliEquivalent(s) Oral once  potassium chloride  10 mEq/50 mL IVPB 10 milliEquivalent(s) IV Intermittent every 1 hour  potassium chloride  10 mEq/50 mL IVPB 10 milliEquivalent(s) IV Intermittent every 1 hour  potassium chloride  10 mEq/50 mL IVPB 10 milliEquivalent(s) IV Intermittent every 1 hour  potassium phosphate / sodium phosphate Powder (PHOS-NaK) 1 Packet(s) Oral two times a day  psyllium Powder 1 Packet(s) Oral daily  senna 2 Tablet(s) Oral at bedtime  sodium chloride 0.9%. 1000 milliLiter(s) (10 mL/Hr) IV Continuous <Continuous>  spironolactone 50 milliGRAM(s) Oral <User Schedule>  torsemide 20 milliGRAM(s) Oral daily    MEDICATIONS  (PRN):  HYDROmorphone   Tablet 4 milliGRAM(s) Oral every 4 hours PRN Severe Pain (7 - 10)  HYDROmorphone   Tablet 2 milliGRAM(s) Oral every 3 hours PRN Moderate Pain (4 - 6)      Allergies    PC Pen VK (Rash)  adhesives (Rash)    Intolerances        Vital Signs Last 24 Hrs  T(C): 36.6 (10 Aug 2023 19:06), Max: 36.7 (10 Aug 2023 14:36)  T(F): 97.9 (10 Aug 2023 19:06), Max: 98 (10 Aug 2023 14:36)  HR: 78 (11 Aug 2023 00:34) (78 - 110)  BP: 118/71 (11 Aug 2023 00:34) (86/62 - 118/77)  BP(mean): 89 (11 Aug 2023 00:34) (73 - 93)  RR: 17 (11 Aug 2023 00:34) (17 - 19)  SpO2: 91% (11 Aug 2023 00:34) (90% - 92%)    Parameters below as of 11 Aug 2023 00:34  Patient On (Oxygen Delivery Method): nasal cannula  O2 Flow (L/min): 4      I&O's Summary    10 Aug 2023 07:01  -  11 Aug 2023 07:00  --------------------------------------------------------  IN: 1106 mL / OUT: 1625 mL / NET: -519 mL        ON EXAM:    Constitutional: NAD, awake  HEENT: Moist Mucous Membranes, Anicteric  Pulmonary: Decreased breath sounds b/l. No rales, crackles or wheeze appreciated.   Cardiovascular: IRRR tachy, S1 and S2, No murmurs, rubs, gallops or clicks  Gastrointestinal: Bowel Sounds present, soft, nontender.   Lymph: trace peripheral edema. No lymphadenopathy.  Skin: No visible rashes or ulcers.  Psych:  Mood & affect appropriate for situation      LABS: All Labs Reviewed:                        9.7    7.39  )-----------( 148      ( 11 Aug 2023 06:24 )             28.3                         9.8    10.56 )-----------( 152      ( 10 Aug 2023 07:13 )             29.6                         10.3   11.28 )-----------( 159      ( 09 Aug 2023 07:40 )             30.8     11 Aug 2023 06:23    136    |  99     |  20     ----------------------------<  122    3.7     |  24     |  1.10   10 Aug 2023 07:16    135    |  96     |  25     ----------------------------<  137    4.2     |  26     |  1.15   09 Aug 2023 07:36    134    |  98     |  23     ----------------------------<  164    4.5     |  24     |  1.21     Ca    8.5        11 Aug 2023 06:23  Ca    8.8        10 Aug 2023 07:16  Ca    8.5        09 Aug 2023 07:36  Phos  2.4       11 Aug 2023 06:23  Phos  2.6       10 Aug 2023 07:16  Phos  2.5       09 Aug 2023 07:36  Mg     2.0       11 Aug 2023 06:23  Mg     2.1       10 Aug 2023 07:16  Mg     2.4       09 Aug 2023 07:36    TPro  5.9    /  Alb  3.5    /  TBili  0.6    /  DBili  x      /  AST  13     /  ALT  14     /  AlkPhos  33     11 Aug 2023 06:23  TPro  6.4    /  Alb  3.8    /  TBili  0.5    /  DBili  x      /  AST  15     /  ALT  14     /  AlkPhos  38     10 Aug 2023 07:16  TPro  5.9    /  Alb  3.8    /  TBili  0.5    /  DBili  x      /  AST  20     /  ALT  14     /  AlkPhos  36     09 Aug 2023 07:36

## 2023-08-11 NOTE — PROGRESS NOTE ADULT - ASSESSMENT
Pt. with a history of HTN s/p CT angio which revealed CAD, followed by cardiac cath which revealed significant CAD and CABG recommended. Pt. very active, walks 4 miles daily, denies chest pain, denies palpitations denies dizziness and denies dyspnea on exertion.  8/7 CABG x 4 (LIMA-LAD, SVG-OM, SVG-Diag,  Vasopresin, fluid resuscitation  8/8 Transferred to [facility]  8/9 Rapid afib since last luis.  Amio load, increase lopressor.  Diuresis ,   Maintain ct   F/u lactate.  8/10  Pt in aflutter, hr 128,  multiple ivp lopressor given, hr unchanged. Started on cardizem infusion, hr 100-110.  maintain amio/lopressorEliquis started this am  8/11 VSS afib rate 70-90 Cardizem 8 mg /hr k 3.7 repleted 20 x1 ECHO completed  Normal LV / normal RV dispotion to michael e no skilled need

## 2023-08-11 NOTE — PROGRESS NOTE ADULT - SUBJECTIVE AND OBJECTIVE BOX
24H hour events: No acute overnight events    MEDICATIONS:  aMIOdarone    Tablet 400 milliGRAM(s) Oral every 8 hours  aMIOdarone    Tablet   Oral   apixaban 2.5 milliGRAM(s) Oral two times a day  aspirin  chewable 81 milliGRAM(s) Oral daily  diltiazem Infusion 8 mG/Hr IV Continuous <Continuous>  metoprolol tartrate 50 milliGRAM(s) Oral every 8 hours  spironolactone 50 milliGRAM(s) Oral <User Schedule>  torsemide 20 milliGRAM(s) Oral daily  gabapentin 100 milliGRAM(s) Oral every 8 hours  HYDROmorphone   Tablet 4 milliGRAM(s) Oral every 4 hours PRN  HYDROmorphone   Tablet 2 milliGRAM(s) Oral every 3 hours PRN  melatonin 5 milliGRAM(s) Oral at bedtime  bisacodyl Suppository 10 milliGRAM(s) Rectal once  pantoprazole  Injectable 40 milliGRAM(s) IV Push daily  polyethylene glycol 3350 17 Gram(s) Oral daily  psyllium Powder 1 Packet(s) Oral daily  senna 2 Tablet(s) Oral at bedtime  atorvastatin 80 milliGRAM(s) Oral at bedtime  dextrose 50% Injectable 25 milliLiter(s) IV Push every 15 minutes  dextrose 50% Injectable 50 milliLiter(s) IV Push every 15 minutes  insulin lispro (ADMELOG) corrective regimen sliding scale   SubCutaneous Before meals and at bedtime  ascorbic acid 500 milliGRAM(s) Oral two times a day  chlorhexidine 2% Cloths 1 Application(s) Topical daily  potassium chloride  10 mEq/50 mL IVPB 10 milliEquivalent(s) IV Intermittent every 1 hour  potassium chloride  10 mEq/50 mL IVPB 10 milliEquivalent(s) IV Intermittent every 1 hour  potassium chloride  10 mEq/50 mL IVPB 10 milliEquivalent(s) IV Intermittent every 1 hour  potassium phosphate / sodium phosphate Powder (PHOS-NaK) 1 Packet(s) Oral two times a day  sodium chloride 0.9%. 1000 milliLiter(s) IV Continuous <Continuous>      REVIEW OF SYSTEMS:  See HPI, otherwise ROS negative.    PHYSICAL EXAM:  T(C): 36.6 (08-10-23 @ 19:06), Max: 36.7 (08-10-23 @ 14:36)  HR: 78 (08-11-23 @ 00:34) (78 - 86)  BP: 118/71 (08-11-23 @ 00:34) (104/69 - 118/71)  RR: 17 (08-11-23 @ 00:34) (17 - 19)  SpO2: 91% (08-11-23 @ 00:34) (90% - 91%)  Wt(kg): --  I&O's Summary    10 Aug 2023 07:01  -  11 Aug 2023 07:00  --------------------------------------------------------  IN: 1106 mL / OUT: 1625 mL / NET: -519 mL    11 Aug 2023 07:01  -  11 Aug 2023 11:06  --------------------------------------------------------  IN: 120 mL / OUT: 750 mL / NET: -630 mL        Appearance: Alert. NAD	  Cardiovascular: +S1S2   Respiratory: CTA B/L	  Psychiatry: A & O x 3, Mood & affect appropriate  Skin: No rashes	  Neurologic: Non-focal  Extremities: No edema BLE  Vascular: Peripheral pulses palpable 2+ bilaterally      LABS:	 	    CBC Full  -  ( 11 Aug 2023 06:24 )  WBC Count : 7.39 K/uL  Hemoglobin : 9.7 g/dL  Hematocrit : 28.3 %  Platelet Count - Automated : 148 K/uL  Mean Cell Volume : 93.4 fl  Mean Cell Hemoglobin : 32.0 pg  Mean Cell Hemoglobin Concentration : 34.3 gm/dL  Auto Neutrophil # : 5.15 K/uL  Auto Lymphocyte # : 0.87 K/uL  Auto Monocyte # : 1.24 K/uL  Auto Eosinophil # : 0.08 K/uL  Auto Basophil # : 0.02 K/uL  Auto Neutrophil % : 69.6 %  Auto Lymphocyte % : 11.8 %  Auto Monocyte % : 16.8 %  Auto Eosinophil % : 1.1 %  Auto Basophil % : 0.3 %    08-11    136  |  99  |  20  ----------------------------<  122<H>  3.7   |  24  |  1.10  08-10    135  |  96  |  25<H>  ----------------------------<  137<H>  4.2   |  26  |  1.15    Ca    8.5      11 Aug 2023 06:23  Ca    8.8      10 Aug 2023 07:16  Phos  2.4     08-11  Phos  2.6     08-10  Mg     2.0     08-11  Mg     2.1     08-10    TPro  5.9<L>  /  Alb  3.5  /  TBili  0.6  /  DBili  x   /  AST  13  /  ALT  14  /  AlkPhos  33<L>  08-11  TPro  6.4  /  Alb  3.8  /  TBili  0.5  /  DBili  x   /  AST  15  /  ALT  14  /  AlkPhos  38<L>  08-10    TELEMETRY: AF/AFL 70-80 BPM

## 2023-08-11 NOTE — PROGRESS NOTE ADULT - ASSESSMENT
67 year old male PMH HTN, non-Hodgkin's lymphoma, SVT, BPH who presented after CT angio revealed 3vCAD, followed by cardiac cath which revealed significant severe 3vCAD who was sent to [facility] for planned CABG.   He is now POD 1, CABG x4 (LIMA-LAD, SVG-OM, SVG-Diag, SVG-PDA) now with post op afib.     #3vCAD s/p CABG:  - Continue ASA, high intensity statin  - Continue Lopressor 50q8  - cont on Torsemide, Aldactone to maintain negative balance  - may need additional diuretic today.   - Please continue to maintain strict I/Os, monitor daily weights, Cr, and K.   - Incentive spirometry encouraged   - H+H stable this AM       #Post op afib, CHADSVASc 3 (age, CAD, HTN). Patient has signed consent for PACeS trial, to be randomized today or tomorrow  - Af is now well controlled after adding dilt gtt.    - cont lopressor 50mg q8. can increase to q6 if bp tolerates  - Now on cardizem gtt  - cont on amio load  - EP consult called for potential DCCV.  Follow up recs  - Now on AC. cont eliquis per ctsx  - Monitor and replete electrolytes. Keep K>4.0 and Mg>2.0.     - Further cardiac workup will depend on clinical course.   - All other workup per primary team. Will followup.

## 2023-08-11 NOTE — PROGRESS NOTE ADULT - PROBLEM SELECTOR PLAN 2
8/8 Afib    amiodarone load     Cardizem GTT 8mg.hr  metoprolol 50 TID   Anticoagulation with Eliquis 2.5   Dr Love Ep  consulted

## 2023-08-11 NOTE — PROGRESS NOTE ADULT - ASSESSMENT
67 year old male PMH HTN, non-Hodgkin's lymphoma, SVT, BPH who presented after CT angio revealed 3vCAD, followed by cardiac cath which revealed significant severe 3v CAD who was sent to [facility] for planned CABG. Patient is not POD #3 and converted to AF/AFL with RVR 8/9 PM. EP consulted for AF management.    1. New onset atrial fibrillation/atrial flutter  2. S/p CABG POD #3     - Continue to monitor on telemetry  - Keep K>4 and Mg>2  - Recommend rate control with Cardizem gtt and Metoprolol 50mg TID  - Patient is currently on sub-therapeutic AC with Eliquis 2.5mg BID, would not attempt rhythm control until on full dose anticoagulation  - TTE reviewed, EF of 60-65%, LA normal     Amber Gomez PA-C  #[id number] 67 year old male PMH HTN, non-Hodgkin's lymphoma, SVT, BPH who presented after CT angio revealed 3vCAD, followed by cardiac cath which revealed significant severe 3v CAD who was sent to [facility] for planned CABG. Patient is not POD #3 and converted to AF/AFL with RVR 8/9 PM. EP consulted for AF management.    1. New onset atrial fibrillation/atrial flutter  2. S/p CABG POD #3     - Continue to monitor on telemetry  - Keep K>4 and Mg>2  - Recommend rate control with Cardizem gtt and Metoprolol 50mg TID  - Patient is currently on sub-therapeutic AC with Eliquis 2.5mg BID, would not attempt rhythm control until on full dose anticoagulation  - TTE reviewed, EF of 60-65%, LA normal     ADDENDUM:  - Plan to initiate AC Sunday 8/13, will plan for HAROON/DCCV Monday  - NPO after MN Sunday    BEHZAD ArredondoC  #[id number]

## 2023-08-11 NOTE — PROGRESS NOTE ADULT - SUBJECTIVE AND OBJECTIVE BOX
Subjective  ; hello I am feeling ok "    VITAL SIGNS    Telemetry:   afib 70    Vital Signs Last 24 Hrs  T(C): 36.6 (08-10-23 @ 19:06), Max: 36.7 (08-10-23 @ 14:36)  T(F): 97.9 (08-10-23 @ 19:06), Max: 98 (08-10-23 @ 14:36)  HR: 78 (23 @ 00:34) (78 - 110)  BP: 118/71 (23 @ 00:34) (86/62 - 118/77)  RR: 17 (23 @ 00:34) (17 - 19)  SpO2: 91% (23 @ 00:34) (90% - 92%)             08-10 @ 07:01  -   @ 07:00  --------------------------------------------------------  IN: 1106 mL / OUT: 1625 mL / NET: -519 mL    Daily Weight in k.8 (11 Aug 2023 07:30)                            9.7    7.39  )-----------( 148      ( 11 Aug 2023 06:24 )             28.3     0811    136  |  99  |  20  ----------------------------<  122<H>  3.7   |  24  |  1.10    Ca    8.5      11 Aug 2023 06:23  Phos  2.4     11  Mg     2.0         TPro  5.9<L>  /  Alb  3.5  /  TBili  0.6  /  DBili  x   /  AST  13  /  ALT  14  /  AlkPhos  33<L>  11        < from: TTE W or WO Ultrasound Enhancing Agent (08.10.23 @ 14:02) >  CONCLUSIONS:      1. Normal left ventricular cavity size. Left ventricular systolic function is normal.   2. Normal right ventricular cavity size and normal right ventricular systolic function.    < end of copied text >      MEDICATIONS  (STANDING):  aMIOdarone    Tablet 400 milliGRAM(s) Oral every 8 hours  aMIOdarone    Tablet   Oral   apixaban 2.5 milliGRAM(s) Oral two times a day  ascorbic acid 500 milliGRAM(s) Oral two times a day  aspirin  chewable 81 milliGRAM(s) Oral daily  atorvastatin 80 milliGRAM(s) Oral at bedtime  bisacodyl Suppository 10 milliGRAM(s) Rectal once  chlorhexidine 2% Cloths 1 Application(s) Topical daily  diltiazem Infusion 8 mG/Hr (8 mL/Hr) IV Continuous <Continuous>  gabapentin 100 milliGRAM(s) Oral every 8 hours  insulin lispro (ADMELOG) corrective regimen sliding scale   SubCutaneous Before meals and at bedtime  melatonin 5 milliGRAM(s) Oral at bedtime  metoprolol tartrate 50 milliGRAM(s) Oral every 8 hours  pantoprazole  Injectable 40 milliGRAM(s) IV Push daily  polyethylene glycol 3350 17 Gram(s) Oral daily  potassium chloride    Tablet ER 20 milliEquivalent(s) Oral once  potassium phosphate / sodium phosphate Powder (PHOS-NaK) 1 Packet(s) Oral two times a day  psyllium Powder 1 Packet(s) Oral daily  senna 2 Tablet(s) Oral at bedtime  sodium chloride 0.9%. 1000 milliLiter(s) (10 mL/Hr) IV Continuous <Continuous>  spironolactone 50 milliGRAM(s) Oral <User Schedule>  torsemide 20 milliGRAM(s) Oral daily    MEDICATIONS  (PRN):  HYDROmorphone   Tablet 4 milliGRAM(s) Oral every 4 hours PRN Severe Pain (7 - 10)  HYDROmorphone   Tablet 2 milliGRAM(s) Oral every 3 hours PRN Moderate Pain (4 - 6)      CAPILLARY BLOOD GLUCOSE      POCT Blood Glucose.: 122 mg/dL (11 Aug 2023 08:12)  POCT Blood Glucose.: 132 mg/dL (10 Aug 2023 20:42)  POCT Blood Glucose.: 121 mg/dL (10 Aug 2023 16:35)  POCT Blood Glucose.: 135 mg/dL (10 Aug 2023 11:39)              Pacing Wires        [x ]   Settings:     VVI 40                             Isolated  [  ]                                PHYSICAL EXAM        Neurology: alert and oriented x 3, nonfocal, no gross deficits    CV :S1 IRR    Sternal Wound :  CDI ,Sternum  Stable  + PW epm vvi 40     Lungs: B/l breath sounds dimishe dinbases supplemental oxygen nasal cannula 4L    Abdomen: soft, nontender, nondistended, positive bowel sounds, last bowel movement 8/10      : voids            Extremities:    warm well perfused equal strength throughout   B/lle  + Dp + edema left SVG ace wrapped                                       Physical Therapy Rec:   Home  [  x]   Home w/ PT  [  ]  Rehab  [  ]    Discussed with Cardiothoracic Team at AM rounds.

## 2023-08-12 LAB
ALBUMIN SERPL ELPH-MCNC: 3.8 G/DL — SIGNIFICANT CHANGE UP (ref 3.3–5)
ALP SERPL-CCNC: 38 U/L — LOW (ref 40–120)
ALT FLD-CCNC: 18 U/L — SIGNIFICANT CHANGE UP (ref 10–45)
ANION GAP SERPL CALC-SCNC: 13 MMOL/L — SIGNIFICANT CHANGE UP (ref 5–17)
AST SERPL-CCNC: 14 U/L — SIGNIFICANT CHANGE UP (ref 10–40)
BILIRUB SERPL-MCNC: 0.7 MG/DL — SIGNIFICANT CHANGE UP (ref 0.2–1.2)
BUN SERPL-MCNC: 24 MG/DL — HIGH (ref 7–23)
CALCIUM SERPL-MCNC: 9 MG/DL — SIGNIFICANT CHANGE UP (ref 8.4–10.5)
CHLORIDE SERPL-SCNC: 99 MMOL/L — SIGNIFICANT CHANGE UP (ref 96–108)
CO2 SERPL-SCNC: 24 MMOL/L — SIGNIFICANT CHANGE UP (ref 22–31)
CREAT SERPL-MCNC: 1.31 MG/DL — HIGH (ref 0.5–1.3)
EGFR: 59 ML/MIN/1.73M2 — LOW
GLUCOSE SERPL-MCNC: 145 MG/DL — HIGH (ref 70–99)
HCT VFR BLD CALC: 28.7 % — LOW (ref 39–50)
HGB BLD-MCNC: 9.6 G/DL — LOW (ref 13–17)
MAGNESIUM SERPL-MCNC: 2 MG/DL — SIGNIFICANT CHANGE UP (ref 1.6–2.6)
MCHC RBC-ENTMCNC: 30.9 PG — SIGNIFICANT CHANGE UP (ref 27–34)
MCHC RBC-ENTMCNC: 33.4 GM/DL — SIGNIFICANT CHANGE UP (ref 32–36)
MCV RBC AUTO: 92.3 FL — SIGNIFICANT CHANGE UP (ref 80–100)
NRBC # BLD: 0 /100 WBCS — SIGNIFICANT CHANGE UP (ref 0–0)
PLATELET # BLD AUTO: 212 K/UL — SIGNIFICANT CHANGE UP (ref 150–400)
POTASSIUM SERPL-MCNC: 3.7 MMOL/L — SIGNIFICANT CHANGE UP (ref 3.5–5.3)
POTASSIUM SERPL-SCNC: 3.7 MMOL/L — SIGNIFICANT CHANGE UP (ref 3.5–5.3)
PROT SERPL-MCNC: 6.4 G/DL — SIGNIFICANT CHANGE UP (ref 6–8.3)
RBC # BLD: 3.11 M/UL — LOW (ref 4.2–5.8)
RBC # FLD: 14.4 % — SIGNIFICANT CHANGE UP (ref 10.3–14.5)
SODIUM SERPL-SCNC: 136 MMOL/L — SIGNIFICANT CHANGE UP (ref 135–145)
WBC # BLD: 8.15 K/UL — SIGNIFICANT CHANGE UP (ref 3.8–10.5)
WBC # FLD AUTO: 8.15 K/UL — SIGNIFICANT CHANGE UP (ref 3.8–10.5)

## 2023-08-12 PROCEDURE — 99233 SBSQ HOSP IP/OBS HIGH 50: CPT

## 2023-08-12 RX ORDER — POTASSIUM CHLORIDE 20 MEQ
20 PACKET (EA) ORAL
Refills: 0 | Status: COMPLETED | OUTPATIENT
Start: 2023-08-12 | End: 2023-08-12

## 2023-08-12 RX ORDER — APIXABAN 2.5 MG/1
2.5 TABLET, FILM COATED ORAL ONCE
Refills: 0 | Status: COMPLETED | OUTPATIENT
Start: 2023-08-12 | End: 2023-08-12

## 2023-08-12 RX ORDER — APIXABAN 2.5 MG/1
5 TABLET, FILM COATED ORAL EVERY 12 HOURS
Refills: 0 | Status: DISCONTINUED | OUTPATIENT
Start: 2023-08-13 | End: 2023-08-15

## 2023-08-12 RX ADMIN — PANTOPRAZOLE SODIUM 40 MILLIGRAM(S): 20 TABLET, DELAYED RELEASE ORAL at 11:34

## 2023-08-12 RX ADMIN — Medication 20 MILLIEQUIVALENT(S): at 11:38

## 2023-08-12 RX ADMIN — AMIODARONE HYDROCHLORIDE 400 MILLIGRAM(S): 400 TABLET ORAL at 13:11

## 2023-08-12 RX ADMIN — Medication 60 MILLIGRAM(S): at 11:35

## 2023-08-12 RX ADMIN — Medication 50 MILLIGRAM(S): at 06:11

## 2023-08-12 RX ADMIN — GABAPENTIN 100 MILLIGRAM(S): 400 CAPSULE ORAL at 06:10

## 2023-08-12 RX ADMIN — APIXABAN 2.5 MILLIGRAM(S): 2.5 TABLET, FILM COATED ORAL at 06:12

## 2023-08-12 RX ADMIN — APIXABAN 2.5 MILLIGRAM(S): 2.5 TABLET, FILM COATED ORAL at 17:11

## 2023-08-12 RX ADMIN — Medication 500 MILLIGRAM(S): at 06:11

## 2023-08-12 RX ADMIN — Medication 60 MILLIGRAM(S): at 17:11

## 2023-08-12 RX ADMIN — AMIODARONE HYDROCHLORIDE 400 MILLIGRAM(S): 400 TABLET ORAL at 06:10

## 2023-08-12 RX ADMIN — GABAPENTIN 100 MILLIGRAM(S): 400 CAPSULE ORAL at 13:11

## 2023-08-12 RX ADMIN — Medication 60 MILLIGRAM(S): at 06:11

## 2023-08-12 RX ADMIN — Medication 81 MILLIGRAM(S): at 11:35

## 2023-08-12 RX ADMIN — Medication 1 PACKET(S): at 06:12

## 2023-08-12 RX ADMIN — Medication 20 MILLIEQUIVALENT(S): at 13:10

## 2023-08-12 RX ADMIN — SPIRONOLACTONE 50 MILLIGRAM(S): 25 TABLET, FILM COATED ORAL at 11:34

## 2023-08-12 RX ADMIN — Medication 50 MILLIGRAM(S): at 13:28

## 2023-08-12 RX ADMIN — SPIRONOLACTONE 50 MILLIGRAM(S): 25 TABLET, FILM COATED ORAL at 06:10

## 2023-08-12 RX ADMIN — Medication 60 MILLIGRAM(S): at 23:11

## 2023-08-12 RX ADMIN — CHLORHEXIDINE GLUCONATE 1 APPLICATION(S): 213 SOLUTION TOPICAL at 11:39

## 2023-08-12 RX ADMIN — Medication 50 MILLIGRAM(S): at 21:04

## 2023-08-12 RX ADMIN — AMIODARONE HYDROCHLORIDE 400 MILLIGRAM(S): 400 TABLET ORAL at 21:04

## 2023-08-12 RX ADMIN — ATORVASTATIN CALCIUM 80 MILLIGRAM(S): 80 TABLET, FILM COATED ORAL at 21:04

## 2023-08-12 RX ADMIN — Medication 20 MILLIGRAM(S): at 06:11

## 2023-08-12 RX ADMIN — SPIRONOLACTONE 50 MILLIGRAM(S): 25 TABLET, FILM COATED ORAL at 21:04

## 2023-08-12 NOTE — PROGRESS NOTE ADULT - PROBLEM SELECTOR PLAN 2
8/8 Afib    amiodarone load   - 10 gram  Cardizem  60 tid  metoprolol 50 TID   Anticoagulation with Eliquis 2.5 increase to 5 bid  8/13  Cardioversion for Monday 8/14   NPO sunday night  Dr Love Ep  consulted

## 2023-08-12 NOTE — SWALLOW BEDSIDE ASSESSMENT ADULT - SWALLOW EVAL: DIAGNOSIS
Pt is a 67yoM p/f CABGx4, now POD5, intubated only for procedure. Pt p/w overtly functional swallow profile. Swallow characterized by timely and efficient oral management, timely initiation of swallow, and no overt s/sx of aspiration/penetration with regular solids and thin liquids. Diet modification and objective swallow testing deemed not indicated a this time.

## 2023-08-12 NOTE — PROGRESS NOTE ADULT - SUBJECTIVE AND OBJECTIVE BOX
[facility] -- Adrian Garcia, Oewn Ayers, Endy Wallace Savella  Office # [phone]      Follow Up:  AF CABG    Subjective/Observations: Patient seen and examined. Events noted. Resting comfortably in bed. No complaints of chest pain, or palpitations reported. + dyspnea but stable      REVIEW OF SYSTEMS: All other review of systems is negative unless indicated above    PAST MEDICAL & SURGICAL HISTORY:  Hypertension      Ulcerative colitis      Supraventricular Tachycardia      Non-Hodgkin lymphoma  Treated with Chemotherapy and Radiation Therapy      Hyperlipidemia      History of BPH      Finger Injury  foreign body removed from right hand middle      Malignant neoplasm of lymph node  Excision of Malignant Left Femoral Lymph Node, 2010      H/O hernia repair          MEDICATIONS  (STANDING):  aMIOdarone    Tablet 400 milliGRAM(s) Oral every 8 hours  aMIOdarone    Tablet   Oral   aspirin  chewable 81 milliGRAM(s) Oral daily  atorvastatin 80 milliGRAM(s) Oral at bedtime  chlorhexidine 2% Cloths 1 Application(s) Topical daily  dextrose 50% Injectable 25 milliLiter(s) IV Push every 15 minutes  dextrose 50% Injectable 50 milliLiter(s) IV Push every 15 minutes  diltiazem    Tablet 60 milliGRAM(s) Oral every 6 hours  insulin lispro (ADMELOG) corrective regimen sliding scale   SubCutaneous Before meals and at bedtime  melatonin 5 milliGRAM(s) Oral at bedtime  metoprolol tartrate 50 milliGRAM(s) Oral every 8 hours  pantoprazole  Injectable 40 milliGRAM(s) IV Push daily  polyethylene glycol 3350 17 Gram(s) Oral daily  potassium chloride  10 mEq/50 mL IVPB 10 milliEquivalent(s) IV Intermittent every 1 hour  potassium chloride  10 mEq/50 mL IVPB 10 milliEquivalent(s) IV Intermittent every 1 hour  potassium chloride  10 mEq/50 mL IVPB 10 milliEquivalent(s) IV Intermittent every 1 hour  psyllium Powder 1 Packet(s) Oral daily  sodium chloride 0.9%. 1000 milliLiter(s) (10 mL/Hr) IV Continuous <Continuous>  spironolactone 50 milliGRAM(s) Oral <User Schedule>  torsemide 20 milliGRAM(s) Oral daily    MEDICATIONS  (PRN):  HYDROmorphone   Tablet 4 milliGRAM(s) Oral every 4 hours PRN Severe Pain (7 - 10)  HYDROmorphone   Tablet 2 milliGRAM(s) Oral every 3 hours PRN Moderate Pain (4 - 6)      Allergies    PC Pen VK (Rash)  adhesives (Rash)    Intolerances            Vital Signs Last 24 Hrs  T(C): 36.8 (12 Aug 2023 14:39), Max: 36.9 (12 Aug 2023 11:08)  T(F): 98.3 (12 Aug 2023 14:39), Max: 98.5 (12 Aug 2023 11:08)  HR: 80 (12 Aug 2023 17:10) (72 - 101)  BP: 111/67 (12 Aug 2023 17:10) (91/57 - 134/85)  BP(mean): 83 (12 Aug 2023 17:10) (70 - 104)  RR: 18 (12 Aug 2023 14:39) (18 - 20)  SpO2: 92% (12 Aug 2023 14:39) (90% - 94%)    Parameters below as of 12 Aug 2023 14:39  Patient On (Oxygen Delivery Method): room air        I&O's Summary    11 Aug 2023 07:01  -  12 Aug 2023 07:00  --------------------------------------------------------  IN: 120 mL / OUT: 2150 mL / NET: -2030 mL    12 Aug 2023 07:01  -  12 Aug 2023 19:33  --------------------------------------------------------  IN: 240 mL / OUT: 900 mL / NET: -660 mL          PHYSICAL EXAM:  TELE: AF/AFL 70s   Constitutional: NAD, awake    HEENT: Moist Mucous Membranes, Anicteric  Pulmonary: Decreased breath sounds b/l. + cravkles  Cardiovascular: Regular, S1 and S2, No murmurs, rubs, gallops or clicks  Gastrointestinal: Bowel Sounds present, soft, nontender.   Lymph: + peripheral edema. No lymphadenopathy.  Skin: No visible rashes or ulcers.  Psych:  Mood & affect appropriate for situation    LABS: All Labs Reviewed:                        9.6    8.15  )-----------( 212      ( 12 Aug 2023 07:12 )             28.7                         9.7    7.39  )-----------( 148      ( 11 Aug 2023 06:24 )             28.3                         9.8    10.56 )-----------( 152      ( 10 Aug 2023 07:13 )             29.6     12 Aug 2023 07:11    136    |  99     |  24     ----------------------------<  145    3.7     |  24     |  1.31   11 Aug 2023 06:23    136    |  99     |  20     ----------------------------<  122    3.7     |  24     |  1.10   10 Aug 2023 07:16    135    |  96     |  25     ----------------------------<  137    4.2     |  26     |  1.15     Ca    9.0        12 Aug 2023 07:11  Ca    8.5        11 Aug 2023 06:23  Ca    8.8        10 Aug 2023 07:16  Phos  2.4       11 Aug 2023 06:23  Phos  2.6       10 Aug 2023 07:16  Mg     2.0       12 Aug 2023 07:11  Mg     2.0       11 Aug 2023 06:23  Mg     2.1       10 Aug 2023 07:16    TPro  6.4    /  Alb  3.8    /  TBili  0.7    /  DBili  x      /  AST  14     /  ALT  18     /  AlkPhos  38     12 Aug 2023 07:11  TPro  5.9    /  Alb  3.5    /  TBili  0.6    /  DBili  x      /  AST  13     /  ALT  14     /  AlkPhos  33     11 Aug 2023 06:23  TPro  6.4    /  Alb  3.8    /  TBili  0.5    /  DBili  x      /  AST  15     /  ALT  14     /  AlkPhos  38     10 Aug 2023 07:16        - Troponin:

## 2023-08-12 NOTE — SWALLOW BEDSIDE ASSESSMENT ADULT - COMMENTS
hx con't  8/7 s/p CABG; pt intubated for procedure and extubated same day.  8/8 Transferred to step down. PT and OT recs for home.  8/9 rapid a-fib, EP called for potential DCCV; planned for Monday 8/14; NPO after midnight on Sunday.    CXR 8/10 FINDINGS: Low lung volumes. There is bilateral atelectasis. There are persistent bilateral pleural effusions. No pneumothorax.The heart is unable to be assessed.The visualized osseous structures demonstrate no acute pathology.    No prior SLP exams in [facility] or [facility] in PACS

## 2023-08-12 NOTE — CHART NOTE - NSCHARTNOTEFT_GEN_A_CORE
67 year old male PMH HTN, non-Hodgkin's lymphoma, SVT, BPH who presented after CT angio revealed 3vCAD, followed by cardiac cath which revealed significant severe 3v CAD who was sent to [facility] for planned CABG. Post operatively converted to AF/AFL with RVR 8/9 PM. EP consulted for AF management.    1. New onset atrial fibrillation/atrial flutter, post op  2. CAD s/p CABG    - Remains in AF 80's - 100's overnight/this AM   - Keep K>4 and Mg>2  - Recommend rate control with Cardizem gtt and Metoprolol 50mg TID  - Patient is currently on sub-therapeutic AC with Eliquis 2.5mg BID, plan for therapeutic AC on 8/13 PM with Eliquis 5mg bid.   - TTE reviewed, EF of 60-65%, LA normal   - Will plan for HAROON/DCCV Monday if remains in AF and on Eliquis 5mg bid. NPO after MN Sunday 8/13 67 year old male PMH HTN, non-Hodgkin's lymphoma, SVT, BPH who presented after CT angio revealed 3vCAD, followed by cardiac cath which revealed significant severe 3v CAD who was sent to [facility] for planned CABG. Post operatively converted to AF/AFL with RVR 8/9 PM. EP consulted for AF management.    1. New onset atrial fibrillation/atrial flutter, post op  2. CAD s/p CABG    - Remains in AF 80's - 100's overnight/this AM   - Keep K>4 and Mg>2  - Recommend rate control with Cardizem 60mg q6h and Metoprolol 50mg TID  - Patient is currently on sub-therapeutic AC with Eliquis 2.5mg BID, plan for therapeutic AC on 8/13 PM with Eliquis 5mg bid.   - TTE reviewed, EF of 60-65%, LA normal   - Will plan for HAROON/DCCV Monday if remains in AF and on Eliquis 5mg bid. NPO after MN Sunday 8/13

## 2023-08-12 NOTE — SWALLOW BEDSIDE ASSESSMENT ADULT - SLP GENERAL OBSERVATIONS
Pt received upright at bedside; +tele, AOx4, on room air, following all directives, verbally making all want/needs known. Pt expressing hx of globus sensation 1x/month prior to admission, and had undergone GI with no found impairments; otherwise no dysphagia hx (no coughing, throat clearing, meal intolerance) and no hx of PNA. Pt vocal quality clear and strong. ACP Sherlyn reporting no c/o dysphagia or aspiration. Pt reporting baseline intermittent dry cough when not eating/drinking post op, but not observed during this examination.

## 2023-08-12 NOTE — PROGRESS NOTE ADULT - SUBJECTIVE AND OBJECTIVE BOX
Subjective   " hello I am feeling well today'    VITAL SIGNS    Telemetry:  afib/alkutter    Vital Signs Last 24 Hrs  T(C): 36.4 (23 @ 07:03), Max: 36.8 (23 @ 12:39)  T(F): 97.5 (23 @ 07:03), Max: 98.2 (23 @ 12:39)  HR: 84 (23 @ 07:03) (73 - 101)  BP: 111/78 (23 @ 07:03) (94/58 - 134/85)  RR: 18 (23 @ 07:03) (18 - 20)  SpO2: 92% (23 @ 07:03) (90% - 93%)            @ 07:01  -   @ 07:00  --------------------------------------------------------  IN: 120 mL / OUT: 2150 mL / NET: -2030 mL     @ 07:01  -   @ 10:45  --------------------------------------------------------  IN: 240 mL / OUT: 900 mL / NET: -660 mL     Daily Weight in k.2 (12 Aug 2023 05:46)                        9.6    8.15  )-----------( 212      ( 12 Aug 2023 07:12 )             28.7       08    136  |  99  |  24<H>  ----------------------------<  145<H>  3.7   |  24  |  1.31<H>    Ca    9.0      12 Aug 2023 07:11  Phos  2.4     08-11  Mg     2.0         TPro  6.4  /  Alb  3.8  /  TBili  0.7  /  DBili  x   /  AST  14  /  ALT  18  /  AlkPhos  38<L>      MEDICATIONS  (STANDING):  aMIOdarone    Tablet   Oral   aMIOdarone    Tablet 400 milliGRAM(s) Oral every 8 hours  apixaban 2.5 milliGRAM(s) Oral two times a day  aspirin  chewable 81 milliGRAM(s) Oral daily  atorvastatin 80 milliGRAM(s) Oral at bedtime  chlorhexidine 2% Cloths 1 Application(s) Topical daily  diltiazem    Tablet 60 milliGRAM(s) Oral every 6 hours  gabapentin 100 milliGRAM(s) Oral every 8 hours  insulin lispro (ADMELOG) corrective regimen sliding scale   SubCutaneous Before meals and at bedtime  melatonin 5 milliGRAM(s) Oral at bedtime  metoprolol tartrate 50 milliGRAM(s) Oral every 8 hours  pantoprazole  Injectable 40 milliGRAM(s) IV Push daily  polyethylene glycol 3350 17 Gram(s) Oral daily  potassium chloride    Tablet ER 20 milliEquivalent(s) Oral every 2 hoursur  psyllium Powder 1 Packet(s) Oral daily  sodium chloride 0.9%. 1000 milliLiter(s) (10 mL/Hr) IV Continuous <Continuous>  spironolactone 50 milliGRAM(s) Oral <User Schedule>  torsemide 20 milliGRAM(s) Oral daily    MEDICATIONS  (PRN):  HYDROmorphone   Tablet 4 milliGRAM(s) Oral every 4 hours PRN Severe Pain (7 - 10)  HYDROmorphone   Tablet 2 milliGRAM(s) Oral every 3 hours PRN Moderate Pain (4 - 6)          Bilirubin Total: 0.7 mg/dL ( @ 07:11)    CAPILLARY BLOOD GLUCOSE      POCT Blood Glucose.: 103 mg/dL (12 Aug 2023 07:49)  POCT Blood Glucose.: 112 mg/dL (11 Aug 2023 20:57)  POCT Blood Glucose.: 133 mg/dL (11 Aug 2023 16:45)  POCT Blood Glucose.: 118 mg/dL (11 Aug 2023 11:40)              Pacing Wires      Isolated  [ x ]                                   PHYSICAL EXAM        Neurology: alert and oriented x 3, nonfocal, no gross deficits    CV : S1IRR    Sternal Wound : DADA sternum stable   + Pw x4 isolated    Lungs: B/l breath sounds on room air     Abdomen: soft, nontender, nondistended, positive bowel sounds,    :voids               Extremities:   LLE  svg  staples suture ace wrapped    B/lle + DP  + edema    equal strength throughout warm well perfused                                            Physical Therapy Rec:   Home  [  x]   Home w/ PT  [  ]  Rehab  [  ]    Discussed with Cardiothoracic Team at AM rounds.

## 2023-08-12 NOTE — PROGRESS NOTE ADULT - ASSESSMENT
Pt. with a history of HTN s/p CT angio which revealed CAD, followed by cardiac cath which revealed significant CAD and CABG recommended. Pt. very active, walks 4 miles daily, denies chest pain, denies palpitations denies dizziness and denies dyspnea on exertion.  8/7 CABG x 4 (LIMA-LAD, SVG-OM, SVG-Diag,  Vasopresin, fluid resuscitation  8/8 Transferred to sdu  8/9 Rapid afib since last luis.  Amio load, increase lopressor.  Diuresis ,   Maintain ct   F/u lactate.  8/10  Pt in aflutter, hr 128,  multiple ivp lopressor given, hr unchanged. Started on cardizem infusion, hr 100-110.  maintain amio/lopressorEliquis started this am  8/11 VSS afib rate 70-90 Cardizem 8 mg /hr k 3.7 repleted 20 x1 ECHO completed  Normal LV / normal RV disposition to home  no skilled need  8/12 VSS K 3.7  K20x2 given.   Continue amiodarone load -Cardizem 60 mg po q6 Eliuis  2.5 bid to increase to 5 BID on Sunday -  NPO at midnight  cardioversion on Monday

## 2023-08-12 NOTE — PROGRESS NOTE ADULT - ASSESSMENT
67 year old male PMH HTN, non-Hodgkin's lymphoma, SVT, BPH who presented after CT angio revealed 3vCAD, followed by cardiac cath which revealed significant severe 3vCAD who was sent to [facility] for planned CABG.   He is now POD 1, CABG x4 (LIMA-LAD, SVG-OM, SVG-Diag, SVG-PDA) now with post op afib.     #3vCAD s/p CABG:  - Continue ASA, high intensity statin  - cont on Torsemide, Aldactone to maintain negative balance  - cr mildly elevated. hold off on more diuresis.   - Please continue to maintain strict I/Os, monitor daily weights, Cr, and K.   - Incentive spirometry encouraged   - H+H stable this AM       #Post op afib, CHADSVASc 3 (age, CAD, HTN). Patient has signed consent for PACeS trial, to be randomized today or tomorrow  - Af is now well controlled after adding dilt gtt.    - cont lopressor 50mg q8. can increase to q6 if bp tolerates  - now of off cardizem gtt and tolerating po cardizem.   - cont on amio load  - EP  with potential DCCV mon  - Now on AC. cont eliquis per ctsx  - Monitor and replete electrolytes. Keep K>4.0 and Mg>2.0.     - Further cardiac workup will depend on clinical course.   - All other workup per primary team. Will followup.

## 2023-08-12 NOTE — SWALLOW BEDSIDE ASSESSMENT ADULT - SLP PERTINENT HISTORY OF CURRENT PROBLEM
67 year old male presents for CABG X 4. Pt. with a history of HTN s/p CT angio which revealed CAD, followed by cardiac cath which revealed significant CAD and CABG recommended.

## 2023-08-13 LAB
ALBUMIN SERPL ELPH-MCNC: 3.3 G/DL — SIGNIFICANT CHANGE UP (ref 3.3–5)
ALP SERPL-CCNC: 35 U/L — LOW (ref 40–120)
ALT FLD-CCNC: 21 U/L — SIGNIFICANT CHANGE UP (ref 10–45)
ANION GAP SERPL CALC-SCNC: 14 MMOL/L — SIGNIFICANT CHANGE UP (ref 5–17)
AST SERPL-CCNC: 12 U/L — SIGNIFICANT CHANGE UP (ref 10–40)
BILIRUB SERPL-MCNC: 0.6 MG/DL — SIGNIFICANT CHANGE UP (ref 0.2–1.2)
BUN SERPL-MCNC: 23 MG/DL — SIGNIFICANT CHANGE UP (ref 7–23)
CALCIUM SERPL-MCNC: 8.4 MG/DL — SIGNIFICANT CHANGE UP (ref 8.4–10.5)
CHLORIDE SERPL-SCNC: 100 MMOL/L — SIGNIFICANT CHANGE UP (ref 96–108)
CO2 SERPL-SCNC: 22 MMOL/L — SIGNIFICANT CHANGE UP (ref 22–31)
CREAT SERPL-MCNC: 1.25 MG/DL — SIGNIFICANT CHANGE UP (ref 0.5–1.3)
EGFR: 62 ML/MIN/1.73M2 — SIGNIFICANT CHANGE UP
GLUCOSE SERPL-MCNC: 130 MG/DL — HIGH (ref 70–99)
HCT VFR BLD CALC: 26.2 % — LOW (ref 39–50)
HGB BLD-MCNC: 8.9 G/DL — LOW (ref 13–17)
MCHC RBC-ENTMCNC: 31.8 PG — SIGNIFICANT CHANGE UP (ref 27–34)
MCHC RBC-ENTMCNC: 34 GM/DL — SIGNIFICANT CHANGE UP (ref 32–36)
MCV RBC AUTO: 93.6 FL — SIGNIFICANT CHANGE UP (ref 80–100)
NRBC # BLD: 0 /100 WBCS — SIGNIFICANT CHANGE UP (ref 0–0)
PLATELET # BLD AUTO: 195 K/UL — SIGNIFICANT CHANGE UP (ref 150–400)
POTASSIUM SERPL-MCNC: 3.8 MMOL/L — SIGNIFICANT CHANGE UP (ref 3.5–5.3)
POTASSIUM SERPL-SCNC: 3.8 MMOL/L — SIGNIFICANT CHANGE UP (ref 3.5–5.3)
PROT SERPL-MCNC: 5.7 G/DL — LOW (ref 6–8.3)
RBC # BLD: 2.8 M/UL — LOW (ref 4.2–5.8)
RBC # FLD: 14.3 % — SIGNIFICANT CHANGE UP (ref 10.3–14.5)
SODIUM SERPL-SCNC: 136 MMOL/L — SIGNIFICANT CHANGE UP (ref 135–145)
WBC # BLD: 6.72 K/UL — SIGNIFICANT CHANGE UP (ref 3.8–10.5)
WBC # FLD AUTO: 6.72 K/UL — SIGNIFICANT CHANGE UP (ref 3.8–10.5)

## 2023-08-13 PROCEDURE — 99233 SBSQ HOSP IP/OBS HIGH 50: CPT

## 2023-08-13 PROCEDURE — 71045 X-RAY EXAM CHEST 1 VIEW: CPT | Mod: 26

## 2023-08-13 RX ORDER — POTASSIUM BICARBONATE 978 MG/1
25 TABLET, EFFERVESCENT ORAL
Refills: 0 | Status: COMPLETED | OUTPATIENT
Start: 2023-08-13 | End: 2023-08-13

## 2023-08-13 RX ORDER — POTASSIUM CHLORIDE 20 MEQ
20 PACKET (EA) ORAL ONCE
Refills: 0 | Status: COMPLETED | OUTPATIENT
Start: 2023-08-13 | End: 2023-08-13

## 2023-08-13 RX ORDER — AMIODARONE HYDROCHLORIDE 400 MG/1
TABLET ORAL
Refills: 0 | Status: DISCONTINUED | OUTPATIENT
Start: 2023-08-13 | End: 2023-08-15

## 2023-08-13 RX ORDER — AMIODARONE HYDROCHLORIDE 400 MG/1
400 TABLET ORAL EVERY 8 HOURS
Refills: 0 | Status: DISCONTINUED | OUTPATIENT
Start: 2023-08-13 | End: 2023-08-15

## 2023-08-13 RX ADMIN — CHLORHEXIDINE GLUCONATE 1 APPLICATION(S): 213 SOLUTION TOPICAL at 12:54

## 2023-08-13 RX ADMIN — Medication 60 MILLIGRAM(S): at 11:23

## 2023-08-13 RX ADMIN — Medication 20 MILLIGRAM(S): at 06:09

## 2023-08-13 RX ADMIN — SPIRONOLACTONE 50 MILLIGRAM(S): 25 TABLET, FILM COATED ORAL at 06:10

## 2023-08-13 RX ADMIN — APIXABAN 5 MILLIGRAM(S): 2.5 TABLET, FILM COATED ORAL at 06:10

## 2023-08-13 RX ADMIN — SPIRONOLACTONE 50 MILLIGRAM(S): 25 TABLET, FILM COATED ORAL at 11:23

## 2023-08-13 RX ADMIN — POTASSIUM BICARBONATE 25 MILLIEQUIVALENT(S): 978 TABLET, EFFERVESCENT ORAL at 08:01

## 2023-08-13 RX ADMIN — Medication 60 MILLIGRAM(S): at 23:11

## 2023-08-13 RX ADMIN — POTASSIUM BICARBONATE 25 MILLIEQUIVALENT(S): 978 TABLET, EFFERVESCENT ORAL at 10:07

## 2023-08-13 RX ADMIN — Medication 60 MILLIGRAM(S): at 17:11

## 2023-08-13 RX ADMIN — APIXABAN 5 MILLIGRAM(S): 2.5 TABLET, FILM COATED ORAL at 17:10

## 2023-08-13 RX ADMIN — PANTOPRAZOLE SODIUM 40 MILLIGRAM(S): 20 TABLET, DELAYED RELEASE ORAL at 11:23

## 2023-08-13 RX ADMIN — Medication 50 MILLIGRAM(S): at 06:10

## 2023-08-13 RX ADMIN — Medication 60 MILLIGRAM(S): at 06:10

## 2023-08-13 RX ADMIN — AMIODARONE HYDROCHLORIDE 400 MILLIGRAM(S): 400 TABLET ORAL at 21:35

## 2023-08-13 RX ADMIN — AMIODARONE HYDROCHLORIDE 200 MILLIGRAM(S): 400 TABLET ORAL at 06:10

## 2023-08-13 RX ADMIN — AMIODARONE HYDROCHLORIDE 400 MILLIGRAM(S): 400 TABLET ORAL at 13:11

## 2023-08-13 RX ADMIN — Medication 20 MILLIEQUIVALENT(S): at 08:01

## 2023-08-13 RX ADMIN — Medication 50 MILLIGRAM(S): at 21:35

## 2023-08-13 RX ADMIN — ATORVASTATIN CALCIUM 80 MILLIGRAM(S): 80 TABLET, FILM COATED ORAL at 21:35

## 2023-08-13 RX ADMIN — Medication 81 MILLIGRAM(S): at 11:23

## 2023-08-13 NOTE — PROGRESS NOTE ADULT - ASSESSMENT
Pt. with a history of HTN s/p CT angio which revealed CAD, followed by cardiac cath which revealed significant CAD and CABG recommended. Pt. very active, walks 4 miles daily, denies chest pain, denies palpitations denies dizziness and denies dyspnea on exertion.  8/7 CABG x 4 (LIMA-LAD, SVG-OM, SVG-Diag,  Vasopresin, fluid resuscitation  8/8 Transferred to sdu  8/9 Rapid afib since last luis.  Amio load, increase lopressor.  Diuresis ,   Maintain ct   F/u lactate.  8/10  Pt in aflutter, hr 128,  multiple ivp lopressor given, hr unchanged. Started on cardizem infusion, hr 100-110.  maintain amio/lopressorEliquis started this am  8/11 VSS afib rate 70-90 Cardizem 8 mg /hr k 3.7 repleted 20 x1 ECHO completed  Normal LV / normal RV disposition to home  no skilled need  8/12 VSS K 3.7  K20x2 given.   Continue amiodarone load -Cardizem 60 mg po q6 Eliuis  2.5 bid to increase to 5 BID on Sunday -  NPO at midnight  cardioversion on Monday 8/13 VSS  amiodarone load to 10 grams per  Dr Altamirano  NPO at midnight Type and screen  PTT/INR in am.           Pt. with a history of HTN s/p CT angio which revealed CAD, followed by cardiac cath which revealed significant CAD and CABG recommended. Pt. very active, walks 4 miles daily, denies chest pain, denies palpitations denies dizziness and denies dyspnea on exertion.  8/7 CABG x 4 (LIMA-LAD, SVG-OM, SVG-Diag,  Vasopresin, fluid resuscitation  8/8 Transferred to sdu  8/9 Rapid afib since last luis.  Amio load, increase lopressor.  Diuresis ,   Maintain ct   F/u lactate.  8/10  Pt in aflutter, hr 128,  multiple ivp lopressor given, hr unchanged. Started on cardizem infusion, hr 100-110.  maintain amio/lopressorEliquis started this am  8/11 VSS afib rate 70-90 Cardizem 8 mg /hr k 3.7 repleted 20 x1 ECHO completed  Normal LV / normal RV disposition to home  no skilled need  8/12 VSS K 3.7  K20x2 given.   Continue amiodarone load -Cardizem 60 mg po q6 Eliuis  2.5 bid to increase to 5 BID on Sunday -  NPO at midnight  cardioversion on Monday 8/13 VSS   K 3.8 kltye 25 x2  amiodarone load to 10 grams per  Dr Altamirano  NPO at midnight Type and screen  PTT/INR in am.

## 2023-08-13 NOTE — PROGRESS NOTE ADULT - SUBJECTIVE AND OBJECTIVE BOX
[facility] -- Adrian Garcia, Armen, Owen, Endy Wallace Savella  Office # [phone]      Follow Up:  AF CAD    Subjective/Observations: Patient seen and examined. Events noted. Resting comfortably in bed. Mild dyspnea that is improving. palp controlled. no sig cp      REVIEW OF SYSTEMS: All other review of systems is negative unless indicated above    PAST MEDICAL & SURGICAL HISTORY:  Hypertension      Ulcerative colitis      Supraventricular Tachycardia      Non-Hodgkin lymphoma  Treated with Chemotherapy and Radiation Therapy      Hyperlipidemia      History of BPH      Finger Injury  foreign body removed from right hand middle      Malignant neoplasm of lymph node  Excision of Malignant Left Femoral Lymph Node, 2010      H/O hernia repair          MEDICATIONS  (STANDING):  aMIOdarone    Tablet   Oral   aMIOdarone    Tablet 400 milliGRAM(s) Oral every 8 hours  apixaban 5 milliGRAM(s) Oral every 12 hours  aspirin  chewable 81 milliGRAM(s) Oral daily  atorvastatin 80 milliGRAM(s) Oral at bedtime  chlorhexidine 2% Cloths 1 Application(s) Topical daily  dextrose 50% Injectable 25 milliLiter(s) IV Push every 15 minutes  dextrose 50% Injectable 50 milliLiter(s) IV Push every 15 minutes  diltiazem    Tablet 60 milliGRAM(s) Oral every 6 hours  insulin lispro (ADMELOG) corrective regimen sliding scale   SubCutaneous Before meals and at bedtime  melatonin 5 milliGRAM(s) Oral at bedtime  metoprolol tartrate 50 milliGRAM(s) Oral every 8 hours  pantoprazole  Injectable 40 milliGRAM(s) IV Push daily  polyethylene glycol 3350 17 Gram(s) Oral daily  potassium bicarbonate/potassium citrate 25 milliEquivalent(s) Oral every 1 hour  psyllium Powder 1 Packet(s) Oral daily  spironolactone 50 milliGRAM(s) Oral <User Schedule>  torsemide 20 milliGRAM(s) Oral daily    MEDICATIONS  (PRN):  HYDROmorphone   Tablet 2 milliGRAM(s) Oral every 3 hours PRN Moderate Pain (4 - 6)  HYDROmorphone   Tablet 4 milliGRAM(s) Oral every 4 hours PRN Severe Pain (7 - 10)      Allergies    PC Pen VK (Rash)  adhesives (Rash)    Intolerances            Vital Signs Last 24 Hrs  T(C): 36.4 (13 Aug 2023 06:58), Max: 37.1 (12 Aug 2023 23:12)  T(F): 97.6 (13 Aug 2023 06:58), Max: 98.8 (12 Aug 2023 23:12)  HR: 81 (13 Aug 2023 06:58) (72 - 97)  BP: 110/66 (13 Aug 2023 06:58) (91/57 - 113/76)  BP(mean): 83 (13 Aug 2023 06:58) (70 - 89)  RR: 18 (13 Aug 2023 06:58) (18 - 18)  SpO2: 94% (13 Aug 2023 06:58) (92% - 94%)    Parameters below as of 13 Aug 2023 06:58  Patient On (Oxygen Delivery Method): room air        I&O's Summary    12 Aug 2023 07:01  -  13 Aug 2023 07:00  --------------------------------------------------------  IN: 480 mL / OUT: 1600 mL / NET: -1120 mL          PHYSICAL EXAM:  TELE: [facility] 70-90  Constitutional: NAD, awake and alert, well-developed  HEENT: Moist Mucous Membranes, Anicteric  Pulmonary: Decreased breath sounds b/l. No rales, crackles or wheeze appreciated.   Cardiovascular: IRRR, S1 and S2, No murmurs, rubs, gallops or clicks  Gastrointestinal: Bowel Sounds present, soft, nontender.   Lymph: No peripheral edema. No lymphadenopathy.  Skin: No visible rashes or ulcers.  Psych:  Mood & affect appropriate for situation    LABS: All Labs Reviewed:                        8.9    6.72  )-----------( 195      ( 13 Aug 2023 07:12 )             26.2                         9.6    8.15  )-----------( 212      ( 12 Aug 2023 07:12 )             28.7                         9.7    7.39  )-----------( 148      ( 11 Aug 2023 06:24 )             28.3     13 Aug 2023 07:07    136    |  100    |  23     ----------------------------<  130    3.8     |  22     |  1.25   12 Aug 2023 07:11    136    |  99     |  24     ----------------------------<  145    3.7     |  24     |  1.31   11 Aug 2023 06:23    136    |  99     |  20     ----------------------------<  122    3.7     |  24     |  1.10     Ca    8.4        13 Aug 2023 07:07  Ca    9.0        12 Aug 2023 07:11  Ca    8.5        11 Aug 2023 06:23  Phos  2.4       11 Aug 2023 06:23  Mg     2.0       12 Aug 2023 07:11  Mg     2.0       11 Aug 2023 06:23    TPro  5.7    /  Alb  3.3    /  TBili  0.6    /  DBili  x      /  AST  12     /  ALT  21     /  AlkPhos  35     13 Aug 2023 07:07  TPro  6.4    /  Alb  3.8    /  TBili  0.7    /  DBili  x      /  AST  14     /  ALT  18     /  AlkPhos  38     12 Aug 2023 07:11  TPro  5.9    /  Alb  3.5    /  TBili  0.6    /  DBili  x      /  AST  13     /  ALT  14     /  AlkPhos  33     11 Aug 2023 06:23        - Troponin:

## 2023-08-13 NOTE — PROGRESS NOTE ADULT - PROBLEM SELECTOR PLAN 2
8/8 Afib    amiodarone load   - 10 gram  Cardizem  60 tid  metoprolol 50 TID   Anticoagulation with Eliquis 2.5 increase to 5 bid  8/13  Cardioversion for Monday 8/14   NPO Sunday night  PTT/INR Type an dscreen  Dr Love Ep  consulted 8/8 Afib    amiodarone load   - 10 gram  Cardizem  60 tid  metoprolol 50 TID   Anticoagulation with Eliquis 2.5 increased to 5 bid  8/13  Cardioversion for Monday 8/14   NPO Sunday night  PTT/INR Type and screen  Dr Love Ep  consulted

## 2023-08-13 NOTE — PROGRESS NOTE ADULT - SUBJECTIVE AND OBJECTIVE BOX
Subjective " hello Henry ok today"    VITAL SIGNS    Telemetry:   aflutter 70-90    Vital Signs Last 24 Hrs  T(C): 37.1 (08-12-23 @ 23:12), Max: 37.1 (08-12-23 @ 23:12)  T(F): 98.8 (08-12-23 @ 23:12), Max: 98.8 (08-12-23 @ 23:12)  HR: 92 (08-12-23 @ 23:12) (72 - 97)  BP: 111/70 (08-12-23 @ 23:12) (91/57 - 113/76)  RR: 18 (08-12-23 @ 23:12) (18 - 18)  SpO2: 94% (08-12-23 @ 23:12) (92% - 94%)           08-12 @ 07:01  -  08-13 @ 07:00  --------------------------------------------------------  IN: 480 mL / OUT: 1600 mL / NET: -1120 mL          MEDICATIONS  (STANDING):  aMIOdarone    Tablet   Oral   aMIOdarone    Tablet 200 milliGRAM(s) Oral daily  apixaban 5 milliGRAM(s) Oral every 12 hours  aspirin  chewable 81 milliGRAM(s) Oral daily  atorvastatin 80 milliGRAM(s) Oral at bedtime  chlorhexidine 2% Cloths 1 Application(s) Topical daily  dextrose 50% Injectable 25 milliLiter(s) IV Push every 15 minutes  dextrose 50% Injectable 50 milliLiter(s) IV Push every 15 minutes  diltiazem    Tablet 60 milliGRAM(s) Oral every 6 hours  insulin lispro (ADMELOG) corrective regimen sliding scale   SubCutaneous Before meals and at bedtime  melatonin 5 milliGRAM(s) Oral at bedtime  metoprolol tartrate 50 milliGRAM(s) Oral every 8 hours  pantoprazole  Injectable 40 milliGRAM(s) IV Push daily  polyethylene glycol 3350 17 Gram(s) Oral daily  psyllium Powder 1 Packet(s) Oral daily  sodium chloride 0.9%. 1000 milliLiter(s) (10 mL/Hr) IV Continuous <Continuous>  spironolactone 50 milliGRAM(s) Oral <User Schedule>  torsemide 20 milliGRAM(s) Oral daily    MEDICATIONS  (PRN):  HYDROmorphone   Tablet 4 milliGRAM(s) Oral every 4 hours PRN Severe Pain (7 - 10)  HYDROmorphone   Tablet 2 milliGRAM(s) Oral every 3 hours PRN Moderate Pain (4 - 6)      POCT Blood Glucose.: 150 mg/dL (12 Aug 2023 20:50)  POCT Blood Glucose.: 121 mg/dL (12 Aug 2023 16:41)  POCT Blood Glucose.: 121 mg/dL (12 Aug 2023 11:24)  POCT Blood Glucose.: 103 mg/dL (12 Aug 2023 07:49)              Pacing Wires        [  ]   Settings:                                  Isolated  [x  ]                                 PHYSICAL EXAM        Neurology: alert and oriented x 3, nonfocal, no gross deficits    CV : [id number]    Sternal Wound :   DADA sternum  Stable  + PW isolated x4    Lungs: B/l esy breath sound sroomair    Abdomen: soft, nontender, nondistended, positive bowel sounds,  8/12     :   voids            Extremities:    warm well perfused equal strength throughout   B/lle + DP + edema  LLE  SVG staples ace wrapped                                      Physical Therapy Rec:   Home  [  x]     Discussed with Cardiothoracic Team at AM rounds. Subjective " hello Henry ok today"    VITAL SIGNS    Telemetry:   aflutter 70-90    Vital Signs Last 24 Hrs  T(C): 37.1 (08-12-23 @ 23:12), Max: 37.1 (08-12-23 @ 23:12)  T(F): 98.8 (08-12-23 @ 23:12), Max: 98.8 (08-12-23 @ 23:12)  HR: 92 (08-12-23 @ 23:12) (72 - 97)  BP: 111/70 (08-12-23 @ 23:12) (91/57 - 113/76)  RR: 18 (08-12-23 @ 23:12) (18 - 18)  SpO2: 94% (08-12-23 @ 23:12) (92% - 94%)           08-12 @ 07:01  -  08-13 @ 07:00  --------------------------------------------------------  IN: 480 mL / OUT: 1600 mL / NET: -1120 mL                          8.9    6.72  )-----------( 195      ( 13 Aug 2023 07:12 )             26.2       08-13    136  |  100  |  23  ----------------------------<  130<H>  3.8   |  22  |  1.25    Ca    8.4      13 Aug 2023 07:07  Mg     2.0     08-12    TPro  5.7<L>  /  Alb  3.3  /  TBili  0.6  /  DBili  x   /  AST  12  /  ALT  21  /  AlkPhos  35<L>  08-13            MEDICATIONS  (STANDING):  aMIOdarone    Tablet   Oral   aMIOdarone    Tablet 200 milliGRAM(s) Oral daily  apixaban 5 milliGRAM(s) Oral every 12 hours  aspirin  chewable 81 milliGRAM(s) Oral daily  atorvastatin 80 milliGRAM(s) Oral at bedtime  chlorhexidine 2% Cloths 1 Application(s) Topical daily  dextrose 50% Injectable 25 milliLiter(s) IV Push every 15 minutes  dextrose 50% Injectable 50 milliLiter(s) IV Push every 15 minutes  diltiazem    Tablet 60 milliGRAM(s) Oral every 6 hours  insulin lispro (ADMELOG) corrective regimen sliding scale   SubCutaneous Before meals and at bedtime  melatonin 5 milliGRAM(s) Oral at bedtime  metoprolol tartrate 50 milliGRAM(s) Oral every 8 hours  pantoprazole  Injectable 40 milliGRAM(s) IV Push daily  polyethylene glycol 3350 17 Gram(s) Oral daily  psyllium Powder 1 Packet(s) Oral daily  sodium chloride 0.9%. 1000 milliLiter(s) (10 mL/Hr) IV Continuous <Continuous>  spironolactone 50 milliGRAM(s) Oral <User Schedule>  torsemide 20 milliGRAM(s) Oral daily    MEDICATIONS  (PRN):  HYDROmorphone   Tablet 4 milliGRAM(s) Oral every 4 hours PRN Severe Pain (7 - 10)  HYDROmorphone   Tablet 2 milliGRAM(s) Oral every 3 hours PRN Moderate Pain (4 - 6)      POCT Blood Glucose.: 150 mg/dL (12 Aug 2023 20:50)  POCT Blood Glucose.: 121 mg/dL (12 Aug 2023 16:41)  POCT Blood Glucose.: 121 mg/dL (12 Aug 2023 11:24)  POCT Blood Glucose.: 103 mg/dL (12 Aug 2023 07:49)              Pacing Wires        [  ]   Settings:                                  Isolated  [x  ]                                 PHYSICAL EXAM        Neurology: alert and oriented x 3, nonfocal, no gross deficits    CV : [id number]    Sternal Wound :   DADA sternum  Stable  + PW isolated x4    Lungs: B/l esy breath sound sroomair    Abdomen: soft, nontender, nondistended, positive bowel sounds,  8/12     :   voids            Extremities:    warm well perfused equal strength throughout   B/lle + DP + edema  LLE  SVG staples ace wrapped                                      Physical Therapy Rec:   Home  [  x]     Discussed with Cardiothoracic Team at AM rounds.

## 2023-08-13 NOTE — PROGRESS NOTE ADULT - ASSESSMENT
67 year old male PMH HTN, non-Hodgkin's lymphoma, SVT, BPH who presented after CT angio revealed 3vCAD, followed by cardiac cath which revealed significant severe 3vCAD who was sent to [facility] for planned CABG.   He is now POD 1, CABG x4 (LIMA-LAD, SVG-OM, SVG-Diag, SVG-PDA) now with post op afib.     #3vCAD s/p CABG:  - Continue ASA, high intensity statin  - cont on Torsemide, Aldactone to maintain negative balance  - cr mildly elevated. hold off on more diuresis.   - Please continue to maintain strict I/Os, monitor daily weights, Cr, and K.   - Incentive spirometry encouraged   - H+H stable this AM       #Post op afib, CHADSVASc 3 (age, CAD, HTN). Patient has signed consent for PACeS trial, to be randomized today or tomorrow  - Af is now well controlled after adding dilt  .    - cont lopressor 50mg q8.    - tolerating po cardizem.   - cont on amio load  - EP  with potential DCCV mon  - Now on AC. cont eliquis per ctsx  - Monitor and replete electrolytes. Keep K>4.0 and Mg>2.0.     - Further cardiac workup will depend on clinical course.   - All other workup per primary team. Will followup.

## 2023-08-14 ENCOUNTER — TRANSCRIPTION ENCOUNTER (OUTPATIENT)
Age: 68
End: 2023-08-14

## 2023-08-14 LAB
ALBUMIN SERPL ELPH-MCNC: 3.4 G/DL — SIGNIFICANT CHANGE UP (ref 3.3–5)
ALP SERPL-CCNC: 39 U/L — LOW (ref 40–120)
ALT FLD-CCNC: 24 U/L — SIGNIFICANT CHANGE UP (ref 10–45)
ANION GAP SERPL CALC-SCNC: 13 MMOL/L — SIGNIFICANT CHANGE UP (ref 5–17)
APTT BLD: 32.3 SEC — SIGNIFICANT CHANGE UP (ref 24.5–35.6)
AST SERPL-CCNC: 14 U/L — SIGNIFICANT CHANGE UP (ref 10–40)
BILIRUB SERPL-MCNC: 0.6 MG/DL — SIGNIFICANT CHANGE UP (ref 0.2–1.2)
BLD GP AB SCN SERPL QL: NEGATIVE — SIGNIFICANT CHANGE UP
BUN SERPL-MCNC: 22 MG/DL — SIGNIFICANT CHANGE UP (ref 7–23)
CALCIUM SERPL-MCNC: 8.6 MG/DL — SIGNIFICANT CHANGE UP (ref 8.4–10.5)
CHLORIDE SERPL-SCNC: 101 MMOL/L — SIGNIFICANT CHANGE UP (ref 96–108)
CO2 SERPL-SCNC: 24 MMOL/L — SIGNIFICANT CHANGE UP (ref 22–31)
CREAT SERPL-MCNC: 1.34 MG/DL — HIGH (ref 0.5–1.3)
EGFR: 57 ML/MIN/1.73M2 — LOW
GLUCOSE SERPL-MCNC: 115 MG/DL — HIGH (ref 70–99)
HCT VFR BLD CALC: 27.2 % — LOW (ref 39–50)
HGB BLD-MCNC: 8.9 G/DL — LOW (ref 13–17)
INR BLD: 1.65 RATIO — HIGH (ref 0.85–1.18)
MCHC RBC-ENTMCNC: 31.6 PG — SIGNIFICANT CHANGE UP (ref 27–34)
MCHC RBC-ENTMCNC: 32.7 GM/DL — SIGNIFICANT CHANGE UP (ref 32–36)
MCV RBC AUTO: 96.5 FL — SIGNIFICANT CHANGE UP (ref 80–100)
NRBC # BLD: 0 /100 WBCS — SIGNIFICANT CHANGE UP (ref 0–0)
PLATELET # BLD AUTO: 231 K/UL — SIGNIFICANT CHANGE UP (ref 150–400)
POTASSIUM SERPL-MCNC: 4.6 MMOL/L — SIGNIFICANT CHANGE UP (ref 3.5–5.3)
POTASSIUM SERPL-SCNC: 4.6 MMOL/L — SIGNIFICANT CHANGE UP (ref 3.5–5.3)
PROT SERPL-MCNC: 6 G/DL — SIGNIFICANT CHANGE UP (ref 6–8.3)
PROTHROM AB SERPL-ACNC: 17.1 SEC — HIGH (ref 9.5–13)
RBC # BLD: 2.82 M/UL — LOW (ref 4.2–5.8)
RBC # FLD: 14.6 % — HIGH (ref 10.3–14.5)
RH IG SCN BLD-IMP: POSITIVE — SIGNIFICANT CHANGE UP
SODIUM SERPL-SCNC: 138 MMOL/L — SIGNIFICANT CHANGE UP (ref 135–145)
WBC # BLD: 6.92 K/UL — SIGNIFICANT CHANGE UP (ref 3.8–10.5)
WBC # FLD AUTO: 6.92 K/UL — SIGNIFICANT CHANGE UP (ref 3.8–10.5)

## 2023-08-14 PROCEDURE — 99232 SBSQ HOSP IP/OBS MODERATE 35: CPT

## 2023-08-14 PROCEDURE — 99231 SBSQ HOSP IP/OBS SF/LOW 25: CPT

## 2023-08-14 PROCEDURE — 93010 ELECTROCARDIOGRAM REPORT: CPT

## 2023-08-14 RX ADMIN — SPIRONOLACTONE 50 MILLIGRAM(S): 25 TABLET, FILM COATED ORAL at 05:19

## 2023-08-14 RX ADMIN — Medication 81 MILLIGRAM(S): at 11:16

## 2023-08-14 RX ADMIN — SPIRONOLACTONE 50 MILLIGRAM(S): 25 TABLET, FILM COATED ORAL at 21:03

## 2023-08-14 RX ADMIN — ATORVASTATIN CALCIUM 80 MILLIGRAM(S): 80 TABLET, FILM COATED ORAL at 21:04

## 2023-08-14 RX ADMIN — Medication 50 MILLIGRAM(S): at 05:19

## 2023-08-14 RX ADMIN — Medication 60 MILLIGRAM(S): at 11:17

## 2023-08-14 RX ADMIN — Medication 60 MILLIGRAM(S): at 05:18

## 2023-08-14 RX ADMIN — SPIRONOLACTONE 50 MILLIGRAM(S): 25 TABLET, FILM COATED ORAL at 11:17

## 2023-08-14 RX ADMIN — AMIODARONE HYDROCHLORIDE 400 MILLIGRAM(S): 400 TABLET ORAL at 13:05

## 2023-08-14 RX ADMIN — Medication 20 MILLIGRAM(S): at 05:19

## 2023-08-14 RX ADMIN — Medication 50 MILLIGRAM(S): at 13:05

## 2023-08-14 RX ADMIN — PANTOPRAZOLE SODIUM 40 MILLIGRAM(S): 20 TABLET, DELAYED RELEASE ORAL at 11:17

## 2023-08-14 RX ADMIN — CHLORHEXIDINE GLUCONATE 1 APPLICATION(S): 213 SOLUTION TOPICAL at 10:48

## 2023-08-14 RX ADMIN — Medication 60 MILLIGRAM(S): at 17:26

## 2023-08-14 RX ADMIN — Medication 50 MILLIGRAM(S): at 21:03

## 2023-08-14 RX ADMIN — APIXABAN 5 MILLIGRAM(S): 2.5 TABLET, FILM COATED ORAL at 05:19

## 2023-08-14 RX ADMIN — AMIODARONE HYDROCHLORIDE 400 MILLIGRAM(S): 400 TABLET ORAL at 21:03

## 2023-08-14 RX ADMIN — APIXABAN 5 MILLIGRAM(S): 2.5 TABLET, FILM COATED ORAL at 17:26

## 2023-08-14 RX ADMIN — AMIODARONE HYDROCHLORIDE 400 MILLIGRAM(S): 400 TABLET ORAL at 05:18

## 2023-08-14 RX ADMIN — Medication 60 MILLIGRAM(S): at 23:54

## 2023-08-14 NOTE — PROGRESS NOTE ADULT - PROBLEM SELECTOR PROBLEM 1
S/P CABG x 4

## 2023-08-14 NOTE — PROGRESS NOTE ADULT - ASSESSMENT
67 year old male PMH HTN, non-Hodgkin's lymphoma, SVT, BPH who presented after CT angio revealed 3vCAD, followed by cardiac cath which revealed significant severe 3vCAD who was sent to [facility] for planned CABG.   He is now POD 1, CABG x4 (LIMA-LAD, SVG-OM, SVG-Diag, SVG-PDA) now with post op afib.     #3vCAD s/p CABG:  - Continue ASA, high intensity statin  - cont on Torsemide, Aldactone to maintain negative balance  - cr mildly elevated. hold off on more diuresis.   - Please continue to maintain strict I/Os, monitor daily weights, Cr, and K.   - Incentive spirometry encouraged   - H+H stable this AM       #Post op afib, CHADSVASc 3 (age, CAD, HTN). Patient has signed consent for PACeS trial   - Af was now well controlled after adding dilt, has now converted to sr  - cont lopressor 50mg q8.    - tolerating po cardizem.   - cont on amio load   - Now on AC. cont eliquis per ctsx  - Monitor and replete electrolytes. Keep K>4.0 and Mg>2.0.     - Further cardiac workup will depend on clinical course.   - All other workup per primary team. Will followup.

## 2023-08-14 NOTE — PROGRESS NOTE ADULT - SUBJECTIVE AND OBJECTIVE BOX
[facility]    Adrian, Owen, Rodrigo, Layne Schumacher      [phone]    CHIEF COMPLAINT: Patient is a 67y old  Male who presents with a chief complaint of s/p C4l (13 Aug 2023 07:16)      Follow Up: cabg, aflutter    Interim history: The patient reports no new symptoms.  Denies chest discomfort and shortness of breath.  No abdominal pain.  No new neurologic symptoms.      MEDICATIONS  (STANDING):  aMIOdarone    Tablet   Oral   aMIOdarone    Tablet 400 milliGRAM(s) Oral every 8 hours  apixaban 5 milliGRAM(s) Oral every 12 hours  aspirin  chewable 81 milliGRAM(s) Oral daily  atorvastatin 80 milliGRAM(s) Oral at bedtime  chlorhexidine 2% Cloths 1 Application(s) Topical daily  diltiazem    Tablet 60 milliGRAM(s) Oral every 6 hours  melatonin 5 milliGRAM(s) Oral at bedtime  metoprolol tartrate 50 milliGRAM(s) Oral every 8 hours  pantoprazole  Injectable 40 milliGRAM(s) IV Push daily  polyethylene glycol 3350 17 Gram(s) Oral daily  psyllium Powder 1 Packet(s) Oral daily  spironolactone 50 milliGRAM(s) Oral <User Schedule>  torsemide 20 milliGRAM(s) Oral daily    MEDICATIONS  (PRN):  HYDROmorphone   Tablet 2 milliGRAM(s) Oral every 3 hours PRN Moderate Pain (4 - 6)  HYDROmorphone   Tablet 4 milliGRAM(s) Oral every 4 hours PRN Severe Pain (7 - 10)      REVIEW OF SYSTEMS:  eye, ent, GI, , allergic, dermatologic, musculoskeletal and neurologic are negative except as described above    Vital Signs Last 24 Hrs  T(C): 36.7 (14 Aug 2023 06:59), Max: 37.2 (13 Aug 2023 23:12)  T(F): 98.1 (14 Aug 2023 06:59), Max: 99 (13 Aug 2023 23:12)  HR: 73 (14 Aug 2023 06:59) (67 - 85)  BP: 108/66 (14 Aug 2023 06:59) (84/55 - 116/76)  BP(mean): 82 (14 Aug 2023 06:59) (64 - 92)  RR: 18 (14 Aug 2023 06:59) (18 - 18)  SpO2: 93% (14 Aug 2023 06:59) (91% - 95%)    Parameters below as of 14 Aug 2023 06:59  Patient On (Oxygen Delivery Method): room air        I&O's Summary    13 Aug 2023 07:01  -  14 Aug 2023 07:00  --------------------------------------------------------  IN: 600 mL / OUT: 1200 mL / NET: -600 mL        Telemetry past 24h: afl -> sr at 515 am    PHYSICAL EXAM:    Constitutional: well-nourished, well-developed, NAD   HEENT:  MMM, sclerae anicteric, conjunctivae clear, no oral cyanosis.  Pulmonary: Non-labored, breath sounds are clear bilaterally, No wheezing, rales or rhonchi  Cardiovascular: Regular, S1 and S2, sharon beats.  No murmur.  No rubs, gallops or clicks  Gastrointestinal: Bowel Sounds present, soft, nontender.   Lymph: mild lle peripheral edema.   Neurological: Alert, no focal deficits  Skin: No rashes.  Psych:  Mood & affect appropriate    LABS: All Labs Reviewed:                        8.9    6.92  )-----------( 231      ( 14 Aug 2023 07:26 )             27.2                         8.9    6.72  )-----------( 195      ( 13 Aug 2023 07:12 )             26.2                         9.6    8.15  )-----------( 212      ( 12 Aug 2023 07:12 )             28.7     14 Aug 2023 07:21    138    |  101    |  22     ----------------------------<  115    4.6     |  24     |  1.34   13 Aug 2023 07:07    136    |  100    |  23     ----------------------------<  130    3.8     |  22     |  1.25   12 Aug 2023 07:11    136    |  99     |  24     ----------------------------<  145    3.7     |  24     |  1.31     Ca    8.6        14 Aug 2023 07:21  Ca    8.4        13 Aug 2023 07:07  Ca    9.0        12 Aug 2023 07:11  Mg     2.0       12 Aug 2023 07:11    TPro  6.0    /  Alb  3.4    /  TBili  0.6    /  DBili  x      /  AST  14     /  ALT  24     /  AlkPhos  39     14 Aug 2023 07:21  TPro  5.7    /  Alb  3.3    /  TBili  0.6    /  DBili  x      /  AST  12     /  ALT  21     /  AlkPhos  35     13 Aug 2023 07:07  TPro  6.4    /  Alb  3.8    /  TBili  0.7    /  DBili  x      /  AST  14     /  ALT  18     /  AlkPhos  38     12 Aug 2023 07:11    PT/INR - ( 14 Aug 2023 07:25 )   PT: 17.1 sec;   INR: 1.65 ratio         PTT - ( 14 Aug 2023 07:25 )  PTT:32.3 sec      Blood Culture:         RADIOLOGY:    EKG:    Echo:

## 2023-08-14 NOTE — PROGRESS NOTE ADULT - ASSESSMENT
Pt. with a history of HTN s/p CT angio which revealed CAD, followed by cardiac cath which revealed significant CAD and CABG recommended. Pt. very active, walks 4 miles daily, denies chest pain, denies palpitations denies dizziness and denies dyspnea on exertion.  8/7 CABG x 4 (LIMA-LAD, SVG-OM, SVG-Diag,  Vasopresin, fluid resuscitation  8/8 Transferred to sdu  8/9 Rapid afib since last luis.  Amio load, increase lopressor.  Diuresis ,   Maintain ct   F/u lactate.  8/10  Pt in aflutter, hr 128,  multiple ivp lopressor given, hr unchanged. Started on cardizem infusion, hr 100-110.  maintain amio/lopressorEliquis started this am  8/11 VSS afib rate 70-90 Cardizem 8 mg /hr k 3.7 repleted 20 x1 ECHO completed  Normal LV / normal RV disposition to home  no skilled need  8/12 VSS K 3.7  K20x2 given.   Continue amiodarone load -Cardizem 60 mg po q6 Eliuis  2.5 bid to increase to 5 BID on Sunday -  NPO at midnight  cardioversion on Monday 8/13 VSS   K 3.8 kltye 25 x2  amiodarone load to 10 grams per  Dr Altamirano  NPO at midnight Type and screen  PTT/INR in am.  8/14 VSS - aflutter 50-60 - converted to SR 2am - 70s - DCCV cancelled for today.  eliquis 5 bid - amio 10gram load . will transfer to floor today - d/c plan home

## 2023-08-14 NOTE — PROGRESS NOTE ADULT - ASSESSMENT
67 year old male PMH HTN, non-Hodgkin's lymphoma, SVT, BPH who presented after CT angio revealed 3vCAD, followed by cardiac cath which revealed significant severe 3v CAD who was sent to [facility] for planned CABG. Patient is not POD #3 and converted to AF/AFL with RVR 8/9 PM. EP consulted for AF management.    1. New onset atrial fibrillation/atrial flutter  2. S/p CABG POD #3     - Keep K>4 and Mg>2  - TTE reviewed, EF of 60-65%, LA normal   - Patient converted to SR this morning  - Continue Amiodarone load  - Continue AC with Eliquis 5mg BID  - Rest of care per CT surgery  - EP will sign off. Reconsult PRN.    Amber Gomez PA-C  #[phone]

## 2023-08-14 NOTE — PROGRESS NOTE ADULT - SUBJECTIVE AND OBJECTIVE BOX
VITAL SIGNS-Telemetry:  damari 65-80 - converted to sr 70@ 5am  Vital Signs Last 24 Hrs  T(C): 36.7 (23 @ 06:59), Max: 37.2 (23 @ 23:12)  T(F): 98.1 (23 @ 06:59), Max: 99 (23 @ 23:12)  HR: 73 (23 @ 06:59) (67 - 85)  BP: 108/66 (23 @ 06:59) (84/55 - 116/76)  RR: 18 (23 @ 06:59) (18 - 18)  SpO2: 93% (23 @ 06:59) (91% - 95%)          @ 07:01  -   @ 07:00  --------------------------------------------------------  IN: 600 mL / OUT: 1200 mL / NET: -600 mL    Daily     Daily Weight in k.1 (14 Aug 2023 07:23)    Pacing Wires        [ x ]   Settings:   vvi 40                               Isolated  [  ]      PHYSICAL EXAM:  Neurology: alert and oriented x 3, nonfocal, no gross deficits  CV : S1S2  Sternal Wound :  CDI , Stable  Lungs: cta  Abdomen: soft, nontender, nondistended, positive bowel sounds, last bowel movement         Extremities:     no c/c/e    aMIOdarone    Tablet 400 milliGRAM(s) Oral every 8 hours  aMIOdarone    Tablet   Oral   apixaban 5 milliGRAM(s) Oral every 12 hours  aspirin  chewable 81 milliGRAM(s) Oral daily  atorvastatin 80 milliGRAM(s) Oral at bedtime  chlorhexidine 2% Cloths 1 Application(s) Topical daily  diltiazem    Tablet 60 milliGRAM(s) Oral every 6 hours  HYDROmorphone   Tablet 2 milliGRAM(s) Oral every 3 hours PRN  HYDROmorphone   Tablet 4 milliGRAM(s) Oral every 4 hours PRN  melatonin 5 milliGRAM(s) Oral at bedtime  metoprolol tartrate 50 milliGRAM(s) Oral every 8 hours  pantoprazole  Injectable 40 milliGRAM(s) IV Push daily  polyethylene glycol 3350 17 Gram(s) Oral daily  psyllium Powder 1 Packet(s) Oral daily  spironolactone 50 milliGRAM(s) Oral <User Schedule>  torsemide 20 milliGRAM(s) Oral daily    Physical Therapy Rec:   Home  [ x ]   Home w/ PT  [  ]  Rehab  [  ]  Discussed with Cardiothoracic Team at AM rounds. VITAL SIGNS-Telemetry:  damari 65-80 - converted to sr 70@ 5am  Vital Signs Last 24 Hrs  T(C): 36.7 (23 @ 06:59), Max: 37.2 (23 @ 23:12)  T(F): 98.1 (23 @ 06:59), Max: 99 (23 @ 23:12)  HR: 73 (23 @ 06:59) (67 - 85)  BP: 108/66 (23 @ 06:59) (84/55 - 116/76)  RR: 18 (23 @ 06:59) (18 - 18)  SpO2: 93% (23 @ 06:59) (91% - 95%)          @ 07:01  -   @ 07:00  --------------------------------------------------------  IN: 600 mL / OUT: 1200 mL / NET: -600 mL    Daily     Daily Weight in k.1 (14 Aug 2023 07:23)    Pacing Wires        [ x ]   Settings:   vvi 40                               Isolated  [  ]      PHYSICAL EXAM:  Neurology: alert and oriented x 3, nonfocal, no gross deficits  CV : S1S2  Sternal Wound :  CDI , Stable  Lungs: cta  Abdomen: soft, nontender, nondistended, positive bowel sounds, last bowel movement   +bm      Extremities:  +edema b/l ankles L > R - lle inc cdi w/ suture@ distal incision  aMIOdarone    Tablet 400 milliGRAM(s) Oral every 8 hours  aMIOdarone    Tablet   Oral   apixaban 5 milliGRAM(s) Oral every 12 hours  aspirin  chewable 81 milliGRAM(s) Oral daily  atorvastatin 80 milliGRAM(s) Oral at bedtime  chlorhexidine 2% Cloths 1 Application(s) Topical daily  diltiazem    Tablet 60 milliGRAM(s) Oral every 6 hours  HYDROmorphone   Tablet 2 milliGRAM(s) Oral every 3 hours PRN  HYDROmorphone   Tablet 4 milliGRAM(s) Oral every 4 hours PRN  melatonin 5 milliGRAM(s) Oral at bedtime  metoprolol tartrate 50 milliGRAM(s) Oral every 8 hours  pantoprazole  Injectable 40 milliGRAM(s) IV Push daily  polyethylene glycol 3350 17 Gram(s) Oral daily  psyllium Powder 1 Packet(s) Oral daily  spironolactone 50 milliGRAM(s) Oral <User Schedule>  torsemide 20 milliGRAM(s) Oral daily    Physical Therapy Rec:   Home  [ x ]   Home w/ PT  [  ]  Rehab  [  ]  Discussed with Cardiothoracic Team at AM rounds.

## 2023-08-14 NOTE — DISCHARGE NOTE NURSING/CASE MANAGEMENT/SOCIAL WORK - NSDCPEELIQUIS_GEN_ALL_CORE
Apixaban/Eliquis - Compliance/Apixaban/Eliquis - Dietary Advice/Apixaban/Eliquis - Follow up monitoring/Apixaban/Eliquis - Potential for adverse drug reactions and interactions none

## 2023-08-14 NOTE — PROGRESS NOTE ADULT - SUBJECTIVE AND OBJECTIVE BOX
24H hour events: Converted to SR early this morning    MEDICATIONS:  aMIOdarone    Tablet   Oral   aMIOdarone    Tablet 400 milliGRAM(s) Oral every 8 hours  apixaban 5 milliGRAM(s) Oral every 12 hours  aspirin  chewable 81 milliGRAM(s) Oral daily  diltiazem    Tablet 60 milliGRAM(s) Oral every 6 hours  metoprolol tartrate 50 milliGRAM(s) Oral every 8 hours  spironolactone 50 milliGRAM(s) Oral <User Schedule>  torsemide 20 milliGRAM(s) Oral daily  HYDROmorphone   Tablet 4 milliGRAM(s) Oral every 4 hours PRN  HYDROmorphone   Tablet 2 milliGRAM(s) Oral every 3 hours PRN  melatonin 5 milliGRAM(s) Oral at bedtime  pantoprazole  Injectable 40 milliGRAM(s) IV Push daily  polyethylene glycol 3350 17 Gram(s) Oral daily  psyllium Powder 1 Packet(s) Oral daily  atorvastatin 80 milliGRAM(s) Oral at bedtime  chlorhexidine 2% Cloths 1 Application(s) Topical daily      REVIEW OF SYSTEMS:  See HPI, otherwise ROS negative.    PHYSICAL EXAM:  T(C): 36.8 (08-14-23 @ 11:00), Max: 37.2 (08-13-23 @ 23:12)  HR: 73 (08-14-23 @ 11:00) (69 - 85)  BP: 96/57 (08-14-23 @ 11:00) (84/55 - 116/76)  RR: 18 (08-14-23 @ 11:00) (18 - 18)  SpO2: 93% (08-14-23 @ 06:59) (91% - 95%)  Wt(kg): --  I&O's Summary    13 Aug 2023 07:01  -  14 Aug 2023 07:00  --------------------------------------------------------  IN: 600 mL / OUT: 1200 mL / NET: -600 mL    14 Aug 2023 07:01  -  14 Aug 2023 12:04  --------------------------------------------------------  IN: 120 mL / OUT: 850 mL / NET: -730 mL        Appearance: Alert. NAD	  Cardiovascular: +S1S2 RRR no m/g/r  Respiratory: CTA B/L	  Psychiatry: A & O x 3, Mood & affect appropriate  Skin: No rashes	  Neurologic: Non-focal  Extremities: No edema BLE  Vascular: Peripheral pulses palpable 2+ bilaterally      LABS:	 	    CBC Full  -  ( 14 Aug 2023 07:26 )  WBC Count : 6.92 K/uL  Hemoglobin : 8.9 g/dL  Hematocrit : 27.2 %  Platelet Count - Automated : 231 K/uL  Mean Cell Volume : 96.5 fl  Mean Cell Hemoglobin : 31.6 pg  Mean Cell Hemoglobin Concentration : 32.7 gm/dL  Auto Neutrophil # : x  Auto Lymphocyte # : x  Auto Monocyte # : x  Auto Eosinophil # : x  Auto Basophil # : x  Auto Neutrophil % : x  Auto Lymphocyte % : x  Auto Monocyte % : x  Auto Eosinophil % : x  Auto Basophil % : x    08-14    138  |  101  |  22  ----------------------------<  115<H>  4.6   |  24  |  1.34<H>  08-13    136  |  100  |  23  ----------------------------<  130<H>  3.8   |  22  |  1.25    Ca    8.6      14 Aug 2023 07:21  Ca    8.4      13 Aug 2023 07:07    TPro  6.0  /  Alb  3.4  /  TBili  0.6  /  DBili  x   /  AST  14  /  ALT  24  /  AlkPhos  39<L>  08-14  TPro  5.7<L>  /  Alb  3.3  /  TBili  0.6  /  DBili  x   /  AST  12  /  ALT  21  /  AlkPhos  35<L>  08-13    TELEMETRY: SR 70s

## 2023-08-14 NOTE — DISCHARGE NOTE NURSING/CASE MANAGEMENT/SOCIAL WORK - PATIENT PORTAL LINK FT
You can access the FollowMyHealth Patient Portal offered by [facility] by registering at the following website: [email or web address]. By joining [employer]’s FollowMyHealth portal, you will also be able to view your health information using other applications (apps) compatible with our system.

## 2023-08-14 NOTE — PROGRESS NOTE ADULT - PROBLEM SELECTOR PLAN 2
8/8 Afib   8/14 converted to SR @ 5am 70s - cardioversion cancelled today - will continue with amiodarone load   - 10 gram  Cardizem  60 tid  metoprolol 50 TID   Anticoagulation with Eliquis 2.5 increased to 5 bid  8/13  Cardioversion for Monday 8/14   NPO Sunday night  PTT/INR Type and screen  Dr Love Ep  consulted

## 2023-08-14 NOTE — PROGRESS NOTE ADULT - PROBLEM SELECTOR PLAN 1
8/7  CABG 4  8/8 Afib  amiodarone load - 10 g load   Cardizem 60 qid  PACES STUDY -    ASA 81  Metorpolol 50 TID  Atorvasatin 80   Eliquis 2.5 BID    +  PW  + Bm     Chest PT,  Incentive spirometry, wound care and assessment.  Ambulate   Dr Fulton cardiology   disposition to home
Asa, Statin, B-blocker, Chest PT,  Incentive spirometry, wound care and assessment.  Ambulate   D/c chest tubes when drainage decreases.
8/7  CABG 4  8/8 Afib  amiodarone load   Cardizem GTT 8mg.hr  PACES STUDY -    ASA 81  Metorpolol 50 TID  Atorvasatin 80   Eliquis 2.5 BID   + PEPM VVI 40   + Bm     Chest PT,  Incentive spirometry, wound care and assessment.  Ambulate   Dr Fulton cardiology   disposition to home
8/7  CABG 4  8/8 Afib  amiodarone load - 10 g load   Cardizem 60 qid  PACES STUDY -    ASA 81  Metorpolol 50 TID  Atorvasatin 80    8/13 Eliquis 5 BID    +  PW  + Bm     Chest PT,  Incentive spirometry, wound care and assessment.  Ambulate   Dr Fulton cardiology   disposition to home
8/7  CABG 4  8/8 Afib  amiodarone load - 10 g load   Cardizem 60 qid  PACES STUDY -    ASA 81  Metorpolol 50 TID  Atorvasatin 80    8/13 Eliquis 5 BID    +  PW  + Bm     Chest PT,  Incentive spirometry, wound care and assessment.  Ambulate   Dr Fulton cardiology   disposition to home

## 2023-08-15 ENCOUNTER — TRANSCRIPTION ENCOUNTER (OUTPATIENT)
Age: 68
End: 2023-08-15

## 2023-08-15 VITALS
TEMPERATURE: 97 F | DIASTOLIC BLOOD PRESSURE: 64 MMHG | HEART RATE: 61 BPM | SYSTOLIC BLOOD PRESSURE: 100 MMHG | OXYGEN SATURATION: 98 % | RESPIRATION RATE: 18 BRPM

## 2023-08-15 LAB
ANION GAP SERPL CALC-SCNC: 14 MMOL/L — SIGNIFICANT CHANGE UP (ref 5–17)
BUN SERPL-MCNC: 24 MG/DL — HIGH (ref 7–23)
CALCIUM SERPL-MCNC: 8.4 MG/DL — SIGNIFICANT CHANGE UP (ref 8.4–10.5)
CHLORIDE SERPL-SCNC: 102 MMOL/L — SIGNIFICANT CHANGE UP (ref 96–108)
CO2 SERPL-SCNC: 19 MMOL/L — LOW (ref 22–31)
CREAT SERPL-MCNC: 1.29 MG/DL — SIGNIFICANT CHANGE UP (ref 0.5–1.3)
EGFR: 60 ML/MIN/1.73M2 — SIGNIFICANT CHANGE UP
GLUCOSE SERPL-MCNC: 105 MG/DL — HIGH (ref 70–99)
HCT VFR BLD CALC: 26.6 % — LOW (ref 39–50)
HGB BLD-MCNC: 8.8 G/DL — LOW (ref 13–17)
MCHC RBC-ENTMCNC: 31 PG — SIGNIFICANT CHANGE UP (ref 27–34)
MCHC RBC-ENTMCNC: 33.1 GM/DL — SIGNIFICANT CHANGE UP (ref 32–36)
MCV RBC AUTO: 93.7 FL — SIGNIFICANT CHANGE UP (ref 80–100)
NRBC # BLD: 0 /100 WBCS — SIGNIFICANT CHANGE UP (ref 0–0)
PLATELET # BLD AUTO: 249 K/UL — SIGNIFICANT CHANGE UP (ref 150–400)
POTASSIUM SERPL-MCNC: 4.5 MMOL/L — SIGNIFICANT CHANGE UP (ref 3.5–5.3)
POTASSIUM SERPL-SCNC: 4.5 MMOL/L — SIGNIFICANT CHANGE UP (ref 3.5–5.3)
RBC # BLD: 2.84 M/UL — LOW (ref 4.2–5.8)
RBC # FLD: 14.6 % — HIGH (ref 10.3–14.5)
SODIUM SERPL-SCNC: 135 MMOL/L — SIGNIFICANT CHANGE UP (ref 135–145)
WBC # BLD: 7.31 K/UL — SIGNIFICANT CHANGE UP (ref 3.8–10.5)
WBC # FLD AUTO: 7.31 K/UL — SIGNIFICANT CHANGE UP (ref 3.8–10.5)

## 2023-08-15 PROCEDURE — 93010 ELECTROCARDIOGRAM REPORT: CPT

## 2023-08-15 PROCEDURE — 99233 SBSQ HOSP IP/OBS HIGH 50: CPT

## 2023-08-15 RX ORDER — AMIODARONE HYDROCHLORIDE 400 MG/1
1 TABLET ORAL
Qty: 30 | Refills: 0
Start: 2023-08-15 | End: 2023-09-13

## 2023-08-15 RX ORDER — METOPROLOL TARTRATE 50 MG
100 TABLET ORAL
Refills: 0 | Status: DISCONTINUED | OUTPATIENT
Start: 2023-08-15 | End: 2023-08-15

## 2023-08-15 RX ORDER — METOPROLOL TARTRATE 50 MG
1 TABLET ORAL
Qty: 60 | Refills: 1
Start: 2023-08-15 | End: 2023-10-13

## 2023-08-15 RX ORDER — DILTIAZEM HCL 120 MG
1 CAPSULE, EXT RELEASE 24 HR ORAL
Qty: 30 | Refills: 1
Start: 2023-08-15 | End: 2023-10-13

## 2023-08-15 RX ORDER — METOPROLOL TARTRATE 50 MG
1 TABLET ORAL
Refills: 0 | DISCHARGE

## 2023-08-15 RX ORDER — AMIODARONE HYDROCHLORIDE 400 MG/1
200 TABLET ORAL DAILY
Refills: 0 | Status: DISCONTINUED | OUTPATIENT
Start: 2023-08-15 | End: 2023-08-15

## 2023-08-15 RX ORDER — SPIRONOLACTONE 25 MG/1
1 TABLET, FILM COATED ORAL
Qty: 7 | Refills: 0
Start: 2023-08-15 | End: 2023-08-21

## 2023-08-15 RX ORDER — SPIRONOLACTONE 25 MG/1
25 TABLET, FILM COATED ORAL DAILY
Refills: 0 | Status: DISCONTINUED | OUTPATIENT
Start: 2023-08-16 | End: 2023-08-15

## 2023-08-15 RX ORDER — VALSARTAN 80 MG/1
1 TABLET ORAL
Refills: 0 | DISCHARGE

## 2023-08-15 RX ORDER — DILTIAZEM HCL 120 MG
240 CAPSULE, EXT RELEASE 24 HR ORAL AT BEDTIME
Refills: 0 | Status: DISCONTINUED | OUTPATIENT
Start: 2023-08-15 | End: 2023-08-15

## 2023-08-15 RX ORDER — APIXABAN 2.5 MG/1
1 TABLET, FILM COATED ORAL
Qty: 60 | Refills: 2
Start: 2023-08-15 | End: 2023-11-12

## 2023-08-15 RX ORDER — ATORVASTATIN CALCIUM 80 MG/1
2 TABLET, FILM COATED ORAL
Qty: 60 | Refills: 3
Start: 2023-08-15 | End: 2023-12-12

## 2023-08-15 RX ADMIN — Medication 50 MILLIGRAM(S): at 05:47

## 2023-08-15 RX ADMIN — AMIODARONE HYDROCHLORIDE 400 MILLIGRAM(S): 400 TABLET ORAL at 05:47

## 2023-08-15 RX ADMIN — Medication 81 MILLIGRAM(S): at 11:03

## 2023-08-15 RX ADMIN — CHLORHEXIDINE GLUCONATE 1 APPLICATION(S): 213 SOLUTION TOPICAL at 11:04

## 2023-08-15 RX ADMIN — Medication 60 MILLIGRAM(S): at 05:47

## 2023-08-15 RX ADMIN — Medication 20 MILLIGRAM(S): at 05:47

## 2023-08-15 RX ADMIN — SPIRONOLACTONE 50 MILLIGRAM(S): 25 TABLET, FILM COATED ORAL at 05:47

## 2023-08-15 NOTE — DISCHARGE NOTE PROVIDER - NSDCFUADDAPPT_GEN_ALL_CORE_FT
Please follow up with Dr. Altamirano in Aug 23 at 3pm> Call Amanda Spence  any questions regarding appt > [phone]  Cardiac surgery office at [facility] entrance 3  first floor on left after entering lobby  Office telephone     Your Care Navigator Nurse Practitioner will be in touch to see you in your home within a few days from discharge. The Follow Your Heart program can help ensure you understand your medications, discharge instructions and answer any questions you may have at that time. They are also a great source to address concerns during the day and may be reached at [phone].     None

## 2023-08-15 NOTE — DISCHARGE NOTE PROVIDER - HOSPITAL COURSE
67 year old male history of HTN s/p CT angio which revealed CAD, followed by cardiac cath which revealed significant CAD and CABG recommended. Pt. very active, walks 4 miles daily, denies chest pain, denies palpitations denies dizziness and denies dyspnea on exertion.  8/7 CABG x 4 (LIMA-LAD, SVG-OM, SVG-Diag,  Vasopresin, fluid resuscitation  8/8 Transferred to sdu  8/9 Rapid afib since last luis.  Amio load, increase lopressor.  Diuresis ,   Maintain ct   F/u lactate.  8/10  Pt in aflutter, hr 128,  multiple ivp lopressor given, hr unchanged. Started on cardizem infusion, hr 100-110.  maintain amio/lopressorEliquis started this am  8/11 VSS afib rate 70-90 Cardizem 8 mg /hr k 3.7 repleted 20 x1 ECHO completed  Normal LV / normal RV disposition to home  no skilled need  8/12 VSS K 3.7  K20x2 given.   Continue amiodarone load -Cardizem 60 mg po q6 Eliuis  2.5 bid to increase to 5 BID on Sunday -  NPO at midnight  cardioversion on Monday 8/13 VSS   K 3.8 kltye 25 x2  amiodarone load to 10 grams per  Dr Altamirano  NPO at midnight Type and screen  PTT/INR in am.  8/14 VSS - aflutter 50-60 - converted to SR 2am - 70s - DCCV cancelled for today.  eliquis 5 bid - amio 10gram load . will transfer to floor today - d/c plan home   8/15 DC home Resume Eliquis am

## 2023-08-15 NOTE — DISCHARGE NOTE PROVIDER - NSDCFUADDINST_GEN_ALL_CORE_FT
Wash incisions with soap and water using washcloth in shower daily,do not apply any lotion,powder,oil,sunscreen to any surgical incision  Please come to ED or Call Cardio thoracic office at [phone] if Chest pain, Shortness of Breath, persistant Nausea & vomiting, oozing from wounds,palpitations or pain not relieved with medication  Weigh yourself daily>notify surgeons office if  2 lb increase in weight in 24 hours.  1. Take ALL of your medications as ordered. Fill your prescriptions the day you are discharged and take according to the schedule you were given. Continue to take a stool softener if you are taking narcotic pain medications. AVOID medications such as ibuprofen or naproxen if you have had bypass surgery. If you have any questions or are unable to fill the prescriptions, please call the office right away at [phone].  2. Shower daily. Clean all incisions daily while showering with warm water and mild soap, pat dry with a clean towel and do not cover with any dressings unless instructed to. No bathing, swimming in a pool or the ocean until instructed by MD.  DO NOT use creams or lotions on the wound.  3. We advise that you do not drive until instructed by MD. Use your red pillow between chest and seatbelt to avoid injury in the event of a motor vehicle accident until you see the surgeon again in the office.  4. You may not return to work until instructed by MD.   5. Please eat a low fat, low cholesterol, low salt diet. (No added/extra salt). A nutritional supplement like Ensure or Glucerna (for diabetics) may help you reach your nutritional goals after surgery and aid in your recovery.  6. Weigh yourself every day in the morning and record it in the weight log in your red folder. Notify the office of any weight gain more than 2-3 pounds in 24 hours.  **Please LIMIT YOUR FLUID INTAKE TO ABOUT 4 8 OUNCE GLASSES OF BEVERAGE DAILY.**  7. Continue breathing exercises several times a day. Continue to use your heart pillow when coughing.  8. No heavy lifting nothing greater than 5 pounds until cleared by MD. We recommend that you sleep on your back and not your side or stomach for the next 4-6 weeks.  9. Call / Notify MD any fever greater than 101.0, any drainage from incisions or if they become red, hot or very tender to the touch.  10. Increase activity as tolerated. Walk indoors and/or outdoors at least 3 times a day.

## 2023-08-15 NOTE — DISCHARGE NOTE PROVIDER - NSDCFUSCHEDAPPT_GEN_ALL_CORE_FT
Fausto Altamirano  [facility]  CTSURG [address]  Scheduled Appointment: 08/23/2023    Farooq Schumacher  [facility]  CARDIOLOGY [address]  Scheduled Appointment: 09/19/2023

## 2023-08-15 NOTE — DISCHARGE NOTE PROVIDER - CARE PROVIDER_API CALL
Fausto Altamirano  Thoracic and Cardiac Surgery  [address] [place] [id number]  Phone: [phone]  Fax: [phone]  Scheduled Appointment: 08/23/2023 03:00 PM

## 2023-08-15 NOTE — PROGRESS NOTE ADULT - SUBJECTIVE AND OBJECTIVE BOX
[facility] - Owen Campbell, Rodrigo, Endy, Mick Tillman  Office Number:  [phone]    Follow up: CABG, afib/flutter    Patient resting comfortably in bed in NAD.  Laying flat with no respiratory distress.  No complaints of chest pain, dyspnea, palpitations, PND, or orthopnea.    ROS: negative unless otherwise mentioned.    Telemetry:  NSR 60's-70's (converted yesterday AM). No events.     MEDICATIONS  (STANDING):  aMIOdarone    Tablet 400 milliGRAM(s) Oral every 8 hours  aMIOdarone    Tablet   Oral   aspirin  chewable 81 milliGRAM(s) Oral daily  atorvastatin 80 milliGRAM(s) Oral at bedtime  chlorhexidine 2% Cloths 1 Application(s) Topical daily  diltiazem    Tablet 60 milliGRAM(s) Oral every 6 hours  melatonin 5 milliGRAM(s) Oral at bedtime  metoprolol tartrate 50 milliGRAM(s) Oral every 8 hours  pantoprazole  Injectable 40 milliGRAM(s) IV Push daily  polyethylene glycol 3350 17 Gram(s) Oral daily  psyllium Powder 1 Packet(s) Oral daily  spironolactone 50 milliGRAM(s) Oral <User Schedule>  torsemide 20 milliGRAM(s) Oral daily    MEDICATIONS  (PRN):  HYDROmorphone   Tablet 2 milliGRAM(s) Oral every 3 hours PRN Moderate Pain (4 - 6)  HYDROmorphone   Tablet 4 milliGRAM(s) Oral every 4 hours PRN Severe Pain (7 - 10)      Allergies    PC Pen VK (Rash)  adhesives (Rash)    Intolerances        Vital Signs Last 24 Hrs  T(C): 36.7 (15 Aug 2023 04:16), Max: 36.9 (14 Aug 2023 12:34)  T(F): 98.1 (15 Aug 2023 04:16), Max: 98.4 (14 Aug 2023 12:34)  HR: 67 (15 Aug 2023 04:16) (67 - 73)  BP: 113/69 (15 Aug 2023 04:16) (96/57 - 113/69)  BP(mean): 71 (14 Aug 2023 11:00) (71 - 71)  RR: 18 (15 Aug 2023 04:16) (16 - 18)  SpO2: 93% (15 Aug 2023 04:16) (93% - 97%)    Parameters below as of 15 Aug 2023 04:16  Patient On (Oxygen Delivery Method): room air        I&O's Summary    14 Aug 2023 07:01  -  15 Aug 2023 07:00  --------------------------------------------------------  IN: 460 mL / OUT: 2400 mL / NET: -1940 mL        ON EXAM:    Constitutional: well-nourished, well-developed, NAD   HEENT:  MMM, sclerae anicteric, conjunctivae clear, no oral cyanosis.  Pulmonary: Non-labored, breath sounds are clear bilaterally, No wheezing, rales or rhonchi  Cardiovascular: Regular, S1 and S2.  No murmur.  No rubs, gallops or clicks  Gastrointestinal: Bowel Sounds present, soft, nontender.   Lymph: mild lle peripheral edema.   Neurological: Alert, no focal deficits  Skin: No rashes.  Psych:  Mood & affect appropriate    LABS: All Labs Reviewed:                        8.8    7.31  )-----------( 249      ( 15 Aug 2023 06:19 )             26.6                         8.9    6.92  )-----------( 231      ( 14 Aug 2023 07:26 )             27.2                         8.9    6.72  )-----------( 195      ( 13 Aug 2023 07:12 )             26.2     15 Aug 2023 06:18    135    |  102    |  24     ----------------------------<  105    4.5     |  19     |  1.29   14 Aug 2023 07:21    138    |  101    |  22     ----------------------------<  115    4.6     |  24     |  1.34   13 Aug 2023 07:07    136    |  100    |  23     ----------------------------<  130    3.8     |  22     |  1.25     Ca    8.4        15 Aug 2023 06:18  Ca    8.6        14 Aug 2023 07:21  Ca    8.4        13 Aug 2023 07:07    TPro  6.0    /  Alb  3.4    /  TBili  0.6    /  DBili  x      /  AST  14     /  ALT  24     /  AlkPhos  39     14 Aug 2023 07:21  TPro  5.7    /  Alb  3.3    /  TBili  0.6    /  DBili  x      /  AST  12     /  ALT  21     /  AlkPhos  35     13 Aug 2023 07:07    PT/INR - ( 14 Aug 2023 07:25 )   PT: 17.1 sec;   INR: 1.65 ratio         PTT - ( 14 Aug 2023 07:25 )  PTT:32.3 sec      Blood Culture:     KG: NSR, LAD, NSST      TTE 12/22/2022  EF 55-60%   Increased relative wall thickening c/w LV concentric remodeling  Mildly Dilated RA, severely dilated LA  Mild calcification of mitral valve annulus      CCTA:  IMPRESSION:  1. Proximal LAD has severe stenosis with high risk features; mid LAD has probable severe stenosis. D2 has probable moderate-severe stenosis. Mid-distal Lcx has severe stenosis with high risk features. R-PDA has severe stenosis. R-PLB has indeterminate stenosis.  Rest of the coronary findings as detailed above.    2. Agatston calcium score of 2439, which is at 96th percentile for individuals of the same age, gender, and race/ethnicity who are free of clinical cardiovascular disease and treated diabetes by the MCKENZIE calculator.    3. Borderline dilated aortic root and dilated ascending aorta (measurements detailed above).    4. Asymmetric thickening of the basal anteroseptum  15mm, consider additional cardiac imaging (echocardiogram or cardiac MRI) for further evaluation as clinically indicated.    5. Probable patent foramen ovale, consider echocardiogram with bubble study if indicated.    6. Qualitatively dilated left atrium, consider correlation with echocardiogram as clinically indicated.    [facility] 7/18/23:  Left main artery: The segment is visually normal in size and  structure.    LAD   Proximal left anterior descending: There is an 85 % stenosis. Mid left  anterior descending: There is a 70 % stenosis. First  diagonal: Angiography shows mild atherosclerosis. Second diagonal:  Angiography shows minor irregularities.    CX   First obtuse marginal: There is an 85 % stenosis.      RCA   Mid right coronary artery: There is a 40 % stenosis. Distal right  coronary artery: There is a 40 % stenosis. Right posterior  descending artery: There is an 80 % stenosis. First right  posterolateral: There is a 100 % stenosis.

## 2023-08-15 NOTE — DISCHARGE NOTE PROVIDER - NSDCPNSUBOBJ_GEN_ALL_CORE
Subjective:  "Hello"  OOB chair  Ambulated independently      Tele:  SR  60-80           V/S:                    T(F): 97.2 (08-15-23 @ 11:35), Max: 98.4 (23 @ 12:34)  HR: 61 (08-15-23 @ 11:35) (61 - 73)  BP: 100/64 (08-15-23 @ 11:35) (100/64 - 113/69)  RR: 18 (08-15-23 @ 11:35) (16 - 18)  SpO2: 98% (08-15-23 @ 11:35) (93% - 98%)  Wt(kg): --      LV EF:      Labs:  08-15    135  |  102  |  24<H>  ----------------------------<  105<H>  4.5   |  19<L>  |  1.29    Ca    8.4      15 Aug 2023 06:18    TPro  6.0  /  Alb  3.4  /  TBili  0.6  /  DBili  x   /  AST  14  /  ALT  24  /  AlkPhos  39<L>                                 8.8    7.31  )-----------( 249      ( 15 Aug 2023 06:19 )             26.6        PT/INR - ( 14 Aug 2023 07:25 )   PT: 17.1 sec;   INR: 1.65 ratio         PTT - ( 14 Aug 2023 07:25 )  PTT:32.3 sec     CAPILLARY BLOOD GLUCOSE               CXR:    I&O's Detail    14 Aug 2023 07:01  -  15 Aug 2023 07:00  --------------------------------------------------------  IN:    Oral Fluid: 460 mL  Total IN: 460 mL    OUT:    Voided (mL): 2400 mL  Total OUT: 2400 mL    Total NET: -1940 mL      15 Aug 2023 07:01  -  15 Aug 2023 12:12  --------------------------------------------------------  IN:    Oral Fluid: 240 mL  Total IN: 240 mL    OUT:    Voided (mL): 1300 mL  Total OUT: 1300 mL    Total NET: -1060 mL    EPICARDIAL WIRES:  [x ] YES [ ] NO      BOWEL MOVEMENT:  [x ] YES [ ] NO      Daily     Daily Weight in k.8 (15 Aug 2023 08:47)  Medications:  aMIOdarone    Tablet 200 milliGRAM(s) Oral daily  aspirin  chewable 81 milliGRAM(s) Oral daily  atorvastatin 80 milliGRAM(s) Oral at bedtime  chlorhexidine 2% Cloths 1 Application(s) Topical daily  diltiazem    milliGRAM(s) Oral at bedtime  HYDROmorphone   Tablet 2 milliGRAM(s) Oral every 3 hours PRN  melatonin 5 milliGRAM(s) Oral at bedtime  metoprolol succinate  milliGRAM(s) Oral two times a day  polyethylene glycol 3350 17 Gram(s) Oral daily  psyllium Powder 1 Packet(s) Oral daily        Physical Exam:  Neurology: alert and oriented x 3, nonfocal, no gross deficits  CV : S1S2  Sternal Wound :  CDI , Stable  Pacing wires removed  Lungs: cta  Abdomen: soft, nontender, nondistended, positive bowel sounds, last bowel movement   +bm      Extremities:  +edema b/l ankles L > R - lle inc cdi w/ suture@ distal incision> suture removed                     PAST MEDICAL & SURGICAL HISTORY:  Hypertension      Ulcerative colitis      Supraventricular Tachycardia      Non-Hodgkin lymphoma  Treated with Chemotherapy and Radiation Therapy      Hyperlipidemia      History of BPH      Finger Injury  foreign body removed from right hand middle      Malignant neoplasm of lymph node  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

## 2023-08-15 NOTE — PROGRESS NOTE ADULT - PROVIDER SPECIALTY LIST ADULT
CT Surgery
CT Surgery
Cardiology
Critical Care
Critical Care
Electrophysiology
Electrophysiology
CT Surgery
CT Surgery
Cardiology
CT Surgery

## 2023-08-15 NOTE — PROGRESS NOTE ADULT - ASSESSMENT
67 year old male PMH HTN, non-Hodgkin's lymphoma, SVT, BPH who presented after CT angio revealed 3vCAD, followed by cardiac cath which revealed significant severe 3vCAD who was sent to [facility] for planned CABG.   He is now POD 1, CABG x4 (LIMA-LAD, SVG-OM, SVG-Diag, SVG-PDA) c/b post op afib/flutter.     #3vCAD s/p CABG:  - Continue ASA, high intensity statin. Would obtain lipid panel to ensure LDL-c is at goal   - Cont on Torsemide maintain negative balance. Can likely hold Aldactone   - Please continue to maintain strict I/Os, monitor daily weights, Cr, and K.   - Incentive spirometry encouraged   - H+H stable this AM       #Post op afib, CHADSVASc 3 (age, CAD, HTN)  - Has now converted to NSR on 8/14 AM.   - Cont lopressor 50mg q8. Would switch to XL BID dosing prior to discharge     - Tolerating po cardizem.   - Cont on amio load as per CTS  - Continue AC. Currently on hold for epicardial lead removal today. Resume when safe to do so.   - Monitor and replete electrolytes. Keep K>4.0 and Mg>2.0.     - Further cardiac workup will depend on clinical course.     Will continue to follow closely.

## 2023-08-15 NOTE — DISCHARGE NOTE PROVIDER - NSDCMRMEDTOKEN_GEN_ALL_CORE_FT
amiodarone 200 mg oral tablet: 1 tab(s) orally once a day  aspirin 81 mg oral delayed release tablet: 1 tab(s) orally once a day  atorvastatin 40 mg oral tablet: 2 tab(s) orally once a day (at bedtime)  dilTIAZem 240 mg/24 hours oral capsule, extended release: 1 cap(s) orally once a day (at bedtime)  Eliquis 2.5 mg oral tablet: 1 tab(s) orally 2 times a day  metoprolol succinate 100 mg oral tablet, extended release: 1 tab(s) orally 2 times a day  spironolactone 25 mg oral tablet: 1 tab(s) orally once a day  tamsulosin 0.4 mg oral capsule: 1 orally once a day  torsemide 10 mg oral tablet: 1 tab(s) orally once a day

## 2023-08-16 ENCOUNTER — NON-APPOINTMENT (OUTPATIENT)
Age: 68
End: 2023-08-16

## 2023-08-16 RX ORDER — METOPROLOL SUCCINATE 50 MG/1
50 TABLET, EXTENDED RELEASE ORAL
Qty: 180 | Refills: 1 | Status: DISCONTINUED | COMMUNITY
Start: 2023-02-17 | End: 2023-08-16

## 2023-08-16 RX ORDER — VALSARTAN 40 MG/1
40 TABLET, COATED ORAL DAILY
Qty: 90 | Refills: 2 | Status: DISCONTINUED | COMMUNITY
Start: 2023-06-02 | End: 2023-08-16

## 2023-08-17 ENCOUNTER — TRANSCRIPTION ENCOUNTER (OUTPATIENT)
Age: 68
End: 2023-08-17

## 2023-08-17 ENCOUNTER — APPOINTMENT (OUTPATIENT)
Dept: CARE COORDINATION | Facility: HOME HEALTH | Age: 68
End: 2023-08-17
Payer: MEDICARE

## 2023-08-17 VITALS
SYSTOLIC BLOOD PRESSURE: 112 MMHG | HEART RATE: 66 BPM | DIASTOLIC BLOOD PRESSURE: 62 MMHG | RESPIRATION RATE: 14 BRPM | OXYGEN SATURATION: 95 %

## 2023-08-17 PROCEDURE — 99024 POSTOP FOLLOW-UP VISIT: CPT

## 2023-08-17 NOTE — HISTORY OF PRESENT ILLNESS
[FreeTextEntry1] : 67 year old male history of HTN s/p CT angio which revealed CAD, followed by cardiac cath which revealed significant CAD and CABG recommended. Pt. very active, walks 4 miles daily, denies chest pain, denies palpitations denies dizziness and denies dyspnea on exertion. 8/7 CABG x 4 (LIMA-LAD, SVG-OM, SVG-Diag, Vasopresin, fluid resuscitation 8/8 Transferred to sdu 8/9 Rapid afib since last luis.  Amio load, increase Lopressor. Diuresis Maintain ct F/U lactate 8/10  Pt in aflutter, hr 128,  multiple ivp lopressor given, hr unchanged. Started on cardizem infusion, hr 100-110. maintain amio/lopressorEliquis started this am 8/11 VSS afib rate 70-90 Cardizem 8 mg /hr k 3.7 repleted 20 x1 ECHO completed Normal LV / normal RV disposition to home  no skilled need 8/12 VSS K 3.7  K20x2 given. Continue amiodarone load -Cardizem 60 mg po q6 Eliquis 2.5mg bid to increase to 5 BID on Sunday -  NPO at midnight cardioversion on Monday 8/13 VSS   K 3.8 kltye 25 x2  amiodarone load to 10 grams per  Dr Altamirano  NPO at midnight Type and screen  PTT/INR in am. 8/14 VSS - aflutter 50-60 - converted to SR 2am - 70s - DCCV cancelled for today.  eliquis 5 bid - amio 10gram load  8/15 DC home; Resume Eliquis am 8/17- Seen by [facility] NP for a f/u home visit. Emotional support and education provided. All questions answered. Pt overall is recovering well. He had concerns in regards to his BP & HR being low as he was also dizzy yesterday evening. He was advised by SUDEEP Jordan to hold his 2nd dose of Metoprolol succ 100mg for last night. He had not taken any Metoprolol succ prior to my visit and was found to have a BP of 112/62 & HR of 66. Advised pt to only take metoprolol succ 100mg QD instead of BID and to monitor his BP/HR BID.

## 2023-08-17 NOTE — PHYSICAL EXAM
[Sclera] : the sclera and conjunctiva were normal [Neck Appearance] : the appearance of the neck was normal [] : no respiratory distress [Respiration, Rhythm And Depth] : normal respiratory rhythm and effort [Exaggerated Use Of Accessory Muscles For Inspiration] : no accessory muscle use [Auscultation Breath Sounds / Voice Sounds] : lungs were clear to auscultation bilaterally [Apical Impulse] : the apical impulse was normal [Heart Rate And Rhythm] : heart rate was normal and rhythm regular [Heart Sounds] : normal S1 and S2 [Heart Sounds Gallop] : no gallops [Murmurs] : no murmurs [Heart Sounds Pericardial Friction Rub] : no pericardial rub [FreeTextEntry1] : MSI, CT sites and B/L leg SVG sites without erythema, drainage or warmth, with edges well approximated. Staples intact at L leg SVG sites. Sternum stable. BL LE 2+ edema (L>R). [Examination Of The Chest] : the chest was normal in appearance [Chest Visual Inspection Thoracic Asymmetry] : no chest asymmetry [Diminished Respiratory Excursion] : normal chest expansion [Bowel Sounds] : normal bowel sounds [Abdomen Soft] : soft [Abdomen Tenderness] : non-tender [Skin Color & Pigmentation] : normal skin color and pigmentation [No Focal Deficits] : no focal deficits [Oriented To Time, Place, And Person] : oriented to person, place, and time [Impaired Insight] : insight and judgment were intact [Affect] : the affect was normal [Mood] : the mood was normal

## 2023-08-17 NOTE — REASON FOR VISIT
No [Post Hospitalization] : a post hospitalization visit [Spouse] : spouse [FreeTextEntry1] : FOLLOW YOUR HEART - Transitional Care Management Program - [facility]

## 2023-08-17 NOTE — PLAN
[TextEntry] : 1) Weigh yourself in the AM prior to eating & drinking. Contact FY team if you note a wt gain of 1-2 lbs overnight or 5 lbs within 1 week. Continue current meds. Keep your legs elevated & ambulate as tolerated. Continue incentive spirometry 10x/hr while awake. Shower using mild soap daily. Your diet should be low salt, low fat, high protein.  2) Call FY team 24/7 w/ any questions, issues, or concerns. My number [phone] was provided to the pt. Explained to pt I personally work from Mon to Fri from 8a to 4p but before or after those hours this number still functions 24/7 and pt will get in touch w/ a team member of CT Surgeon at all times. 3) Report to your FY NP any signs of infection such as redness, swelling, warmth, drainage, or pain at an incision site. Additionally, report to your [facility] NP if you develop a fever. 4) [facility] NP will continue to f/u with pt status. 5) Apply betadine solution BID to L leg incision site that has been draining trace amt of serosanguineous fluid. 6) Remove L leg staples upon f/u visit with Dr Altamirano 7) Please monitor your BP & HR -2x/day and report to NP any concerns in regards to results or any continued dizziness. Please make position changes slowly.  FOLLOW UP APPOINTMENTS: CTSx: Dr. Altamirano on 8-23-23 CARDIOLOGIST: Dr Schumacher on 8-14-23  PCP: Dr Barnett- Pt advised to call to schedule appt within 1 month of discharge.

## 2023-08-17 NOTE — ASSESSMENT
[FreeTextEntry1] : Pt recovering well at home s/p cardiac surgery. Good family support was noted from wife whom is a retired RN. Pt's son is a ER doctor at [employer]. Reviewed all medications and dosages with pt understanding. Pt has all medications in home and is taking as prescribed. Pain controlled with current medication regimen. Pt is aware of F/U appts scheduled and that need to be scheduled as listed below. B/L LE pitting edema noted. Advised pt to keep LE elevated w/ 2 pillows under legs majority of the time & to walk around as tolerated. Pt aware to notify FY team of any developing SOB or wt gain, which pt currently denies having.

## 2023-08-18 ENCOUNTER — TRANSCRIPTION ENCOUNTER (OUTPATIENT)
Age: 68
End: 2023-08-18

## 2023-08-19 ENCOUNTER — TRANSCRIPTION ENCOUNTER (OUTPATIENT)
Age: 68
End: 2023-08-19

## 2023-08-21 ENCOUNTER — TRANSCRIPTION ENCOUNTER (OUTPATIENT)
Age: 68
End: 2023-08-21

## 2023-08-22 ENCOUNTER — APPOINTMENT (OUTPATIENT)
Dept: CARE COORDINATION | Facility: HOME HEALTH | Age: 68
End: 2023-08-22
Payer: MEDICARE

## 2023-08-22 VITALS
RESPIRATION RATE: 14 BRPM | SYSTOLIC BLOOD PRESSURE: 96 MMHG | OXYGEN SATURATION: 93 % | DIASTOLIC BLOOD PRESSURE: 58 MMHG | HEART RATE: 66 BPM

## 2023-08-22 PROCEDURE — 99024 POSTOP FOLLOW-UP VISIT: CPT

## 2023-08-22 RX ORDER — DILTIAZEM HYDROCHLORIDE 240 MG/1
240 CAPSULE, EXTENDED RELEASE ORAL DAILY
Refills: 0 | Status: DISCONTINUED | COMMUNITY
Start: 2023-08-16 | End: 2023-08-22

## 2023-08-22 NOTE — PHYSICAL EXAM
[Sclera] : the sclera and conjunctiva were normal [Neck Appearance] : the appearance of the neck was normal [] : no respiratory distress [Respiration, Rhythm And Depth] : normal respiratory rhythm and effort [Exaggerated Use Of Accessory Muscles For Inspiration] : no accessory muscle use [Auscultation Breath Sounds / Voice Sounds] : lungs were clear to auscultation bilaterally [Apical Impulse] : the apical impulse was normal [Heart Rate And Rhythm] : heart rate was normal and rhythm regular [Heart Sounds] : normal S1 and S2 [Heart Sounds Gallop] : no gallops [Murmurs] : no murmurs [Heart Sounds Pericardial Friction Rub] : no pericardial rub [Examination Of The Chest] : the chest was normal in appearance [Chest Visual Inspection Thoracic Asymmetry] : no chest asymmetry [Diminished Respiratory Excursion] : normal chest expansion [Bowel Sounds] : normal bowel sounds [Abdomen Soft] : soft [Abdomen Tenderness] : non-tender [Skin Color & Pigmentation] : normal skin color and pigmentation [Sensation] : the sensory exam was normal to light touch and pinprick [Motor Exam] : the motor exam was normal [No Focal Deficits] : no focal deficits [Oriented To Time, Place, And Person] : oriented to person, place, and time [Impaired Insight] : insight and judgment were intact [Affect] : the affect was normal [Mood] : the mood was normal [FreeTextEntry1] : + maculopapular rash noted to the B/L thighs, abdomen, & chest

## 2023-08-22 NOTE — HISTORY OF PRESENT ILLNESS
[FreeTextEntry1] :  67 year old male history of HTN s/p CT angio which revealed CAD, followed by cardiac cath which revealed significant CAD and CABG recommended. Pt. very active, walks 4 miles daily, denies chest pain, denies palpitations denies dizziness and denies dyspnea on exertion. 8/7 CABG x 4 (LIMA-LAD, SVG-OM, SVG-Diag, Vasopresin, fluid resuscitation 8/8 Transferred to sdu 8/9 Rapid afib since last luis. Amio load, increase Lopressor. Diuresis Maintain ct F/U lactate 8/10 Pt in aflutter, hr 128, multiple ivp lopressor given, hr unchanged. Started on cardizem infusion, hr 100-110. maintain amio/lopressorEliquis started this am 8/11 VSS afib rate 70-90 Cardizem 8 mg /hr k 3.7 repleted 20 x1 ECHO completed Normal LV / normal RV disposition to home no skilled need 8/12 VSS K 3.7 K20x2 given. Continue amiodarone load -Cardizem 60 mg po q6 Eliquis 2.5mg bid to increase to 5 BID on Sunday - NPO at midnight cardioversion on Monday 8/13 VSS K 3.8 kltye 25 x2 amiodarone load to 10 grams per Dr Altamirano NPO at midnight Type and screen PTT/INR in am. 8/14 VSS - aflutter 50-60 - converted to SR 2am - 70s - DCCV cancelled for today. eliquis 5 bid - amio 10gram load  8/15 DC home; Resume Eliquis am 8/17- Seen by [facility] NP for a f/u home visit. Emotional support and education provided. All questions answered. Pt overall is recovering well. He had concerns in regards to his BP & HR being low as he was also dizzy yesterday evening. He was advised by SUDEEP Jordan to hold his 2nd dose of Metoprolol succ 100mg for last night. He had not taken any Metoprolol succ prior to my visit and was found to have a BP of 112/62 & HR of 66. Advised pt to only take metoprolol succ 100mg QD instead of BID and to monitor his BP/HR BID.   8/22- Pt called [facility] NP to report a rash that developed on Sunday affecting his anterior chest/abd & B/L thighs. [facility] NP made an additional home visit today to further assess. Maculopapular rash noted at chest/abd & b/l thighs. Pt reports last night after he took Cardizem his rash worsened. Cardizem DC'd d/t suspected drug rash. Pt is on Metoprolol succ & Amio for rate control. Pt advised to take Benadryl 25mg PO q8h PRN for itchiness. Citlali Mora NP and Dr Altamirano made aware of the above.

## 2023-08-22 NOTE — ASSESSMENT
[FreeTextEntry1] : Pt recovering well at home s/p cardiac surgery. Good family support was noted from wife whom is a retired RN. Reviewed all medications and dosages with pt understanding. Pt has all medications in home and is taking as prescribed. He is aware to stop taking cardizem as his rash is likely a drug reaction from it. HR is well controlled. Pain controlled with current medication regimen. I removed the LLE SVG site staples & MSI tape dsg upon my home visit today. Pt is aware of F/U appts recommended.

## 2023-08-22 NOTE — REVIEW OF SYSTEMS
[Lower Ext Edema] : lower extremity edema [Itching] : itching [Negative] : Heme/Lymph [de-identified] : Rash to chest/abd/thighs

## 2023-08-22 NOTE — PLAN
[TextEntry] : 1) Weigh yourself in the AM prior to eating & drinking. Contact [facility] team if you note a wt gain of 1-2 lbs overnight or 5 lbs within 1 week. Continue current meds. Keep your legs elevated & ambulate as tolerated. Continue incentive spirometry 10x/hr while awake. Shower using mild soap daily. Your diet should be low salt, low fat, high protein.  2) Call [facility] team 24/7 w/ any questions, issues, or concerns. My number [phone] was provided to the pt. Explained to pt I personally work from Mon to Fri from 8a to 4p but before or after those hours this number still functions 24/7 and pt will get in touch w/ a team member of CT Surgeon at all times. 3) Report to your [facility] NP any signs of infection such as redness, swelling, warmth, drainage, or pain at an incision site. Additionally, report to your [facility] NP if you develop a fever. 4) [facility] NP will continue to f/u with pt status. 5) STOP taking Cardizem as your rash may be d/t the medication. Please make [facility] NP or CTS[facility] office team aware of any worsening of your rash. 6) Take Benadryl 25mg PO q8h PRN for itchiness.  FOLLOW UP APPOINTMENTS: CTSx: MD Adarsh on 8/23/23 CARDIOLOGIST: MD Endy on 9-14-23 PCP: Dr Barnett- Pt advised to call to schedule appt within 1 month of discharge.

## 2023-08-23 ENCOUNTER — APPOINTMENT (OUTPATIENT)
Dept: CARDIOTHORACIC SURGERY | Facility: CLINIC | Age: 68
End: 2023-08-23
Payer: MEDICARE

## 2023-08-23 ENCOUNTER — LABORATORY RESULT (OUTPATIENT)
Age: 68
End: 2023-08-23

## 2023-08-23 VITALS — SYSTOLIC BLOOD PRESSURE: 94 MMHG | HEIGHT: 70 IN | DIASTOLIC BLOOD PRESSURE: 57 MMHG | TEMPERATURE: 98 F

## 2023-08-23 VITALS — HEART RATE: 76 BPM | SYSTOLIC BLOOD PRESSURE: 90 MMHG | DIASTOLIC BLOOD PRESSURE: 50 MMHG

## 2023-08-23 DIAGNOSIS — B36.9 SUPERFICIAL MYCOSIS, UNSPECIFIED: ICD-10-CM

## 2023-08-23 PROCEDURE — 99024 POSTOP FOLLOW-UP VISIT: CPT

## 2023-08-23 RX ORDER — OXYCODONE 5 MG/1
5 TABLET ORAL
Qty: 10 | Refills: 0 | Status: DISCONTINUED | COMMUNITY
Start: 2023-05-09

## 2023-08-23 RX ORDER — SPIRONOLACTONE 25 MG/1
25 TABLET ORAL DAILY
Qty: 5 | Refills: 0 | Status: COMPLETED | COMMUNITY
Start: 2023-08-16 | End: 2023-08-23

## 2023-08-23 RX ORDER — TORSEMIDE 10 MG/1
10 TABLET ORAL DAILY
Refills: 0 | Status: COMPLETED | COMMUNITY
Start: 2023-08-16 | End: 2023-08-23

## 2023-08-23 RX ORDER — AZITHROMYCIN 250 MG/1
250 TABLET, FILM COATED ORAL
Qty: 6 | Refills: 0 | Status: DISCONTINUED | COMMUNITY
Start: 2023-07-25

## 2023-08-23 NOTE — PHYSICAL EXAM
[] : no respiratory distress [Respiration, Rhythm And Depth] : normal respiratory rhythm and effort [Clean] : clean [Dry] : dry [Healing Well] : healing well [No Edema] : no edema

## 2023-08-23 NOTE — ASSESSMENT
[FreeTextEntry1] : 67 year old male history of HTN s/p CT angio which revealed CAD, followed by cardiac cath which revealed significant CAD and CABG recommended. Pt. very active, walks 4 miles daily, denies chest pain, denies palpitations denies dizziness and denies dyspnea on exertion. On 8/7 CABG x 4 (LIMA-LAD, SVG-OM, SVG-Diag,  Pt called [facility] NP to report a rash that developed on Sunday affecting his anterior chest/abd & B/L thighs. [facility] NP made an additional home visit today to further assess. Maculopapular rash noted at chest/abd & b/l thighs. Pt reports last night after he took Cardizem his rash worsened. Cardizem DC'd d/t suspected drug rash. Pt is on Metoprolol succ & Amio for rate control.   Pt advised to take Benadryl 25mg PO q8h PRN for itchiness. Citlali Mora, SUDEEP and Dr Altamirano made aware of the above.  Plan: 1) Continue to hold Cardizem 2) Start Prednisone 20mg daily x 5  3) Hold Diuretics  4) Return in one week for clinical recheck and TTE

## 2023-08-24 ENCOUNTER — NON-APPOINTMENT (OUTPATIENT)
Age: 68
End: 2023-08-24

## 2023-08-30 ENCOUNTER — LABORATORY RESULT (OUTPATIENT)
Age: 68
End: 2023-08-30

## 2023-08-30 ENCOUNTER — APPOINTMENT (OUTPATIENT)
Dept: CARDIOTHORACIC SURGERY | Facility: CLINIC | Age: 68
End: 2023-08-30
Payer: MEDICARE

## 2023-08-30 ENCOUNTER — OUTPATIENT (OUTPATIENT)
Dept: OUTPATIENT SERVICES | Facility: HOSPITAL | Age: 68
LOS: 1 days | End: 2023-08-30
Payer: MEDICARE

## 2023-08-30 VITALS
HEART RATE: 62 BPM | HEIGHT: 70 IN | TEMPERATURE: 98 F | WEIGHT: 183 LBS | BODY MASS INDEX: 26.2 KG/M2 | RESPIRATION RATE: 15 BRPM | SYSTOLIC BLOOD PRESSURE: 104 MMHG | OXYGEN SATURATION: 98 % | DIASTOLIC BLOOD PRESSURE: 68 MMHG

## 2023-08-30 DIAGNOSIS — I35.0 NONRHEUMATIC AORTIC (VALVE) STENOSIS: ICD-10-CM

## 2023-08-30 DIAGNOSIS — Z98.890 OTHER SPECIFIED POSTPROCEDURAL STATES: Chronic | ICD-10-CM

## 2023-08-30 DIAGNOSIS — N17.9 ACUTE KIDNEY FAILURE, UNSPECIFIED: ICD-10-CM

## 2023-08-30 LAB
ALBUMIN SERPL ELPH-MCNC: 3.8 G/DL
ALP BLD-CCNC: 60 U/L
ALT SERPL-CCNC: 19 U/L
ANION GAP SERPL CALC-SCNC: 11 MMOL/L
AST SERPL-CCNC: 12 U/L
BILIRUB SERPL-MCNC: 0.3 MG/DL
BUN SERPL-MCNC: 29 MG/DL
CALCIUM SERPL-MCNC: 9 MG/DL
CHLORIDE SERPL-SCNC: 102 MMOL/L
CO2 SERPL-SCNC: 24 MMOL/L
CREAT SERPL-MCNC: 1.91 MG/DL
EGFR: 37 ML/MIN/1.73M2
GLUCOSE SERPL-MCNC: 99 MG/DL
POTASSIUM SERPL-SCNC: 5.5 MMOL/L
PROT SERPL-MCNC: 6.2 G/DL
SODIUM SERPL-SCNC: 136 MMOL/L

## 2023-08-30 PROCEDURE — 93306 TTE W/DOPPLER COMPLETE: CPT

## 2023-08-30 PROCEDURE — 93306 TTE W/DOPPLER COMPLETE: CPT | Mod: 26

## 2023-08-30 PROCEDURE — 93356 MYOCRD STRAIN IMG SPCKL TRCK: CPT | Mod: 26

## 2023-08-30 PROCEDURE — 99024 POSTOP FOLLOW-UP VISIT: CPT

## 2023-08-30 PROCEDURE — 93356 MYOCRD STRAIN IMG SPCKL TRCK: CPT

## 2023-08-30 RX ORDER — PREDNISONE 20 MG/1
20 TABLET ORAL DAILY
Qty: 5 | Refills: 0 | Status: COMPLETED | COMMUNITY
Start: 2023-08-23 | End: 2023-08-30

## 2023-08-30 NOTE — PHYSICAL EXAM
[] : no respiratory distress [Heart Rate And Rhythm] : heart rate was normal and rhythm regular [Respiration, Rhythm And Depth] : normal respiratory rhythm and effort [Clean] : clean [Dry] : dry [Healing Well] : healing well [No Edema] : no edema

## 2023-08-30 NOTE — ASSESSMENT
[FreeTextEntry1] : 67-year-old male history of HTN s/p CT Angio which revealed CAD, followed by cardiac Cath which revealed significant CAD and CABG recommended. Pt. very active, walks 4 miles daily, denies chest pain, denies palpitations denies dizziness and denies dyspnea on exertion. On 8/7 CABG x 4 (LIMA-LAD, SVG-OM, SVG-Diag,  Pt called [facility] NP to report a rash that developed on Sunday affecting his anterior chest/Abd & B/L thighs. [facility] NP made an additional home visit today to further assess. Maculopapular rash noted at chest/Abd & bilateral thighs.  Today on exam bilateral lung fields are clear, no wheezing or rales, no use of accessory muscles noted or respiratory distress, normal sinus rhythm, sternum stable, incision clean, dry and intact.   SVG site is clean, dry and intact.  No peripheral edema noted.   Instructed patient on importance of optimal glycemic control, daily showering, daily weights and no heavy lifting. Instructed to call office with any signs of fever (temperature greater than 101F, chills, increasing shortness of breath, palpitations or lightheadedness or syncope. Also reinforced with patient to call office if any weight gain of 2 or more pounds in 1 day or 3 or more pounds in 1 week.   Discussed intake of plant-based foods, including vegetables, fruits, and whole grain foods: legumes, nuts and seeds, fish or seafood, lean meats, and non-fat or low-fat dairy foods. Plant based oils (non-tropical) in place of solid fats. Instructed patient to limit intake of high fat meats and processed meats, high-fat dairy foods, dietary cholesterol and sodium, foods and beverages with added sugars.   Plan: 1) Continue current medication regimen 2) Follow up with cardiologist 3) Increase activity as tolerated 4)Virtual cardiac rehab 5) Seek medical attention if you experience chest pain, palpitations, shortness of breath, persistent nausea and vomiting oozing from wounds or pain not relieved with medication 6) Notify surgeon for any temperature greater than 101 F 7) Keep legs elevated when sitting 8) Registered dietitian visit 9) May return on as needed basis  10) SBE antibiotic prophylaxis discussed

## 2023-08-30 NOTE — DISCUSSION/SUMMARY
[Doing Well] : is doing well [Excellent Pain Control] : has excellent pain control [No Sign of Infection] : is showing no signs of infection [0] : 0 [Coumadin] : the patient is not on Coumadin [de-identified] : Eliquis BID

## 2023-08-30 NOTE — COUNSELING
[Hygeine (Including Daily Shower)] : hygeine (including daily shower) [Importance of Regular Medical Follow-Up] : the importance of regular medical follow-up [No Heavy Lifting] : no heavy lifting (>15-20 lb. for 1 month or 25 lb. for 3 months from date of surgery) [Blood Pressure Control] : blood pressure control [S/S of infection] : signs and symptoms of infection (and to whom it should be reported) [Progressive Ambulation/Activity] : progressive ambulation/activity [Medication/Vitamin/Herb/Food Interaction] : medication/vitamin/herb/food interaction [Stress Management] : stress management [Low Fat/Low Cholesterol Diet] : low fat/low cholesterol diet

## 2023-08-31 PROBLEM — N17.9 ACUTE RENAL FAILURE: Status: ACTIVE | Noted: 2023-08-31

## 2023-09-14 ENCOUNTER — APPOINTMENT (OUTPATIENT)
Dept: CARDIOLOGY | Facility: CLINIC | Age: 68
End: 2023-09-14
Payer: MEDICARE

## 2023-09-14 VITALS
WEIGHT: 183 LBS | HEIGHT: 61 IN | OXYGEN SATURATION: 96 % | HEART RATE: 53 BPM | SYSTOLIC BLOOD PRESSURE: 122 MMHG | DIASTOLIC BLOOD PRESSURE: 77 MMHG | BODY MASS INDEX: 34.55 KG/M2

## 2023-09-14 DIAGNOSIS — I47.29 OTHER VENTRICULAR TACHYCARDIA: ICD-10-CM

## 2023-09-14 DIAGNOSIS — Z95.1 PRESENCE OF AORTOCORONARY BYPASS GRAFT: ICD-10-CM

## 2023-09-14 PROCEDURE — 93000 ELECTROCARDIOGRAM COMPLETE: CPT

## 2023-09-14 PROCEDURE — 99215 OFFICE O/P EST HI 40 MIN: CPT

## 2023-09-15 ENCOUNTER — LABORATORY RESULT (OUTPATIENT)
Age: 68
End: 2023-09-15

## 2023-09-15 ENCOUNTER — APPOINTMENT (OUTPATIENT)
Dept: CARDIOTHORACIC SURGERY | Facility: CLINIC | Age: 68
End: 2023-09-15
Payer: MEDICARE

## 2023-09-15 PROCEDURE — 97802 MEDICAL NUTRITION INDIV IN: CPT

## 2023-10-06 ENCOUNTER — NON-APPOINTMENT (OUTPATIENT)
Age: 68
End: 2023-10-06

## 2023-11-13 PROCEDURE — C1889: CPT

## 2023-11-13 PROCEDURE — 97110 THERAPEUTIC EXERCISES: CPT

## 2023-11-13 PROCEDURE — 86965 POOLING BLOOD PLATELETS: CPT

## 2023-11-13 PROCEDURE — P9059: CPT

## 2023-11-13 PROCEDURE — 97165 OT EVAL LOW COMPLEX 30 MIN: CPT

## 2023-11-13 PROCEDURE — 97162 PT EVAL MOD COMPLEX 30 MIN: CPT

## 2023-11-13 PROCEDURE — 93005 ELECTROCARDIOGRAM TRACING: CPT

## 2023-11-13 PROCEDURE — 84295 ASSAY OF SERUM SODIUM: CPT

## 2023-11-13 PROCEDURE — 86923 COMPATIBILITY TEST ELECTRIC: CPT

## 2023-11-13 PROCEDURE — 36415 COLL VENOUS BLD VENIPUNCTURE: CPT

## 2023-11-13 PROCEDURE — 85396 CLOTTING ASSAY WHOLE BLOOD: CPT

## 2023-11-13 PROCEDURE — P9012: CPT

## 2023-11-13 PROCEDURE — 71045 X-RAY EXAM CHEST 1 VIEW: CPT

## 2023-11-13 PROCEDURE — 84132 ASSAY OF SERUM POTASSIUM: CPT

## 2023-11-13 PROCEDURE — 97116 GAIT TRAINING THERAPY: CPT

## 2023-11-13 PROCEDURE — 92610 EVALUATE SWALLOWING FUNCTION: CPT

## 2023-11-13 PROCEDURE — C1751: CPT

## 2023-11-13 PROCEDURE — 97530 THERAPEUTIC ACTIVITIES: CPT

## 2023-11-13 PROCEDURE — 85014 HEMATOCRIT: CPT

## 2023-11-13 PROCEDURE — 82550 ASSAY OF CK (CPK): CPT

## 2023-11-13 PROCEDURE — 85520 HEPARIN ASSAY: CPT

## 2023-11-13 PROCEDURE — 82803 BLOOD GASES ANY COMBINATION: CPT

## 2023-11-13 PROCEDURE — 85018 HEMOGLOBIN: CPT

## 2023-11-13 PROCEDURE — 82435 ASSAY OF BLOOD CHLORIDE: CPT

## 2023-11-13 PROCEDURE — 85384 FIBRINOGEN ACTIVITY: CPT

## 2023-11-13 PROCEDURE — 82553 CREATINE MB FRACTION: CPT

## 2023-11-13 PROCEDURE — P9100: CPT

## 2023-11-13 PROCEDURE — 80053 COMPREHEN METABOLIC PANEL: CPT

## 2023-11-13 PROCEDURE — 97535 SELF CARE MNGMENT TRAINING: CPT

## 2023-11-13 PROCEDURE — 85025 COMPLETE CBC W/AUTO DIFF WBC: CPT

## 2023-11-13 PROCEDURE — 85027 COMPLETE CBC AUTOMATED: CPT

## 2023-11-13 PROCEDURE — P9037: CPT

## 2023-11-13 PROCEDURE — 82565 ASSAY OF CREATININE: CPT

## 2023-11-13 PROCEDURE — 83735 ASSAY OF MAGNESIUM: CPT

## 2023-11-13 PROCEDURE — 86900 BLOOD TYPING SEROLOGIC ABO: CPT

## 2023-11-13 PROCEDURE — 85730 THROMBOPLASTIN TIME PARTIAL: CPT

## 2023-11-13 PROCEDURE — 82330 ASSAY OF CALCIUM: CPT

## 2023-11-13 PROCEDURE — 85610 PROTHROMBIN TIME: CPT

## 2023-11-13 PROCEDURE — 84443 ASSAY THYROID STIM HORMONE: CPT

## 2023-11-13 PROCEDURE — 86850 RBC ANTIBODY SCREEN: CPT

## 2023-11-13 PROCEDURE — 94002 VENT MGMT INPAT INIT DAY: CPT

## 2023-11-13 PROCEDURE — 83605 ASSAY OF LACTIC ACID: CPT

## 2023-11-13 PROCEDURE — 86891 AUTOLOGOUS BLOOD OP SALVAGE: CPT

## 2023-11-13 PROCEDURE — 82947 ASSAY GLUCOSE BLOOD QUANT: CPT

## 2023-11-13 PROCEDURE — P9045: CPT

## 2023-11-13 PROCEDURE — 86901 BLOOD TYPING SEROLOGIC RH(D): CPT

## 2023-11-13 PROCEDURE — 93306 TTE W/DOPPLER COMPLETE: CPT

## 2023-11-13 PROCEDURE — 84484 ASSAY OF TROPONIN QUANT: CPT

## 2023-11-13 PROCEDURE — 82962 GLUCOSE BLOOD TEST: CPT

## 2023-11-13 PROCEDURE — P9016: CPT

## 2023-11-13 PROCEDURE — 80048 BASIC METABOLIC PNL TOTAL CA: CPT

## 2023-11-13 PROCEDURE — C1769: CPT

## 2023-11-13 PROCEDURE — 84100 ASSAY OF PHOSPHORUS: CPT

## 2023-11-15 ENCOUNTER — APPOINTMENT (OUTPATIENT)
Dept: CARDIOTHORACIC SURGERY | Facility: CLINIC | Age: 68
End: 2023-11-15

## 2023-11-15 ENCOUNTER — NON-APPOINTMENT (OUTPATIENT)
Age: 68
End: 2023-11-15

## 2023-11-15 VITALS
DIASTOLIC BLOOD PRESSURE: 91 MMHG | SYSTOLIC BLOOD PRESSURE: 156 MMHG | RESPIRATION RATE: 16 BRPM | OXYGEN SATURATION: 97 % | HEART RATE: 52 BPM | TEMPERATURE: 97.7 F

## 2023-11-15 VITALS — HEIGHT: 61 IN | WEIGHT: 183 LBS | BODY MASS INDEX: 34.55 KG/M2

## 2023-11-20 ENCOUNTER — NON-APPOINTMENT (OUTPATIENT)
Age: 68
End: 2023-11-20

## 2023-11-28 ENCOUNTER — NON-APPOINTMENT (OUTPATIENT)
Age: 68
End: 2023-11-28

## 2023-11-28 ENCOUNTER — APPOINTMENT (OUTPATIENT)
Dept: CARDIOLOGY | Facility: CLINIC | Age: 68
End: 2023-11-28
Payer: MEDICARE

## 2023-11-28 VITALS
SYSTOLIC BLOOD PRESSURE: 141 MMHG | DIASTOLIC BLOOD PRESSURE: 87 MMHG | WEIGHT: 181 LBS | BODY MASS INDEX: 25.91 KG/M2 | HEART RATE: 50 BPM | OXYGEN SATURATION: 95 % | HEIGHT: 70 IN

## 2023-11-28 DIAGNOSIS — I47.10 SUPRAVENTRICULAR TACHYCARDIA, UNSPECIFIED: ICD-10-CM

## 2023-11-28 PROCEDURE — 99214 OFFICE O/P EST MOD 30 MIN: CPT

## 2023-11-28 PROCEDURE — 93000 ELECTROCARDIOGRAM COMPLETE: CPT

## 2023-11-28 RX ORDER — LOSARTAN POTASSIUM 25 MG/1
25 TABLET, FILM COATED ORAL DAILY
Qty: 90 | Refills: 2 | Status: ACTIVE | COMMUNITY
Start: 2023-11-28 | End: 1900-01-01

## 2023-11-28 RX ORDER — AMIODARONE HYDROCHLORIDE 100 MG/1
100 TABLET ORAL DAILY
Qty: 90 | Refills: 2 | Status: DISCONTINUED | COMMUNITY
Start: 2023-08-16 | End: 2023-11-28

## 2023-11-28 RX ORDER — METOPROLOL SUCCINATE 50 MG/1
50 TABLET, EXTENDED RELEASE ORAL
Qty: 270 | Refills: 1 | Status: ACTIVE | COMMUNITY
Start: 2023-08-16 | End: 1900-01-01

## 2023-11-30 RX ORDER — ATORVASTATIN CALCIUM 40 MG/1
40 TABLET, FILM COATED ORAL
Qty: 180 | Refills: 2 | Status: ACTIVE | COMMUNITY
Start: 2023-07-20

## 2023-12-05 ENCOUNTER — APPOINTMENT (OUTPATIENT)
Dept: CARDIOLOGY | Facility: CLINIC | Age: 68
End: 2023-12-05

## 2023-12-05 ENCOUNTER — NON-APPOINTMENT (OUTPATIENT)
Age: 68
End: 2023-12-05

## 2023-12-19 ENCOUNTER — APPOINTMENT (OUTPATIENT)
Dept: CARDIOLOGY | Facility: CLINIC | Age: 68
End: 2023-12-19

## 2024-02-06 ENCOUNTER — NON-APPOINTMENT (OUTPATIENT)
Age: 69
End: 2024-02-06

## 2024-02-14 ENCOUNTER — NON-APPOINTMENT (OUTPATIENT)
Age: 69
End: 2024-02-14

## 2024-02-22 NOTE — ED ADULT NURSE NOTE - NSFALLRSKASSESSDT_ED_ALL_ED
[No Acute Distress] : no acute distress [Well Nourished] : well nourished [Well Developed] : well developed [Normal Sclera/Conjunctiva] : normal sclera/conjunctiva [Supple] : supple [No Respiratory Distress] : no respiratory distress  [No Accessory Muscle Use] : no accessory muscle use [Clear to Auscultation] : lungs were clear to auscultation bilaterally [Normal Rate] : normal rate  [Regular Rhythm] : with a regular rhythm [Normal S1, S2] : normal S1 and S2 [Soft] : abdomen soft [Non-distended] : non-distended [Non Tender] : non-tender [Normal Bowel Sounds] : normal bowel sounds [Grossly Normal Strength/Tone] : grossly normal strength/tone [Coordination Grossly Intact] : coordination grossly intact [No Focal Deficits] : no focal deficits [Normal Gait] : normal gait [Speech Grossly Normal] : speech grossly normal [Normal Affect] : the affect was normal [Memory Grossly Normal] : memory grossly normal [Alert and Oriented x3] : oriented to person, place, and time 06-Oct-2022 19:39

## 2024-03-12 ENCOUNTER — NON-APPOINTMENT (OUTPATIENT)
Age: 69
End: 2024-03-12

## 2024-03-12 ENCOUNTER — APPOINTMENT (OUTPATIENT)
Dept: CARDIOLOGY | Facility: CLINIC | Age: 69
End: 2024-03-12
Payer: MEDICARE

## 2024-03-12 VITALS
HEIGHT: 70 IN | DIASTOLIC BLOOD PRESSURE: 89 MMHG | SYSTOLIC BLOOD PRESSURE: 136 MMHG | OXYGEN SATURATION: 97 % | HEART RATE: 57 BPM

## 2024-03-12 DIAGNOSIS — I25.10 ATHEROSCLEROTIC HEART DISEASE OF NATIVE CORONARY ARTERY W/OUT ANGINA PECTORIS: ICD-10-CM

## 2024-03-12 DIAGNOSIS — E78.5 HYPERLIPIDEMIA, UNSPECIFIED: ICD-10-CM

## 2024-03-12 PROCEDURE — 93000 ELECTROCARDIOGRAM COMPLETE: CPT

## 2024-03-12 PROCEDURE — G2211 COMPLEX E/M VISIT ADD ON: CPT

## 2024-03-12 PROCEDURE — 99215 OFFICE O/P EST HI 40 MIN: CPT

## 2024-03-12 RX ORDER — APIXABAN 2.5 MG/1
2.5 TABLET, FILM COATED ORAL
Qty: 180 | Refills: 3 | Status: DISCONTINUED | COMMUNITY
Start: 2023-08-16 | End: 2024-03-12

## 2024-03-12 NOTE — DISCUSSION/SUMMARY
[FreeTextEntry1] : 68 year man with a history as listed presents for a followup cardiac evaluation.  Neymar's is doing well. He denies any anginal symptoms. Clinically he is euvolemic on exam. His EKG did not reveal any significant ischemic changes. He will continue ASA.  He has discontinued with amiodarone 100 mg daily. We will continue with Toprol 50mg q8 (pt preference on dosing).   He has a known dilated left atrium.  I think that he has had paroxysmal atrial fibrillation for quite some time. His zio still showed an irregular PSVT off of Amio. I have counseled him on taking Eliquis long-term but he does not want to. He understands that he is high risk for a CVA.  He will continue losartan 25mg Qday.  He does not want to get a sleep study. He will continue with statin therapy to achieve maintain goal LDL<100 or ideally <70.  Exercise and diet counseling was performed in order to reduce her future cardiovascular risk.  He will followup with me in 3  months or sooner if necessary.  [EKG obtained to assist in diagnosis and management of assessed problem(s)] : EKG obtained to assist in diagnosis and management of assessed problem(s)

## 2024-03-12 NOTE — CARDIOLOGY SUMMARY
[de-identified] : 2/15/24 SR 2 NSVT episodes. PSVT episode of irregular PSVT likely PAF.  [de-identified] : Sinus Bradycardia  -Incomplete right bundle branch block.  [de-identified] : 7/18/2023: Proximal LAD 85% stenosis first OM 85% stenosis PDA 80% stenosis [de-identified] : 12/2022 normal LV function.  Severely dilated left atrium. [de-identified] : s/p CABG with LIMA to LAD, SVG to D1, SVG to Lcx, SVG to PDA

## 2024-03-12 NOTE — HISTORY OF PRESENT ILLNESS
[FreeTextEntry1] : 68 year old man with history of CAD s/p CABG s/p LIMA to LAD, SVG to D1, SVG to Lcx, SVG to PDA,  PAF on AC, lymphoma, HTN, BPH presents for a followup cardiac evaluation.   Since his last visit, he is doing well overall. he has been still having palpitations on occasionally a few times a week.  He is trying to stay active.  he denies any dyspnea on exertion. He   denies any chest pain, PND, orthopnea, near syncope, syncope, stroke like symptoms. He has mild left lower extremity edema.   Medication reconciliation performed. He is compliant with his medications.

## 2024-03-12 NOTE — PHYSICAL EXAM
[Well Groomed] : well groomed [General Appearance - In No Acute Distress] : no acute distress [Normal Conjunctiva] : the conjunctiva exhibited no abnormalities [Eyelids - No Xanthelasma] : the eyelids demonstrated no xanthelasmas [Normal Oral Mucosa] : normal oral mucosa [No Oral Pallor] : no oral pallor [No Oral Cyanosis] : no oral cyanosis [Normal Jugular Venous A Waves Present] : normal jugular venous A waves present [Normal Jugular Venous V Waves Present] : normal jugular venous V waves present [No Jugular Venous Coffey A Waves] : no jugular venous coffey A waves [Respiration, Rhythm And Depth] : normal respiratory rhythm and effort [Auscultation Breath Sounds / Voice Sounds] : lungs were clear to auscultation bilaterally [Exaggerated Use Of Accessory Muscles For Inspiration] : no accessory muscle use [Abdomen Soft] : soft [Abdomen Tenderness] : non-tender [Abdomen Mass (___ Cm)] : no abdominal mass palpated [Abnormal Walk] : normal gait [Gait - Sufficient For Exercise Testing] : the gait was sufficient for exercise testing [Skin Color & Pigmentation] : normal skin color and pigmentation [No Venous Stasis] : no venous stasis [] : no rash [Skin Lesions] : no skin lesions [No Skin Ulcers] : no skin ulcer [No Xanthoma] : no  xanthoma was observed [Oriented To Time, Place, And Person] : oriented to person, place, and time [Affect] : the affect was normal [Mood] : the mood was normal [No Anxiety] : not feeling anxious [Normal Rate] : normal [Rhythm Regular] : regular [Normal S1] : normal S1 [Normal S2] : normal S2 [No Murmur] : no murmurs heard [No Gallop] : no gallop heard [2+] : left 2+ [Left Carotid Bruit] : no bruit heard over the left carotid [Right Carotid Bruit] : no bruit heard over the right carotid [Bruit] : no bruit heard [No Pitting Edema] : no pitting edema present

## 2024-03-13 LAB
ALBUMIN SERPL ELPH-MCNC: 4.1 G/DL
ALP BLD-CCNC: 43 U/L
ALT SERPL-CCNC: 19 U/L
ANION GAP SERPL CALC-SCNC: 8 MMOL/L
AST SERPL-CCNC: 21 U/L
BASOPHILS # BLD AUTO: 0.02 K/UL
BASOPHILS NFR BLD AUTO: 0.5 %
BILIRUB SERPL-MCNC: 0.7 MG/DL
BUN SERPL-MCNC: 13 MG/DL
CALCIUM SERPL-MCNC: 8.9 MG/DL
CHLORIDE SERPL-SCNC: 107 MMOL/L
CHOLEST SERPL-MCNC: 118 MG/DL
CO2 SERPL-SCNC: 27 MMOL/L
CREAT SERPL-MCNC: 1.21 MG/DL
EGFR: 64 ML/MIN/1.73M2
EOSINOPHIL # BLD AUTO: 0.09 K/UL
EOSINOPHIL NFR BLD AUTO: 2.2 %
ESTIMATED AVERAGE GLUCOSE: 103 MG/DL
FERRITIN SERPL-MCNC: 102 NG/ML
GLUCOSE SERPL-MCNC: 111 MG/DL
HBA1C MFR BLD HPLC: 5.2 %
HCT VFR BLD CALC: 43.8 %
HDLC SERPL-MCNC: 51 MG/DL
HGB BLD-MCNC: 14.6 G/DL
IMM GRANULOCYTES NFR BLD AUTO: 0 %
IRON SATN MFR SERPL: 32 %
IRON SERPL-MCNC: 89 UG/DL
LDLC SERPL CALC-MCNC: 51 MG/DL
LYMPHOCYTES # BLD AUTO: 1.09 K/UL
LYMPHOCYTES NFR BLD AUTO: 26.3 %
MAN DIFF?: NORMAL
MCHC RBC-ENTMCNC: 31.7 PG
MCHC RBC-ENTMCNC: 33.3 GM/DL
MCV RBC AUTO: 95 FL
MONOCYTES # BLD AUTO: 0.5 K/UL
MONOCYTES NFR BLD AUTO: 12.1 %
NEUTROPHILS # BLD AUTO: 2.44 K/UL
NEUTROPHILS NFR BLD AUTO: 58.9 %
NONHDLC SERPL-MCNC: 66 MG/DL
PLATELET # BLD AUTO: 151 K/UL
POTASSIUM SERPL-SCNC: 5.2 MMOL/L
PROT SERPL-MCNC: 6.5 G/DL
RBC # BLD: 4.61 M/UL
RBC # FLD: 14.4 %
SODIUM SERPL-SCNC: 142 MMOL/L
TIBC SERPL-MCNC: 279 UG/DL
TRIGL SERPL-MCNC: 78 MG/DL
TSH SERPL-ACNC: 0.4 UIU/ML
UIBC SERPL-MCNC: 191 UG/DL
WBC # FLD AUTO: 4.14 K/UL

## 2024-07-09 ENCOUNTER — APPOINTMENT (OUTPATIENT)
Dept: CARDIOLOGY | Facility: CLINIC | Age: 69
End: 2024-07-09

## 2024-11-14 ENCOUNTER — APPOINTMENT (OUTPATIENT)
Dept: CARDIOLOGY | Facility: CLINIC | Age: 69
End: 2024-11-14
Payer: MEDICARE

## 2024-11-14 ENCOUNTER — NON-APPOINTMENT (OUTPATIENT)
Age: 69
End: 2024-11-14

## 2024-11-14 VITALS — DIASTOLIC BLOOD PRESSURE: 82 MMHG | SYSTOLIC BLOOD PRESSURE: 134 MMHG

## 2024-11-14 VITALS
DIASTOLIC BLOOD PRESSURE: 89 MMHG | HEIGHT: 70 IN | WEIGHT: 178 LBS | HEART RATE: 53 BPM | SYSTOLIC BLOOD PRESSURE: 152 MMHG | OXYGEN SATURATION: 98 % | BODY MASS INDEX: 25.48 KG/M2

## 2024-11-14 DIAGNOSIS — I47.10 SUPRAVENTRICULAR TACHYCARDIA, UNSPECIFIED: ICD-10-CM

## 2024-11-14 DIAGNOSIS — I25.10 ATHEROSCLEROTIC HEART DISEASE OF NATIVE CORONARY ARTERY W/OUT ANGINA PECTORIS: ICD-10-CM

## 2024-11-14 DIAGNOSIS — I47.29 OTHER VENTRICULAR TACHYCARDIA: ICD-10-CM

## 2024-11-14 PROCEDURE — 93000 ELECTROCARDIOGRAM COMPLETE: CPT

## 2024-11-14 PROCEDURE — 99214 OFFICE O/P EST MOD 30 MIN: CPT

## 2024-11-14 PROCEDURE — G2211 COMPLEX E/M VISIT ADD ON: CPT

## 2024-11-14 RX ORDER — OLMESARTAN MEDOXOMIL 20 MG/1
20 TABLET, FILM COATED ORAL
Qty: 90 | Refills: 1 | Status: ACTIVE | COMMUNITY
Start: 2024-11-14 | End: 1900-01-01

## 2024-11-21 ENCOUNTER — APPOINTMENT (OUTPATIENT)
Dept: CARDIOLOGY | Facility: CLINIC | Age: 69
End: 2024-11-21

## 2024-11-21 PROCEDURE — 93306 TTE W/DOPPLER COMPLETE: CPT

## 2025-01-16 ENCOUNTER — APPOINTMENT (OUTPATIENT)
Dept: ULTRASOUND IMAGING | Facility: CLINIC | Age: 70
End: 2025-01-16
Payer: MEDICARE

## 2025-01-16 PROCEDURE — 76770 US EXAM ABDO BACK WALL COMP: CPT

## 2025-07-03 ENCOUNTER — NON-APPOINTMENT (OUTPATIENT)
Age: 70
End: 2025-07-03

## 2025-07-03 ENCOUNTER — APPOINTMENT (OUTPATIENT)
Dept: CARDIOLOGY | Facility: CLINIC | Age: 70
End: 2025-07-03
Payer: MEDICARE

## 2025-07-03 VITALS
HEART RATE: 69 BPM | HEIGHT: 70 IN | DIASTOLIC BLOOD PRESSURE: 78 MMHG | WEIGHT: 185 LBS | OXYGEN SATURATION: 96 % | SYSTOLIC BLOOD PRESSURE: 118 MMHG | BODY MASS INDEX: 26.48 KG/M2

## 2025-07-03 PROCEDURE — 99215 OFFICE O/P EST HI 40 MIN: CPT

## 2025-07-03 PROCEDURE — 93000 ELECTROCARDIOGRAM COMPLETE: CPT

## 2025-07-09 RX ORDER — APIXABAN 5 MG/1
5 TABLET, FILM COATED ORAL
Qty: 90 | Refills: 3 | Status: ACTIVE | COMMUNITY
Start: 2025-07-08

## 2025-07-17 ENCOUNTER — APPOINTMENT (OUTPATIENT)
Dept: ELECTROPHYSIOLOGY | Facility: CLINIC | Age: 70
End: 2025-07-17

## 2025-07-17 NOTE — PATIENT PROFILE ADULT - FUNCTIONAL ASSESSMENT - BASIC MOBILITY SECTION LABEL
I have completed the paperwork and asked Sabi to fax it so it should be in    Lucretia Tamayo MD    
Thanks for confirming!  
The patient called to advise that her  has not yet received the following:  -Required paperwork ( both providers)  -Progress notes ( from EDWIGE Tamayo)    Shireen has requested the paperwork and progress notes to be faxed to:  [phone]     Name:  Sulma HERRERA  Phone: [phone]    Please contact Shireen when this has been resolved, for communication purposes:  [phone]    Thank you!  
.

## 2025-08-06 ENCOUNTER — RX RENEWAL (OUTPATIENT)
Age: 70
End: 2025-08-06

## 2025-08-08 DIAGNOSIS — R00.2 PALPITATIONS: ICD-10-CM

## (undated) DEVICE — SYR LUER LOK 3CC

## (undated) DEVICE — SYR LUER LOK 1CC

## (undated) DEVICE — SUT PROLENE 6-0 4-30" C-1

## (undated) DEVICE — PREP CHLORAPREP HI-LITE ORANGE 26ML

## (undated) DEVICE — VISITEC 4X4

## (undated) DEVICE — SUT POLYSORB 2 30" GS-26

## (undated) DEVICE — SUT PROLENE 7-0 4-24" BV-1

## (undated) DEVICE — SOL IRR POUR NS 0.9% 500ML

## (undated) DEVICE — DRAIN RESERVOIR FOR JACKSON PRATT 100CC CARDINAL

## (undated) DEVICE — NDL HYPO SAFE 18G X 1.5" (PINK)

## (undated) DEVICE — FOLEY TRAY 16FR 5CC LF LUBRISIL ADVANCE TEMP CLOSED

## (undated) DEVICE — SPECIMEN CONTAINER 100ML

## (undated) DEVICE — CONNECTOR STRAIGHT 3/8 X 1/2"

## (undated) DEVICE — SUCTION CATH ARGYLE WHISTLE TIP 14FR STRAIGHT PACKED

## (undated) DEVICE — DRAPE 1/2 SHEET 40X57"

## (undated) DEVICE — SUT POLYSORB 2-0 60" REEL

## (undated) DEVICE — POSITIONER FOAM EGG CRATE ULNAR 2PCS (PINK)

## (undated) DEVICE — AORTIC PUNCH 5MM STANDARD HANDLE

## (undated) DEVICE — ELCTR BOVIE TIP BLADE MEGADYNE E-Z CLEAN 6.5" (LONG)

## (undated) DEVICE — CONNECTOR INTERSEPT "Y" 1/4 X 1/4 X 1/4"

## (undated) DEVICE — SAW BLADE MICROAIRE STERNUM 1X34X9.4MM

## (undated) DEVICE — SYS VEIN HARVESTING VIRTUOSAPH PLUS W/ RADIAL

## (undated) DEVICE — VENODYNE/SCD SLEEVE CALF MEDIUM

## (undated) DEVICE — STEALTH CLAMP INSERT FIBRA/FIBRA 90MM

## (undated) DEVICE — MARKING PEN W RULER

## (undated) DEVICE — DRAPE TOWEL TIBURON ADHESIVE 19" X 30"

## (undated) DEVICE — SUT SURGICAL STEEL 6 30" BP-1

## (undated) DEVICE — DRSG TEGADERM 6"X8"

## (undated) DEVICE — DRAPE IOBAN 23" X 23"

## (undated) DEVICE — SUT PROLENE 3-0 36" SH

## (undated) DEVICE — DRAPE 3/4 SHEET W REINFORCEMENT 56X77"

## (undated) DEVICE — DRSG STOCKINETTE IMPERVIOUS XL

## (undated) DEVICE — PACK UNIVERSAL CARDIAC

## (undated) DEVICE — SUT QUILL MONODERM 2-0 30CM 18MM

## (undated) DEVICE — STRYKER INTERPULSE HANDPIECE W IRR SUCTION TUBE

## (undated) DEVICE — TUBING IV SET MICROCLAVE ADAPTER

## (undated) DEVICE — TOURNIQUET SET 12FR (1 RED, 1 BLUE, 1 SNARE) 7"

## (undated) DEVICE — DRAIN CHANNEL 32FR ROUND HUBLESS FULL FLUTED

## (undated) DEVICE — SOL NORMOSOL-R PH7.4 1000ML

## (undated) DEVICE — NDL HYPO SAFE 22G X 1.5" (BLACK)

## (undated) DEVICE — FEEDING TUBE NG SUMP 16FR 48"

## (undated) DEVICE — WARMING BLANKET DUO-THERM HYPER/HYPOTHERM ADULT

## (undated) DEVICE — DRSG OPSITE 13.75 X 4"

## (undated) DEVICE — FRA-ESU BOVIE FORCE TRIAD T7J19717DX: Type: DURABLE MEDICAL EQUIPMENT

## (undated) DEVICE — SYR LUER LOK 20CC

## (undated) DEVICE — BLADE SCALPEL SAFETYLOCK #10

## (undated) DEVICE — SUT PROLENE 7-0 24" BV175-6

## (undated) DEVICE — STEALTH CLAMP INSERT FIBRA/FIBRA 60MM

## (undated) DEVICE — DRAPE MAYO STAND 30"

## (undated) DEVICE — GLV 8.5 PROTEXIS (WHITE)

## (undated) DEVICE — MEDICATION LABELS W MARKER

## (undated) DEVICE — MULTIPLE PERFUSION SET FEMALE 1 INLET LEG W 4 LEGS 15" (BLUE/RED)

## (undated) DEVICE — DRAPE INSTRUMENT POUCH 6.75" X 11"

## (undated) DEVICE — ELCTR BOVIE TIP BLADE MEGADYNE E-Z CLEAN 2.5" (SHORT)

## (undated) DEVICE — SYR LUER LOK 30CC

## (undated) DEVICE — GLV 7.5 PROTEXIS (WHITE)

## (undated) DEVICE — DRAIN PENROSE .25" X 18" LATEX

## (undated) DEVICE — GLV 8 PROTEXIS (WHITE)

## (undated) DEVICE — PACK CUSTOM W/INSPIRE OXYGENATOR

## (undated) DEVICE — CATH IV SAFE BC 24G X 0.75" (YELLOW)

## (undated) DEVICE — SUT PROLENE 4-0 36" RB-1

## (undated) DEVICE — SYR ASEPTO

## (undated) DEVICE — DRAPE TOWEL BLUE 17" X 24"

## (undated) DEVICE — SOL IRR POUR H2O 250ML

## (undated) DEVICE — POSITIONER CARDIAC BUMP

## (undated) DEVICE — SUT SOFSILK 0 30" V-20

## (undated) DEVICE — SUT POLYSORB 4-0 30" CVF-23

## (undated) DEVICE — SOL IRR BAG NS 0.9% 3000ML

## (undated) DEVICE — WARMING BLANKET FULL ADULT

## (undated) DEVICE — DRAPE SLUSH / WARMER 44 X 66"

## (undated) DEVICE — VENTING ADAPTER "Y" (RED/BLUE) 8"

## (undated) DEVICE — SYNOVIS VASCULAR PROBE 1.5MM 15CM

## (undated) DEVICE — SUT TICRON 5 30" KV-40

## (undated) DEVICE — SUT MONOCRYL 4-0 27" PS-2 UNDYED

## (undated) DEVICE — AORTIC PUNCH 4.0MM STANDARD HANDLE

## (undated) DEVICE — SUT BLUNT SZ 5

## (undated) DEVICE — GLV 8 PROTEXIS ORTHO (CREAM)

## (undated) DEVICE — SUT SOFSILK 2 60" TIES

## (undated) DEVICE — GOWN LG

## (undated) DEVICE — STAPLER SKIN VISI-STAT 35 WIDE

## (undated) DEVICE — CHEST DRAIN PLEUR-EVAC WET/WET ADULT-PEDS SINGLE (QUICK)

## (undated) DEVICE — DRAPE LIGHT HANDLE COVER (BLUE)

## (undated) DEVICE — SUMP PERICARDIAL 20FR 1/4" ADULT

## (undated) DEVICE — BULLDOG SPRING CLIP 6MM SOFT/SOFT

## (undated) DEVICE — GOWN XXXL

## (undated) DEVICE — DRAPE LAPAROTOMY TRANSVERSE

## (undated) DEVICE — SUT PROLENE 8-0 24" BV175-6

## (undated) DEVICE — ELCTR BOVIE TIP BLADE VALLEYLAB 6.5"

## (undated) DEVICE — SYR LUER LOK 50CC

## (undated) DEVICE — DRSG DERMABOND PRINEO 60CM

## (undated) DEVICE — SUT PROLENE 4-0 36" BB

## (undated) DEVICE — VESSEL LOOP MAXI-RED  0.120" X 16"

## (undated) DEVICE — BEAVER BLADE MINI SHARP ALL ROUND (BLUE)

## (undated) DEVICE — SUT MAXON 2-0 27" T-5

## (undated) DEVICE — WARMING BLANKET UPPER ADULT

## (undated) DEVICE — GLV 6.5 PROTEXIS (WHITE)

## (undated) DEVICE — DRSG OPSITE 2.5 X 2"

## (undated) DEVICE — NDL BLUNT 18G LOOP VESSEL MAXI WHITE

## (undated) DEVICE — TUBING TUR 2 PRONG

## (undated) DEVICE — GETINGE VASOVIEW 7 ENDOSCOPIC VESSEL HARVESTING SYSTEM

## (undated) DEVICE — SUT STAINLESS STEEL 5 18" SCC

## (undated) DEVICE — NDL COUNTER FOAM AND MAGNET 40-70

## (undated) DEVICE — SUT BIOSYN 4-0 18" P-12

## (undated) DEVICE — FILTER REINFUSION FOR SALVAGED BLOOD DISP

## (undated) DEVICE — TUBING INSUFFLATION LAP FILTER 10FT

## (undated) DEVICE — BLADE SCALPEL SAFETYLOCK #15

## (undated) DEVICE — DRSG STERISTRIPS 0.5 X 4"

## (undated) DEVICE — SOL ANTI FOG

## (undated) DEVICE — SUT POLYSORB 2-0 30" GS-21 UNDYED

## (undated) DEVICE — GOWN TRIMAX XXL

## (undated) DEVICE — DRSG ACE BANDAGE 6"

## (undated) DEVICE — VENTING ADAPTER "Y" (RED/BLUE) 7.5"

## (undated) DEVICE — BLADE SCALPEL SAFETYLOCK #11

## (undated) DEVICE — TUBING ATS SUCTION LINE

## (undated) DEVICE — PACK MINOR WITH LAP

## (undated) DEVICE — GOWN TRIMAX LG

## (undated) DEVICE — PACK UNIVERSAL CARDIAC SUPPLEMNTAL B

## (undated) DEVICE — TUBING SUCTION 20FT

## (undated) DEVICE — DRAIN CHANNEL 19FR ROUND FULL FLUTED

## (undated) DEVICE — LAP PAD 18 X 18"

## (undated) DEVICE — SUCTION YANKAUER NO CONTROL VENT

## (undated) DEVICE — GLV 7 PROTEXIS (WHITE)

## (undated) DEVICE — CONNECTOR "Y" 1/2 X 3/8 X 3/8"

## (undated) DEVICE — PHRENIC NERVE PAD MEDIUM

## (undated) DEVICE — AORTIC PUNCH 4.0MM LONG LENGTH HANDLE

## (undated) DEVICE — SENSOR MYOCARDIAL TEMP 15MM

## (undated) DEVICE — SUT POLYSORB 3-0 30" V-20 UNDYED

## (undated) DEVICE — TUBING KIT FAST START ATF 40

## (undated) DEVICE — TUBING SUCTION NON CONDUCTIVE 316X18" STERILE

## (undated) DEVICE — DRSG KLING 6"

## (undated) DEVICE — SUT DOUBLE 6 WIRE STERNAL

## (undated) DEVICE — SUT PLEDGET PRE PUNCH 4.8 X 9.5 X 1.5 MM

## (undated) DEVICE — SUT POLYSORB 0 36" GS-25 UNDYED

## (undated) DEVICE — PACING CABLE (BROWN) A/V TEMP SCREW DOWN 12FT